# Patient Record
Sex: FEMALE | Race: WHITE | NOT HISPANIC OR LATINO | Employment: UNEMPLOYED | URBAN - METROPOLITAN AREA
[De-identification: names, ages, dates, MRNs, and addresses within clinical notes are randomized per-mention and may not be internally consistent; named-entity substitution may affect disease eponyms.]

---

## 2017-02-10 ENCOUNTER — TELEPHONE (OUTPATIENT)
Dept: UROGYNECOLOGY | Facility: CLINIC | Age: 37
End: 2017-02-10

## 2017-02-10 NOTE — TELEPHONE ENCOUNTER
Spoke to pt and scheduled her for Dr. Lyons's next available and informed her I'd send an appointment letter in the mail. Pt voiced understanding and call ended.

## 2017-02-10 NOTE — TELEPHONE ENCOUNTER
----- Message from Dmitri Barry sent at 2/10/2017  3:00 PM CST -----  Pt was refer by dr delgado. Pt needs appt with dr elkins. Pt can be reached at [phone].

## 2017-02-10 NOTE — TELEPHONE ENCOUNTER
----- Message from Victorina Wooten sent at 2/10/2017  3:24 PM CST -----  Contact: Patient  X _1st Request  _  2nd Request  _  3rd Request    Who:BRIAN RAYA [[id number]]    Why:Patient was returning a call back from the staff    What Number to Call Back:Patient can be reached at [phone]    When to Expect a call back: (Before the end of the day)   -- if call after 3:00 call back will be tomorrow.

## 2017-03-08 ENCOUNTER — INITIAL CONSULT (OUTPATIENT)
Dept: UROGYNECOLOGY | Facility: CLINIC | Age: 37
End: 2017-03-08
Payer: COMMERCIAL

## 2017-03-08 VITALS
SYSTOLIC BLOOD PRESSURE: 102 MMHG | DIASTOLIC BLOOD PRESSURE: 70 MMHG | HEIGHT: 67 IN | BODY MASS INDEX: 20.17 KG/M2 | WEIGHT: 128.5 LBS

## 2017-03-08 DIAGNOSIS — K58.9 IRRITABLE BOWEL SYNDROME, UNSPECIFIED TYPE: ICD-10-CM

## 2017-03-08 DIAGNOSIS — N30.10 INTERSTITIAL CYSTITIS: Primary | ICD-10-CM

## 2017-03-08 DIAGNOSIS — N80.9 ENDOMETRIOSIS: ICD-10-CM

## 2017-03-08 LAB
BILIRUB UR QL STRIP: NEGATIVE
CLARITY UR REFRACT.AUTO: CLEAR
COLOR UR AUTO: YELLOW
GLUCOSE UR QL STRIP: NEGATIVE
HGB UR QL STRIP: NEGATIVE
KETONES UR QL STRIP: NEGATIVE
LEUKOCYTE ESTERASE UR QL STRIP: NEGATIVE
NITRITE UR QL STRIP: NEGATIVE
PH UR STRIP: 7 [PH] (ref 5–8)
PROT UR QL STRIP: NEGATIVE
SP GR UR STRIP: 1 (ref 1–1.03)
URN SPEC COLLECT METH UR: NORMAL
UROBILINOGEN UR STRIP-ACNC: NEGATIVE EU/DL

## 2017-03-08 PROCEDURE — 81003 URINALYSIS AUTO W/O SCOPE: CPT

## 2017-03-08 PROCEDURE — 87086 URINE CULTURE/COLONY COUNT: CPT

## 2017-03-08 PROCEDURE — 99244 OFF/OP CNSLTJ NEW/EST MOD 40: CPT | Mod: S$GLB,,, | Performed by: OBSTETRICS & GYNECOLOGY

## 2017-03-08 PROCEDURE — 99999 PR PBB SHADOW E&M-EST. PATIENT-LVL III: CPT | Mod: PBBFAC,,, | Performed by: OBSTETRICS & GYNECOLOGY

## 2017-03-08 RX ORDER — NORETHINDRONE ACETATE AND ETHINYL ESTRADIOL, ETHINYL ESTRADIOL AND FERROUS FUMARATE 1MG-10(24)
KIT ORAL
COMMUNITY
Start: 2017-02-04 | End: 2021-03-11 | Stop reason: SDUPTHER

## 2017-03-08 RX ORDER — AMITRIPTYLINE HYDROCHLORIDE 10 MG/1
TABLET, FILM COATED ORAL
Qty: 100 TABLET | Refills: 1 | Status: SHIPPED | OUTPATIENT
Start: 2017-03-08 | End: 2017-04-28 | Stop reason: SDUPTHER

## 2017-03-08 RX ORDER — TOLTERODINE 4 MG/1
4 CAPSULE, EXTENDED RELEASE ORAL DAILY
Qty: 30 CAPSULE | Refills: 11 | Status: SHIPPED | OUTPATIENT
Start: 2017-03-08 | End: 2017-05-08 | Stop reason: SDUPTHER

## 2017-03-08 RX ORDER — URINARY ANTISEPTIC ANTISPASMODIC 81.6; 40.8; 10.8; .12 MG/1; MG/1; MG/1; MG/1
TABLET ORAL
Refills: 0 | COMMUNITY
Start: 2017-01-31 | End: 2017-03-08 | Stop reason: ALTCHOICE

## 2017-03-08 NOTE — LETTER
March 21, 2017      Dionte Gallegos MD  [address] [address]           [facility]  [address] [facility] [address]  Phone: [phone]          Patient: Ghada Manley   MR Number: [id number]   YOB: 1980   Date of Visit: 3/8/2017       Dear Dr. Dionte Gallegos:    Thank you for referring Ghada Manley to me for evaluation. Attached you will find relevant portions of my assessment and plan of care.    If you have questions, please do not hesitate to call me. I look forward to following Ghada Manley along with you.    Sincerely,    Hyun Lyons MD    Enclosure  CC:  No Recipients    If you would like to receive this communication electronically, please contact [email or web address] or [phone] to request more information on [facility] Link access.    For providers and/or their staff who would like to refer a patient to [facility], please contact us through our one-stop-shop provider referral line, [facility] , at [phone].    If you feel you have received this communication in error or would no longer like to receive these types of communications, please e-mail [email or web address]

## 2017-03-08 NOTE — PATIENT INSTRUCTIONS
1. IBS  --continue fiber  --continue avoiding dietary triggers  --Cognitive Behavioral Therapy on [address]     2. IC  --avoid dietary irritants  --manage stress/ anxiety  --detrol LA 4 mg-- stop vesicare.  Can take a few days to see an effect.  --start amitriptyline Take 10 mg nightly x 1 week.  Increase by 10 mg weekly to a max of 50 mg nightly.  --urinalysis and urine culture today  --consider cystohydrodistension vs. Bladder instillations in not improved  --consider prelief because cystex worked for her  --consider elmiron in the future if needed  --consider PT in the future    3. Endometriosis  --continue OCP's  --controlling will help bladder issues    4. RTC 6-8 weeks.

## 2017-03-08 NOTE — MR AVS SNAPSHOT
[facility]  [address] [facility] [address]  Phone: [phone]                  Ghada Manley   3/8/2017 10:30 AM   Initial consult    Description:  Female : 1980   Provider:  Hyun Lyons MD   Department:  [facility]           Reason for Visit     Bladder Pain     Urinary Incontinence     Urinary Frequency     Urinary Urgency           Diagnoses this Visit        Comments    Interstitial cystitis    -  Primary     Irritable bowel syndrome, unspecified type                To Do List           Future Appointments        Provider Department Dept Phone    2017 8:30 AM Hyun Lyons MD [facility] [phone]      Goals (5 Years of Data)     None       These Medications        Disp Refills Start End    tolterodine (DETROL LA) 4 MG 24 hr capsule 30 capsule 11 3/8/2017 3/8/2018    Take 1 capsule (4 mg total) by mouth once daily. - Oral    Pharmacy: [facility] - [address] Ph #: [phone]       amitriptyline (ELAVIL) 10 MG tablet 100 tablet 1 3/8/2017     Take 10 mg nightly x 1 week.  Increase by 10 mg weekly to a max of 50 mg nightly.    Pharmacy: [facility] - [address] Ph #: [phone]         [facility] On Call     [facility] Nurse Care Line -  Assistance  Registered nurses in the [facility] Center provide clinical advisement, health education, appointment booking, and other advisory services.  Call for this free service at [phone].             Medications           Message regarding Medications     Verify the changes and/or additions to your medication regime listed below are the same as discussed with your clinician today.  If any of these changes or additions are incorrect, please notify your healthcare provider.        START taking these NEW medications        Refills    tolterodine (DETROL LA) 4 MG 24 hr  "capsule 11    Sig: Take 1 capsule (4 mg total) by mouth once daily.    Class: Normal    Route: Oral    amitriptyline (ELAVIL) 10 MG tablet 1    Sig: Take 10 mg nightly x 1 week.  Increase by 10 mg weekly to a max of 50 mg nightly.    Class: Normal      STOP taking these medications     fluconazole (DIFLUCAN) 150 MG Tab Take one tablet today and repeat in three days if still symptomatic    methen-sod phos-meth blue-hyos 81.6-40.8-0.12 mg Tab     terconazole (TERAZOL 7) 0.4 % Crea Place 1 applicator vaginally once daily.           Verify that the below list of medications is an accurate representation of the medications you are currently taking.  If none reported, the list may be blank. If incorrect, please contact your healthcare provider. Carry this list with you in case of emergency.           Current Medications     LO LOESTRIN FE 1 mg-10 mcg (24)/10 mcg (2) Tab     metronidazole (METROGEL VAGINAL) 0.75 % vaginal gel Place 1 applicator vaginally once daily.    amitriptyline (ELAVIL) 10 MG tablet Take 10 mg nightly x 1 week.  Increase by 10 mg weekly to a max of 50 mg nightly.    nystatin-triamcinolone (MYCOLOG II) cream Apply to affected area 2 times daily    tolterodine (DETROL LA) 4 MG 24 hr capsule Take 1 capsule (4 mg total) by mouth once daily.           Clinical Reference Information           Your Vitals Were     BP Height Weight BMI       102/70 (BP Location: Right arm, Patient Position: Sitting) 5' 7" (1.702 m) 58.3 kg (128 lb 8.5 oz) 20.13 kg/m2       Blood Pressure          Most Recent Value    BP  102/70      Allergies as of 3/8/2017     Ciprofloxacin (Bulk)    Codeine      Immunizations Administered on Date of Encounter - 3/8/2017     None      Orders Placed During Today's Visit      Normal Orders This Visit    Urinalysis     Urine culture       Instructions    1. IBS  --continue fiber  --continue avoiding dietary triggers  --Cognitive Behavioral Therapy on [address]     2. IC  --avoid dietary " irritants  --manage stress/ anxiety  --detrol LA 4 mg-- stop vesicare.  Can take a few days to see an effect.  --start amitriptyline Take 10 mg nightly x 1 week.  Increase by 10 mg weekly to a max of 50 mg nightly.  --urinalysis and urine culture today  --consider cystohydrodistension vs. Bladder instillations in not improved  --consider prelief because cystex worked for her  --consider elmiron in the future if needed  --consider PT in the future    3. Endometriosis  --continue OCP's  --controlling will help bladder issues    4. RTC 6-8 weeks.         Language Assistance Services     ATTENTION: Language assistance services are available, free of charge. Please call [phone].      ATENCIÓN: Si jorge luisla en, tiene a christianson disposición servicios gratuitos de asistencia lingüística. Llame al [phone].     SUPRIYA Ý: N?u b?n nói Ti?ng Vi?t, có các d?ch v? h? tr? ngôn ng? mi?n phí dành cho b?n. G?i s? [phone].         [facility] complies with applicable Federal civil rights laws and does not discriminate on the basis of race, color, national origin, age, disability, or sex.

## 2017-03-08 NOTE — PROGRESS NOTES
[facility]  [address] [facility] [address]    Ghada Manley  [id number]  1980 March 21, 2017    Consulting Physician: Dionte Gallegos MD   GYN: MD [facility]  Primary M.D.: Dionte Gallegos MD    Chief Complaint   Patient presents with    Bladder Pain     pt states she feel a intense feeling when she has to go and burning sensation    Urinary Incontinence     pt states she leak urine when she laugh, cough, or sneeze    Urinary Frequency     pt states she always has to go to the bathroom    Urinary Urgency     pt states she always has the urge to go       HPI:   1)  BM: +IBS-D.  No constipation.  +food triggers (fructose intolerance).  Stress can flare.  Flares make bladder sx worse.  Pretty well controlled on meds. No hematochezia.  Takes fiber daily.  2)  IC:  --when/how Dx: 2007 Dx IBS.  Then had some irritable bladder with 1st child.  Had sx in 2012--saw [facility], started on ? Med, was given sample x 3 days--which helped.  Off market, but AZO can help x a few days by not as much.  2014:  Intermittent yeast infection/UTI.  Saw Rosy and was referred here.  Started probiotic on own, and this has stopped all issues.  Only has some clear, vaginal d/c, which is not bothersome.  2015 was seen by [facility] in [place], was Dx with IC--advised to avoid dietary irritants.  Was also told to control endometriosis--was placed on lo lo estrin to control heavy, painful menses (bladder issues worse with this).  Takes OCP continuously, which controls menstrual symptoms.  Had flare around Xmas 2015 (stress/anxiety)--took urogesic blue, but made her feel worse.  Then, stopped.  Saw Dr. Levy ([facility])--wanted to do cysto/hydro, instillations, CT due to micro hematuria.  Was given VESIcare (helped urgency, osiris if standing).  Stopped x 1 day, sx really worse, so restarted.  VESIcare makes chest itch?    --typical symptoms with flares: U/F, no dysuria/hesitancy.  Slight relief with emptying  but short lived.  Constant low grade vaginal/bladder pain.  Dull, constant.  Will have intermittent cramping.  Current frequency Qh.  Without flare: Q 2-3 hours.  Nocturia 0-1x/PM.  Some UUI and slight MIGNON with bending forward.  Does not typically have UI.    --flare freq: last flare Xmas .  Still having.  Usually Q6 months.    --current regimen: Taking 5 mg daily.  Is it working? Improved but not idea--mostly with U/F.    --past attempted meds/Tx: urogesic (caused pain).  AZO dulls mildly.  Cystex helps pain most.  Took amitriptyline for years for IBS--stopped b/c wanted to get off meds.    --anxiety/stress: +.  Bladder/bowel worse with this.  Did therapy in past (talk and visualization)--last was few years ago. Controlled: variable, mostly around holidays.  Good support system.   --seasonal allergies: none  --general pelvic pain/dyspareunia: occasional has cramping that feels like mild menstrual cramps, ?Ghost period symptoms.  Has been avoiding sex: act not painful but irritated afterwards.    --no s/sx POP    Past Medical History  Past Medical History:   Diagnosis Date    Menarche     Age of onset 11   IBS    Past Surgical History  Past Surgical History:   Procedure Laterality Date     SECTION      SALPINGECTOMY     Dx LSC: endometriosis, adhesiolysis, ?fulguration  c/s x 2  2009: Ectopic pregnancy + unilateral saplingectomy    Hysterectomy: No    Past Ob History     x 0.  C/s x 2 (1st breech).    Largest infant weight: 7#10oz.      Gynecologic History  LMP: No LMP recorded. Patient is not currently having periods (Reason: Birth Control).  Age of menarche: 11 y.o.  Age of menopause: NA  Menstrual history: h/o endo, dysmenorrhea, menorrhagia.  On continuous OCP.   Last pap: 2017, normal per report.  No h/o abnl paps/STDs.   Last mammogram: Baseline 34 y.o (+FH).  Repeat at 39 y.o.   Colonoscopy: , normal per report (hemorrhoids). For IBS.   DEXA: NA    Family History  Family History  "  Problem Relation Age of Onset    Diabetes Maternal Grandmother     Breast cancer Maternal Grandmother     Diabetes Maternal Grandfather     Hypertension Maternal Grandfather     Diabetes Father     Hypertension Father     Breast cancer Mother 61    Breast cancer Maternal Aunt 28    Colon cancer Neg Hx     Ovarian cancer Neg Hx     Vaginal cancer Neg Hx     Endometrial cancer Neg Hx     Cervical cancer Neg Hx       Colon CA: No  Breast CA: Yes - mother (60s), MA (25 y.o.), MGM (BRCA neg but other gene +).    GYN CA: No   CA: No    Social History  History   Smoking Status    Never Smoker   Smokeless Tobacco    Never Used     History   Alcohol Use No   .    History   Drug Use No   .  The patient is .  Resides in [place] [address].  Employment status: homemaker.     Allergies  Review of patient's allergies indicates:   Allergen Reactions    Ciprofloxacin (bulk) Anaphylaxis    Codeine Anaphylaxis       Medications  Current Outpatient Prescriptions on File Prior to Visit   Medication Sig Dispense Refill    metronidazole (METROGEL VAGINAL) 0.75 % vaginal gel Place 1 applicator vaginally once daily. 1 applicator 0    nystatin-triamcinolone (MYCOLOG II) cream Apply to affected area 2 times daily 30 g 1     No current facility-administered medications on file prior to visit.        Review of Systems A 14 point ROS was reviewed with pertinent positives as noted above in the history of present illness.      Constitutional: negative  Eyes: negative  Endocrine: negative  Gastrointestinal: negative  Cardiovascular: negative  Respiratory: negative  Allergic/Immunologic: negative  Integumentary: negative  Psychiatric: negative  Musculoskeletal: negative   Ear/Nose/Throat: negative  Neurologic: negative  Genitourinary: SEE HPI  Hematologic/Lymphatic: negative   Breast: negative    Urogynecologic Exam  /70 (BP Location: Right arm, Patient Position: Sitting)  Ht 5' 7" (1.702 m)  Wt 58.3 kg (128 " lb 8.5 oz)  BMI 20.13 kg/m2    GENERAL APPEARANCE:  The patient is a well-developed, well-nourished  female.  Neck:  Supple with no thyromegaly, no carotid bruits.  Heart:  Regular rate and rhythm, no murmurs, rubs or gallops.  Lungs:  Clear.  No CVA tenderness.  Abdomen:  Soft, nontender, nondistended, no hepatosplenomegaly.  Incisions:  Pfannenstiel, Well healed    PELVIC:    External genitalia:  Normal Bartholins, Skenes and labia bilaterally.  + suprapubic pressure with palpation  Urethra:  No caruncle, diverticulum or masses.  (--) hypermobility.    Vagina:  Atrophy (--) , no bladder masses or tender, no discharge.    Cervix:  normal appearance  Uterus: normal size, contour, position, consistency, mobility, non-tender  Adnexa: Not palpable.    POP-Q:  Deferred.  No obvious POP present with valsalva.     NEUROLOGIC:  Cranial nerves 2 through 12 intact.  Strength 5/5.  DTRs 2+ lower extremities.  S2 through 4 normal.  Sacral reflexes intact.    EXT: GUZMAN, 2+ pulses bilaterally, no C/C/E    COUGH STRESS TEST:  negative  KEGEL: 1 /5    RECTAL:    External:  Normal, (--) hemorrhoids, (--) dovetailing.   Internal:  Deferred    Impression    1. Interstitial cystitis    2. Irritable bowel syndrome, unspecified type    3. Endometriosis        Initial Plan  The patient was counseled regarding these issues. She was given a summary sheet containing each of these issues with possible options for evaluation and management. We also reviewed computer-generated diagrams specific to her pelvic organ prolapse quantification findings and she was given a copy of this for her records.  All her questions were addressed to her satisfaction.     1. IBS  --continue fiber  --continue avoiding dietary triggers  --Cognitive Behavioral Therapy on [address]     2. IC  --avoid dietary irritants  --manage stress/ anxiety  --detrol LA 4 mg-- stop vesicare.  Can take a few days to see an effect.  --start amitriptyline Take 10 mg nightly x 1  week.  Increase by 10 mg weekly to a max of 50 mg nightly.  --urinalysis and urine culture today  --consider cystohydrodistension vs. Bladder instillations in not improved  --consider prelief because cystex worked for her  --consider elmiron in the future if needed  --consider PT in the future    3. Endometriosis  --continue OCP's  --controlling will help bladder issues    4. RTC 6-8 weeks.    Approximately 45 min were spent in consult, 90 % in discussion.     Thank you for requesting consultation of your patient.  I look forward to participating in her care.    Hyun Lyons  Female Pelvic Medicine and Reconstructive Surgery  [facility] [place], [place]

## 2017-03-10 LAB — BACTERIA UR CULT: NO GROWTH

## 2017-03-21 PROBLEM — N30.10 INTERSTITIAL CYSTITIS: Status: ACTIVE | Noted: 2017-03-21

## 2017-03-21 PROBLEM — N80.9 ENDOMETRIOSIS: Status: ACTIVE | Noted: 2017-03-21

## 2017-03-21 PROBLEM — K58.9 IRRITABLE BOWEL SYNDROME: Status: ACTIVE | Noted: 2017-03-21

## 2017-04-24 ENCOUNTER — TELEPHONE (OUTPATIENT)
Dept: UROGYNECOLOGY | Facility: CLINIC | Age: 37
End: 2017-04-24

## 2017-04-28 ENCOUNTER — OFFICE VISIT (OUTPATIENT)
Dept: UROGYNECOLOGY | Facility: CLINIC | Age: 37
End: 2017-04-28
Payer: COMMERCIAL

## 2017-04-28 VITALS
DIASTOLIC BLOOD PRESSURE: 74 MMHG | SYSTOLIC BLOOD PRESSURE: 110 MMHG | HEIGHT: 67 IN | BODY MASS INDEX: 20.41 KG/M2 | WEIGHT: 130.06 LBS

## 2017-04-28 DIAGNOSIS — N30.10 INTERSTITIAL CYSTITIS: ICD-10-CM

## 2017-04-28 DIAGNOSIS — K58.9 IRRITABLE BOWEL SYNDROME, UNSPECIFIED TYPE: Primary | ICD-10-CM

## 2017-04-28 DIAGNOSIS — N80.9 ENDOMETRIOSIS: ICD-10-CM

## 2017-04-28 PROCEDURE — 99213 OFFICE O/P EST LOW 20 MIN: CPT | Mod: S$GLB,,, | Performed by: OBSTETRICS & GYNECOLOGY

## 2017-04-28 PROCEDURE — 1160F RVW MEDS BY RX/DR IN RCRD: CPT | Mod: S$GLB,,, | Performed by: OBSTETRICS & GYNECOLOGY

## 2017-04-28 PROCEDURE — 99999 PR PBB SHADOW E&M-EST. PATIENT-LVL III: CPT | Mod: PBBFAC,,, | Performed by: OBSTETRICS & GYNECOLOGY

## 2017-04-28 RX ORDER — AMITRIPTYLINE HYDROCHLORIDE 10 MG/1
TABLET, FILM COATED ORAL
Qty: 60 TABLET | Refills: 3 | Status: SHIPPED | OUTPATIENT
Start: 2017-04-28 | End: 2017-05-08 | Stop reason: SDUPTHER

## 2017-04-28 NOTE — MR AVS SNAPSHOT
[facility]  [address] [facility] [address]  Phone: [phone]                  Ghada Manley   2017 9:00 AM   Office Visit    Description:  Female : 1980   Provider:  Hyun Lyons MD   Department:  [facility]           Reason for Visit     IC     Irritable Bowel Syndrome           Diagnoses this Visit        Comments    Interstitial cystitis                To Do List           Goals (5 Years of Data)     None       These Medications        Disp Refills Start End    amitriptyline (ELAVIL) 10 MG tablet 60 tablet 3 2017     Take 10 mg nightly x 1 week.  Increase by 10 mg weekly to a max of 50 mg nightly.    Pharmacy: [address] #: [phone]         [facility] On Call     [facility] On [facility] Nurse Care Line -  Assistance  Unless otherwise directed by your provider, please contact [facility], our nurse care line that is available for  assistance.     Registered nurses in the [facility] provide: appointment scheduling, clinical advisement, health education, and other advisory services.  Call: [phone] (toll free)               Medications           Message regarding Medications     Verify the changes and/or additions to your medication regime listed below are the same as discussed with your clinician today.  If any of these changes or additions are incorrect, please notify your healthcare provider.        STOP taking these medications     metronidazole (METROGEL VAGINAL) 0.75 % vaginal gel Place 1 applicator vaginally once daily.           Verify that the below list of medications is an accurate representation of the medications you are currently taking.  If none reported, the list may be blank. If incorrect, please contact your healthcare provider. Carry this list with you in case of emergency.           Current Medications     amitriptyline (ELAVIL) 10  "MG tablet Take 10 mg nightly x 1 week.  Increase by 10 mg weekly to a max of 50 mg nightly.    LO LOESTRIN FE 1 mg-10 mcg (24)/10 mcg (2) Tab     tolterodine (DETROL LA) 4 MG 24 hr capsule Take 1 capsule (4 mg total) by mouth once daily.    nystatin-triamcinolone (MYCOLOG II) cream Apply to affected area 2 times daily           Clinical Reference Information           Your Vitals Were     BP Height Weight BMI       110/74 (BP Location: Left arm, Patient Position: Sitting) 5' 7" (1.702 m) 59 kg (130 lb 1.1 oz) 20.37 kg/m2       Blood Pressure          Most Recent Value    BP  110/74      Allergies as of 4/28/2017     Ciprofloxacin (Bulk)    Codeine      Immunizations Administered on Date of Encounter - 4/28/2017     None      Instructions    1. IBS  --continue fiber  --continue avoiding dietary triggers  --continue meditation/relaxation exercises    2. IC  --avoid dietary irritants  --manage stress/ anxiety  --continue detrol LA 4 mg  --continue amitriptyline 10-20 mg daily; may increase shortterm for flares; call us if would like to send to mail order  --consider cystohydrodistension vs. Bladder instillations in not improved  --consider prelief because cystex worked for her  --consider elmiron in the future if needed  --consider PT in the future    3. Endometriosis  --continue OCP's  --controlling will help bladder issues    4. RTC 6 months.          Language Assistance Services     ATTENTION: Language assistance services are available, free of charge. Please call [phone].      ATENCIÓN: Si guy en, tiene a christianson disposición servicios gratuitos de asistencia lingüística. Llame al [phone].     [facility] Ý: N?u b?n nói Ti?ng Vi?t, có các d?ch v? h? tr? ngôn ng? mi?n phí dành cho b?n. G?i s? [phone].         [facility] complies with applicable Federal civil rights laws and does not discriminate on the basis of race, color, national origin, age, disability, or sex.        "

## 2017-04-28 NOTE — PATIENT INSTRUCTIONS
1. IBS  --continue fiber  --continue avoiding dietary triggers  --continue meditation/relaxation exercises    2. IC  --avoid dietary irritants  --manage stress/ anxiety  --continue detrol LA 4 mg  --continue amitriptyline 10-20 mg daily; may increase shortterm for flares; call us if would like to send to mail order  --consider cystohydrodistension vs. Bladder instillations in not improved  --consider prelief because cystex worked for her  --consider elmiron in the future if needed  --consider PT in the future    3. Endometriosis  --continue OCP's  --controlling will help bladder issues    4. RTC 6 months.

## 2017-04-28 NOTE — PROGRESS NOTES
Urogyn follow up    [facility]  [address] [address]    Ghada Manley  [id number]  1980      Ghada Manley is a 36 y.o. here for a urogyn follow up.    1)  BM: +IBS-D.  No constipation.  +food triggers (fructose intolerance).  Stress can flare.  Flares make bladder sx worse.  Pretty well controlled on meds. No hematochezia.  Takes fiber daily.  2)  IC:  --when/how Dx: 2007 Dx IBS.  Then had some irritable bladder with 1st child.  Had sx in 2012--saw URO JOSE, started on ? Med, was given sample x 3 days--which helped.  Off market, but AZO can help x a few days by not as much.  2014:  Intermittent yeast infection/UTI.  Saw [facility] and was referred here.  Started probiotic on own, and this has stopped all issues.  Only has some clear, vaginal d/c, which is not bothersome.  2015 was seen by [facility] in [place], was Dx with IC--advised to avoid dietary irritants.  Was also told to control endometriosis--was placed on lo lo estrin to control heavy, painful menses (bladder issues worse with this).  Takes OCP continuously, which controls menstrual symptoms.  Had flare around Xmas 2015 (stress/anxiety)--took urogesic blue, but made her feel worse.  Then, stopped.  Saw Dr. Levy (URO)--wanted to do cysto/hydro, instillations, CT due to micro hematuria.  Was given VESIcare (helped urgency, osiris if standing).  Stopped x 1 day, sx really worse, so restarted.  VESIcare makes chest itch?    --typical symptoms with flares: U/F, no dysuria/hesitancy.  Slight relief with emptying but short lived.  Constant low grade vaginal/bladder pain.  Dull, constant.  Will have intermittent cramping.  Current frequency Qh.  Without flare: Q 2-3 hours.  Nocturia 0-1x/PM.  Some UUI and slight MIGNON with bending forward.  Does not typically have UI.    --flare freq: last flare Xmas 2016.  Still having.  Usually Q6 months.    --current regimen: Taking 5 mg daily.  Is it working? Improved but not idea--mostly  with U/F.    --past attempted meds/Tx: urogesic (caused pain).  AZO dulls mildly.  Cystex helps pain most.  Took amitriptyline for years for IBS--stopped b/c wanted to get off meds.    --anxiety/stress: +.  Bladder/bowel worse with this.  Did therapy in past (talk and visualization)--last was few years ago. Controlled: variable, mostly around holidays.  Good support system.   --seasonal allergies: none  --general pelvic pain/dyspareunia: occasional has cramping that feels like mild menstrual cramps, ?Ghost period symptoms.  Has been avoiding sex: act not painful but irritated afterwards.    --no s/sx POP    Initial exam:  PELVIC:    External genitalia:  Normal Bartholins, Skenes and labia bilaterally.  + suprapubic pressure with palpation  Urethra:  No caruncle, diverticulum or masses.  (--) hypermobility.    Vagina:  Atrophy (--) , no bladder masses or tender, no discharge.    Cervix:  normal appearance  Uterus: normal size, contour, position, consistency, mobility, non-tender  Adnexa: Not palpable.  POP-Q:  Deferred.  No obvious POP present with valsalva.     Past Medical History  Past Medical History:   Diagnosis Date    Menarche     Age of onset 11   IBS    Past Surgical History  Past Surgical History:   Procedure Laterality Date     SECTION      SALPINGECTOMY     Dx LSC: endometriosis, adhesiolysis, ?fulguration  c/s x 2  2009: Ectopic pregnancy + unilateral saplingectomy    Hysterectomy: No    Past Ob History     x 0.  C/s x 2 (1st breech).    Largest infant weight: 7#10oz.      Well-woman:  Last pap: 2017, normal per report.  No h/o abnl paps/STDs.   Last mammogram: Baseline 34 y.o (+FH).  Repeat at 39 y.o.   Colonoscopy: , normal per report (hemorrhoids). For IBS.   DEXA: NA    Issues include:  Patient Active Problem List   Diagnosis    Family history of breast cancer    Interstitial cystitis    Irritable bowel syndrome    Endometriosis     History since last visit:     1.  "IBS  --continue fiber  --continue avoiding dietary triggers  --didn't start CBT; has started guided meditation tapes     2. IC  --avoid dietary irritants  --after Alice had short flare; increased amitriptyline 30 mg x 3 days, and was ok  --Started Detrol LA daily (no issues).    --started amitriptyline: taking 20 mg nightly--helping but makes a little sleepy  --has been restarting exercise  --urine NEG   --consider cystohydrodistension vs. Bladder instillations in not improved  --consider prelief because cystex worked for her  --consider elmiron in the future if needed  --consider PT in the future    3. Endometriosis  --continue OCP's  --controlling will help bladder issues    Medications:    Current Outpatient Prescriptions:     amitriptyline (ELAVIL) 10 MG tablet, Take 10 mg nightly x 1 week.  Increase by 10 mg weekly to a max of 50 mg nightly., Disp: 60 tablet, Rfl: 3    LO LOESTRIN FE 1 mg-10 mcg (24)/10 mcg (2) Tab, , Disp: , Rfl:     tolterodine (DETROL LA) 4 MG 24 hr capsule, Take 1 capsule (4 mg total) by mouth once daily., Disp: 30 capsule, Rfl: 11    nystatin-triamcinolone (MYCOLOG II) cream, Apply to affected area 2 times daily, Disp: 30 g, Rfl: 1      ROS:  As per HPI.      Exam  /74 (BP Location: Left arm, Patient Position: Sitting)  Ht 5' 7" (1.702 m)  Wt 59 kg (130 lb 1.1 oz)  BMI 20.37 kg/m2  General: alert and oriented, no acute distress  Remainder of PE deferred.     Impression  1. Irritable bowel syndrome, unspecified type     2. Interstitial cystitis  amitriptyline (ELAVIL) 10 MG tablet   3. Endometriosis       We reviewed the above issues and discussed options for short-term versus long-term management of her problems.   Plan:   1. IBS  --continue fiber  --continue avoiding dietary triggers  --continue meditation/relaxation exercises    2. IC  --avoid dietary irritants  --manage stress/ anxiety  --continue detrol LA 4 mg  --continue amitriptyline 10-20 mg daily; may increase " shortterm for flares; call us if would like to send to mail order  --consider cystohydrodistension vs. Bladder instillations in not improved  --consider prelief because cystex worked for her  --consider elmiron in the future if needed  --consider PT in the future    3. Endometriosis  --continue OCP's  --controlling will help bladder issues    4. RTC 6 months.     30 minutes were spent in face to face time with this patient  100 % of this time was spent in counseling and/or coordination of care  ME@  [facility]  Division of Female Pelvic Medicine and Reconstructive Surgery  Department of Obstetrics & Gynecology

## 2017-05-08 DIAGNOSIS — N30.10 INTERSTITIAL CYSTITIS: ICD-10-CM

## 2017-05-08 RX ORDER — AMITRIPTYLINE HYDROCHLORIDE 10 MG/1
TABLET, FILM COATED ORAL
Qty: 60 TABLET | Refills: 3 | Status: SHIPPED | OUTPATIENT
Start: 2017-05-08 | End: 2017-09-18 | Stop reason: SDUPTHER

## 2017-05-08 RX ORDER — TOLTERODINE 4 MG/1
4 CAPSULE, EXTENDED RELEASE ORAL DAILY
Qty: 30 CAPSULE | Refills: 11 | Status: SHIPPED | OUTPATIENT
Start: 2017-05-08 | End: 2018-05-05 | Stop reason: SDUPTHER

## 2017-05-08 NOTE — TELEPHONE ENCOUNTER
----- Message from Surekha Limon sent at 5/8/2017  9:50 AM CDT -----  Contact: BRIAN RAYA [[id number]]  x_  1st Request  _  2nd Request  _  3rd Request        Who: BRIAN RAYA [[id number]]    Why: Patient states she would like a call back in regards to medications. She states [facility] has faxed over request for mail in requests on Rx DETROL LA and ELAVIL. She states if there is no request than the office can call [phone]. Thanks     What Number to Call Back: [phone] patient     When to Expect a call back: (Before the end of the day)   -- if call after 3:00 call back will be tomorrow.

## 2017-05-08 NOTE — TELEPHONE ENCOUNTER
Spoke with pt and advised her we did receive the request today for her RX's and It'll be sent in escribed to [facility]. She verbalized understanding call ended.    Pt Pharmacy sent a Fax requesting a refill of Detrol & Elavil . Pt last seen on 04/28/2017 .

## 2017-09-18 DIAGNOSIS — N30.10 INTERSTITIAL CYSTITIS: ICD-10-CM

## 2017-09-18 RX ORDER — AMITRIPTYLINE HYDROCHLORIDE 10 MG/1
TABLET, FILM COATED ORAL
Qty: 60 TABLET | Refills: 3 | Status: SHIPPED | OUTPATIENT
Start: 2017-09-18 | End: 2018-11-14

## 2017-09-18 RX ORDER — AMITRIPTYLINE HYDROCHLORIDE 10 MG/1
20 TABLET, FILM COATED ORAL NIGHTLY
Qty: 60 TABLET | Refills: 10 | Status: SHIPPED | OUTPATIENT
Start: 2017-09-18 | End: 2018-10-22 | Stop reason: SDUPTHER

## 2017-09-18 NOTE — TELEPHONE ENCOUNTER
----- Message from Dorota Cook sent at 9/18/2017 10:50 AM CDT -----  Contact: self  Patient is requesting a follow up appointment, patient can be reached at [phone]

## 2017-09-18 NOTE — TELEPHONE ENCOUNTER
Called pt to schedule her 6 month fup. Pt also request a refill on her amitriptyline thru her mail order [facility] being she will be out before her scheduled appointment.

## 2017-10-04 ENCOUNTER — OFFICE VISIT (OUTPATIENT)
Dept: UROGYNECOLOGY | Facility: CLINIC | Age: 37
End: 2017-10-04
Payer: COMMERCIAL

## 2017-10-04 VITALS
HEIGHT: 67 IN | SYSTOLIC BLOOD PRESSURE: 102 MMHG | DIASTOLIC BLOOD PRESSURE: 64 MMHG | WEIGHT: 132.5 LBS | BODY MASS INDEX: 20.8 KG/M2

## 2017-10-04 DIAGNOSIS — K58.9 IRRITABLE BOWEL SYNDROME, UNSPECIFIED TYPE: ICD-10-CM

## 2017-10-04 DIAGNOSIS — N30.10 INTERSTITIAL CYSTITIS: Primary | ICD-10-CM

## 2017-10-04 DIAGNOSIS — Z80.3 FAMILY HISTORY OF BREAST CANCER: ICD-10-CM

## 2017-10-04 DIAGNOSIS — N80.9 ENDOMETRIOSIS: ICD-10-CM

## 2017-10-04 PROCEDURE — 99213 OFFICE O/P EST LOW 20 MIN: CPT | Mod: S$GLB,,, | Performed by: OBSTETRICS & GYNECOLOGY

## 2017-10-04 PROCEDURE — 99999 PR PBB SHADOW E&M-EST. PATIENT-LVL III: CPT | Mod: PBBFAC,,, | Performed by: OBSTETRICS & GYNECOLOGY

## 2017-10-04 NOTE — PROGRESS NOTES
Urogyn follow up    [facility]  [address] [place] [address] [address]    Ghada Manley  [id number]  1980    GYN: [facility]    Ghada Manley is a 37 y.o. here for a urogyn follow up.    1)  BM: +IBS-D.  No constipation.  +food triggers (fructose intolerance).  Stress can flare.  Flares make bladder sx worse.  Pretty well controlled on meds. No hematochezia.  Takes fiber daily.  2)  IC:  --when/how Dx: 2007 Dx IBS.  Then had some irritable bladder with 1st child.  Had sx in 2012--saw [facility]RO EJ, started on ? Med, was given sample x 3 days--which helped.  Off market, but AZO can help x a few days by not as much.  2014:  Intermittent yeast infection/UTI.  Saw [facility] and was referred here.  Started probiotic on own, and this has stopped all issues.  Only has some clear, vaginal d/c, which is not bothersome.  2015 was seen by [facility] in [place], was Dx with IC--advised to avoid dietary irritants.  Was also told to control endometriosis--was placed on lo lo estrin to control heavy, painful menses (bladder issues worse with this).  Takes OCP continuously, which controls menstrual symptoms.  Had flare around Xmas 2015 (stress/anxiety)--took urogesic blue, but made her feel worse.  Then, stopped.  Saw Dr. Levy (URO)--wanted to do cysto/hydro, instillations, CT due to micro hematuria.  Was given VESIcare (helped urgency, osiris if standing).  Stopped x 1 day, sx really worse, so restarted.  VESIcare makes chest itch?    --typical symptoms with flares: U/F, no dysuria/hesitancy.  Slight relief with emptying but short lived.  Constant low grade vaginal/bladder pain.  Dull, constant.  Will have intermittent cramping.  Current frequency Qh.  Without flare: Q 2-3 hours.  Nocturia 0-1x/PM.  Some UUI and slight MIGNON with bending forward.  Does not typically have UI.    --flare freq: last flare Xmas 2016.  Still having.  Usually Q6 months.    --current regimen: Taking 5 mg daily.  Is it working? Improved but not  idea--mostly with U/F.    --past attempted meds/Tx: urogesic (caused pain).  AZO dulls mildly.  Cystex helps pain most.  Took amitriptyline for years for IBS--stopped b/c wanted to get off meds.    --anxiety/stress: +.  Bladder/bowel worse with this.  Did therapy in past (talk and visualization)--last was few years ago. Controlled: variable, mostly around holidays.  Good support system.   --seasonal allergies: none  --general pelvic pain/dyspareunia: occasional has cramping that feels like mild menstrual cramps, ?Ghost period symptoms.  Has been avoiding sex: act not painful but irritated afterwards.    --no s/sx POP    Initial exam:  PELVIC:    External genitalia:  Normal Bartholins, Skenes and labia bilaterally.  + suprapubic pressure with palpation  Urethra:  No caruncle, diverticulum or masses.  (--) hypermobility.    Vagina:  Atrophy (--) , no bladder masses or tender, no discharge.    Cervix:  normal appearance  Uterus: normal size, contour, position, consistency, mobility, non-tender  Adnexa: Not palpable.  POP-Q:  Deferred.  No obvious POP present with valsalva.     Past Medical History  Past Medical History:   Diagnosis Date    Menarche     Age of onset 11   IBS    Past Surgical History  Past Surgical History:   Procedure Laterality Date     SECTION      SALPINGECTOMY     Dx LSC: endometriosis, adhesiolysis, ?fulguration  c/s x 2  2009: Ectopic pregnancy + unilateral saplingectomy    Hysterectomy: No    Past Ob History     x 0.  C/s x 2 (1st breech).    Largest infant weight: 7#10oz.      Well-woman:  Last pap: 2017, normal per report.  No h/o abnl paps/STDs.   Last mammogram: Baseline 34 y.o (+FH).  Repeat at 39 y.o.   Colonoscopy: , normal per report (hemorrhoids). For IBS.   DEXA: NA    Issues include:  Patient Active Problem List   Diagnosis    Family history of breast cancer    Interstitial cystitis    Irritable bowel syndrome    Endometriosis     History since last visit:  "    1. IBS  --continue fiber  --continue avoiding dietary triggers  --didn't start CBT; has started guided meditation tapes     2. IC  --avoiding dietary irritants  --after Alice had short flare; increased amitriptyline 30 mg x 3 days, and was ok  --Started Detrol LA daily (no issues).    --started amitriptyline: taking 20 mg nightly--helping but makes a little sleepy; 10 not effective; goes up to 30 mg PRN for mild flares  --exercising  --urine NEG   --consider cystohydrodistension vs. Bladder instillations in not improved  --consider prelief because cystex worked for her  --consider elmiron in the future if needed  --consider PT in the future    3. Endometriosis  --continue OCP's  --controlling will help bladder issues    4.  MIGNON:  --still has small amounts; wears pantyliner daily--very slight    Medications:    Current Outpatient Prescriptions:     amitriptyline (ELAVIL) 10 MG tablet, Take 10 mg nightly x 1 week.  Increase by 10 mg weekly to a max of 50 mg nightly., Disp: 60 tablet, Rfl: 3    amitriptyline (ELAVIL) 10 MG tablet, Take 2 tablets (20 mg total) by mouth every evening., Disp: 60 tablet, Rfl: 10    LO LOESTRIN FE 1 mg-10 mcg (24)/10 mcg (2) Tab, , Disp: , Rfl:     tolterodine (DETROL LA) 4 MG 24 hr capsule, Take 1 capsule (4 mg total) by mouth once daily., Disp: 30 capsule, Rfl: 11      ROS:  As per HPI.      Exam  /64   Ht 5' 7" (1.702 m)   Wt 60.1 kg (132 lb 7.9 oz)   BMI 20.75 kg/m²   General: alert and oriented, no acute distress  Remainder of PE deferred.     Impression  1. Interstitial cystitis     2. Endometriosis     3. Irritable bowel syndrome, unspecified type     4. Family history of breast cancer       We reviewed the above issues and discussed options for short-term versus long-term management of her problems.   Plan:   1. IBS  --continue fiber  --continue avoiding dietary triggers  --continue meditation/relaxation exercises    2. IC  --avoid dietary irritants  --manage " stress/ anxiety  --continue detrol LA 4 mg  --continue amitriptyline 10-20 mg daily; may increase shortterm for flares; call us if would like to send to mail order  --consider cystohydrodistension vs. Bladder instillations in not improved  --consider prelief because cystex worked for her  --consider elmiron in the future if needed  --consider PT in the future    3. Endometriosis  --continue OCP's  --controlling will help bladder issues    4. Stress incontinence:  --Empty bladder every 3 hours.  Empty well: wait a minute, lean forward on toilet.    --KEGELS: do 10 in AM and 10 in PM, holding each x 10 seconds.  When you feel urge to go, STOP, KEGEL, and when urge has passed, then go to bathroom.  Consider PT in future.    --do not go overboard with these--watch making pelvic tension worse  --let us know if not helping--can consider PT    5.  RTC 1 year.      20 minutes were spent in face to face time with this patient  100 % of this time was spent in counseling and/or coordination of care  ME@  [facility]  Division of Female Pelvic Medicine and Reconstructive Surgery  Department of Obstetrics & Gynecology

## 2017-10-04 NOTE — PATIENT INSTRUCTIONS
1. IBS  --continue fiber  --continue avoiding dietary triggers  --continue meditation/relaxation exercises    2. IC  --avoid dietary irritants  --manage stress/ anxiety  --continue detrol LA 4 mg  --continue amitriptyline 10-20 mg daily; may increase shortterm for flares; call us if would like to send to mail order  --consider cystohydrodistension vs. Bladder instillations in not improved  --consider prelief because cystex worked for her  --consider elmiron in the future if needed  --consider PT in the future    3. Endometriosis  --continue OCP's  --controlling will help bladder issues    4. Stress incontinence:  --Empty bladder every 3 hours.  Empty well: wait a minute, lean forward on toilet.    --KEGELS: do 10 in AM and 10 in PM, holding each x 10 seconds.  When you feel urge to go, STOP, KEGEL, and when urge has passed, then go to bathroom.  Consider PT in future.    --do not go overboard with these--watch making pelvic tension worse  --let us know if not helping--can consider PT    5.  RTC 1 year.

## 2018-01-29 ENCOUNTER — OFFICE VISIT (OUTPATIENT)
Dept: URGENT CARE | Facility: CLINIC | Age: 38
End: 2018-01-29
Payer: COMMERCIAL

## 2018-01-29 VITALS
SYSTOLIC BLOOD PRESSURE: 121 MMHG | RESPIRATION RATE: 16 BRPM | OXYGEN SATURATION: 100 % | BODY MASS INDEX: 20.72 KG/M2 | HEIGHT: 67 IN | WEIGHT: 132 LBS | TEMPERATURE: 99 F | HEART RATE: 92 BPM | DIASTOLIC BLOOD PRESSURE: 73 MMHG

## 2018-01-29 DIAGNOSIS — Z20.828 EXPOSURE TO INFLUENZA: Primary | ICD-10-CM

## 2018-01-29 LAB
CTP QC/QA: YES
FLUAV AG NPH QL: NEGATIVE
FLUBV AG NPH QL: NEGATIVE

## 2018-01-29 PROCEDURE — 87804 INFLUENZA ASSAY W/OPTIC: CPT | Mod: 59,QW,S$GLB, | Performed by: NURSE PRACTITIONER

## 2018-01-29 PROCEDURE — 99214 OFFICE O/P EST MOD 30 MIN: CPT | Mod: S$GLB,,, | Performed by: NURSE PRACTITIONER

## 2018-01-29 RX ORDER — OSELTAMIVIR PHOSPHATE 75 MG/1
75 CAPSULE ORAL 2 TIMES DAILY
Qty: 10 CAPSULE | Refills: 0 | Status: SHIPPED | OUTPATIENT
Start: 2018-01-29 | End: 2018-02-03

## 2018-01-29 NOTE — PATIENT INSTRUCTIONS
Influenza (Adult)    Influenza is also called the flu. It is a viral illness that affects the air passages of your lungs. It is different from the common cold. The flu can easily be passed from one to person to another. It may be spread through the air by coughing and sneezing. Or it can be spread by touching the sick person and then touching your own eyes, nose, or mouth.  The flu starts 1 to 3 days after you are exposed to the flu virus. It may last for 1 to 2 weeks but many people feel tired or fatigued for many weeks afterward. You usually dont need to take antibiotics unless you have a complication. This might be an ear or sinus infection or pneumonia.  Symptoms of the flu may be mild or severe. They can include extreme tiredness (wanting to stay in bed all day), chills, fevers, muscle aches, soreness with eye movement, headache, and a dry, hacking cough.  Home care  Follow these guidelines when caring for yourself at home:  · Avoid being around cigarette smoke, whether yours or other peoples.  · Acetaminophen or ibuprofen will help ease your fever, muscle aches, and headache. Dont give aspirin to anyone younger than 18 who has the flu. Aspirin can harm the liver.  · Nausea and loss of appetite are common with the flu. Eat light meals. Drink 6 to 8 glasses of liquids every day. Good choices are water, sport drinks, soft drinks without caffeine, juices, tea, and soup. Extra fluids will also help loosen secretions in your nose and lungs.  · Over-the-counter cold medicines will not make the flu go away faster. But the medicines may help with coughing, sore throat, and congestion in your nose and sinuses. Dont use a decongestant if you have high blood pressure.  · Stay home until your fever has been gone for at least 24 hours without using medicine to reduce fever.  Follow-up care  Follow up with your healthcare provider, or as advised, if you are not getting better over the next week.  If you are age 65 or  older, talk with your provider about getting a pneumococcal vaccine every 5 years. You should also get this vaccine if you have chronic asthma or COPD. All adults should get a flu vaccine every fall. Ask your provider about this.  When to seek medical advice  Call your healthcare provider right away if any of these occur:  · Cough with lots of colored mucus (sputum) or blood in your mucus  · Chest pain, shortness of breath, wheezing, or trouble breathing  · Severe headache, or face, neck, or ear pain  · New rash with fever  · Fever of 100.4°F (38°C) or higher, or as directed by your healthcare provider  · Confusion, behavior change, or seizure  · Severe weakness or dizziness  · You get a new fever or cough after getting better for a few days  Date Last Reviewed: 1/1/2017 © 2000-2017 [employer]. [address] [place] [address]. All rights reserved. This information is not intended as a substitute for professional medical care. Always follow your healthcare professional's instructions.    Symptomatic treatment:    Tylenol every 4 hours  Ibuprofen ever 6 hours  salt water gargles  Cold-eeze helps to reduce the duration of sore throat symptoms  Cepachol helps to numb the discomfort  Chloroseptic spray  Nasal saline spray reduces inflammation and dryness  Warm face compresses as often as you can  Vicks vapor rub at night  Flonase OTC or Nasacort OTC  Simple foods like chicken noodle soup help  Delsym helps with coughing at night  Zyrtec/Claritin during the day and Benadryl at night may help if allergy component   Rest as much as you can                                                          If we discussed that I think your illness is viral it will not respond to antibiotics and it will last 10-14 days.  Flonase (fluticasone) is a nasal spray which is available over the counter and may help with your symptoms   Zyrtec D, Claritin D or allegra D can also help with symptoms of congestion and  "drainage.   If you have hypertension avoid using the "D" which is the decongestant   If you just have drainage you can take plain zyrtec, claritin or allegra   If you just have a congested feeling you can take pseudoephedrine (unless you have high blood pressure) which you have to sign for behind the counter. Do not buy the phenylephrine which is on the shelf as it is not effective   Tylenol or ibuprofen can also be used as directed for pain unless you have an allergy to them or medical condition such as stomach ulcers, kidney or liver disease or blood thinners etc for which you should not be taking these type of medications.   If you are flying in the next few days Afrin nose drops for the airplane flight upon take off and landing may help. Other than at those times refrain from using afrin.       Please arrange follow up with your primary medical clinic as soon as possible. You must understand that you've received an Urgent Care treatment only and that you may be released before all of your medical problems are known or treated. You, the patient, will arrange for follow up as instructed. If your symptoms worsen or fail to improve you should go to the Emergency Room.      "

## 2018-01-29 NOTE — PROGRESS NOTES
Subjective:       Patient ID: Ghada Manley is a 37 y.o. female.    Vitals:    01/29/18 1426   BP: 121/73   Pulse: 92   Resp: 16   Temp: 99.1 °F (37.3 °C)       Chief Complaint: Cough    Patient started yesterday with dry cough,fever and chills. + exposure to influenza at home, pt requesting to be tested for flu and be given Tamiflu regardless of results of test. Pt tearful upon exam.       Cough   Associated symptoms include chills, a fever (at home yesterday 99.6), headaches and a sore throat. Pertinent negatives include no chest pain, ear pain, eye redness, myalgias, shortness of breath or wheezing.     Review of Systems   Constitution: Positive for chills, fever (at home yesterday 99.6) and malaise/fatigue.        Body ache   HENT: Positive for sore throat. Negative for congestion, ear pain and hoarse voice.    Eyes: Negative for discharge and redness.   Cardiovascular: Negative for chest pain, dyspnea on exertion and leg swelling.   Respiratory: Positive for cough (dry). Negative for shortness of breath, sputum production and wheezing.    Musculoskeletal: Negative for myalgias.   Gastrointestinal: Negative for abdominal pain and nausea.   Neurological: Positive for headaches.   All other systems reviewed and are negative.      Objective:      Physical Exam   Constitutional: She is oriented to person, place, and time. She appears well-developed and well-nourished. She is cooperative.  Non-toxic appearance. She does not appear ill. No distress.   HENT:   Head: Normocephalic and atraumatic.   Right Ear: Hearing, tympanic membrane, external ear and ear canal normal.   Left Ear: Hearing, tympanic membrane, external ear and ear canal normal.   Nose: Rhinorrhea present. No mucosal edema or nasal deformity. No epistaxis. Right sinus exhibits no maxillary sinus tenderness and no frontal sinus tenderness. Left sinus exhibits no maxillary sinus tenderness and no frontal sinus tenderness.   Mouth/Throat: Uvula is  midline and mucous membranes are normal. No trismus in the jaw. Normal dentition. No uvula swelling. Posterior oropharyngeal erythema present.   Eyes: Conjunctivae and lids are normal. No scleral icterus.   Sclera clear bilat   Neck: Trachea normal, full passive range of motion without pain and phonation normal. Neck supple.   Cardiovascular: Normal rate, regular rhythm, normal heart sounds, intact distal pulses and normal pulses.    Pulmonary/Chest: Effort normal and breath sounds normal. No respiratory distress.   Abdominal: Soft. Normal appearance and bowel sounds are normal. She exhibits no distension. There is no tenderness.   Musculoskeletal: Normal range of motion. She exhibits no edema or deformity.   Lymphadenopathy:        Head (right side): Submandibular adenopathy present. No submental, no tonsillar, no preauricular and no posterior auricular adenopathy present.        Head (left side): Submandibular adenopathy present. No submental, no tonsillar, no preauricular and no posterior auricular adenopathy present.   Neurological: She is alert and oriented to person, place, and time. She exhibits normal muscle tone. Coordination normal.   Skin: Skin is warm, dry and intact. She is not diaphoretic. No pallor.   Psychiatric: She has a normal mood and affect. Her speech is normal and behavior is normal. Judgment and thought content normal. Cognition and memory are normal.   Nursing note and vitals reviewed.      Results for orders placed or performed in visit on 01/29/18   POCT Influenza A/B   Result Value Ref Range    Rapid Influenza A Ag Negative Negative    Rapid Influenza B Ag Negative Negative     Acceptable Yes        Assessment:       1. Exposure to influenza        Plan:     Tamiflu give to pt per requests and + exposure to influenza in the home.   Ghada was seen today for cough.    Diagnoses and all orders for this visit:    Exposure to influenza  -     POCT Influenza A/B    Other  orders  -     oseltamivir (TAMIFLU) 75 MG capsule; Take 1 capsule (75 mg total) by mouth 2 (two) times daily.

## 2018-05-05 DIAGNOSIS — N30.10 INTERSTITIAL CYSTITIS: ICD-10-CM

## 2018-05-06 RX ORDER — TOLTERODINE 4 MG/1
CAPSULE, EXTENDED RELEASE ORAL
Qty: 30 CAPSULE | Refills: 6 | Status: SHIPPED | OUTPATIENT
Start: 2018-05-06 | End: 2018-11-14 | Stop reason: SDUPTHER

## 2018-10-22 DIAGNOSIS — N30.10 INTERSTITIAL CYSTITIS: ICD-10-CM

## 2018-10-23 RX ORDER — AMITRIPTYLINE HYDROCHLORIDE 10 MG/1
TABLET, FILM COATED ORAL
Qty: 60 TABLET | Refills: 0 | Status: SHIPPED | OUTPATIENT
Start: 2018-10-23 | End: 2021-03-11

## 2018-11-07 ENCOUNTER — TELEPHONE (OUTPATIENT)
Dept: UROGYNECOLOGY | Facility: CLINIC | Age: 38
End: 2018-11-07

## 2018-11-07 NOTE — TELEPHONE ENCOUNTER
----- Message from Ghada Dewey sent at 11/7/2018  9:33 AM CST -----  Contact: Patient   Patient called to schedule an appointment. The patient can be reached at [phone].

## 2018-11-14 ENCOUNTER — OFFICE VISIT (OUTPATIENT)
Dept: UROGYNECOLOGY | Facility: CLINIC | Age: 38
End: 2018-11-14
Payer: COMMERCIAL

## 2018-11-14 VITALS — WEIGHT: 133.81 LBS | HEIGHT: 67 IN | BODY MASS INDEX: 21 KG/M2

## 2018-11-14 DIAGNOSIS — K58.9 IRRITABLE BOWEL SYNDROME, UNSPECIFIED TYPE: Primary | ICD-10-CM

## 2018-11-14 DIAGNOSIS — N30.10 INTERSTITIAL CYSTITIS: ICD-10-CM

## 2018-11-14 DIAGNOSIS — N80.9 ENDOMETRIOSIS: ICD-10-CM

## 2018-11-14 DIAGNOSIS — Z80.3 FAMILY HISTORY OF BREAST CANCER: ICD-10-CM

## 2018-11-14 PROCEDURE — 3008F BODY MASS INDEX DOCD: CPT | Mod: CPTII,S$GLB,, | Performed by: OBSTETRICS & GYNECOLOGY

## 2018-11-14 PROCEDURE — 99999 PR PBB SHADOW E&M-EST. PATIENT-LVL III: CPT | Mod: PBBFAC,,, | Performed by: OBSTETRICS & GYNECOLOGY

## 2018-11-14 PROCEDURE — 99213 OFFICE O/P EST LOW 20 MIN: CPT | Mod: S$GLB,,, | Performed by: OBSTETRICS & GYNECOLOGY

## 2018-11-14 RX ORDER — AMITRIPTYLINE HYDROCHLORIDE 10 MG/1
10 TABLET, FILM COATED ORAL NIGHTLY
Qty: 90 TABLET | Refills: 6 | Status: SHIPPED | OUTPATIENT
Start: 2018-11-14 | End: 2019-11-20 | Stop reason: SDUPTHER

## 2018-11-14 RX ORDER — AMOXICILLIN AND CLAVULANATE POTASSIUM 875; 125 MG/1; MG/1
TABLET, FILM COATED ORAL
Refills: 0 | COMMUNITY
Start: 2018-11-02 | End: 2018-11-14

## 2018-11-14 RX ORDER — TOLTERODINE 4 MG/1
4 CAPSULE, EXTENDED RELEASE ORAL DAILY
Qty: 90 CAPSULE | Refills: 6 | Status: SHIPPED | OUTPATIENT
Start: 2018-11-14 | End: 2019-11-20 | Stop reason: SDUPTHER

## 2018-11-14 NOTE — PROGRESS NOTES
Urogyn follow up    [facility] [address] [place] [address]    Ghada Manley  [id number]  1980    GYN: Jessica    Ghada Manley is a 38 y.o. here for a urogyn follow up.    1)  BM: +IBS-D.  No constipation.  +food triggers (fructose intolerance).  Stress can flare.  Flares make bladder sx worse.  Pretty well controlled on meds. No hematochezia.  Takes fiber daily.  2)  IC:  --when/how Dx: 2007 Dx IBS.  Then had some irritable bladder with 1st child.  Had sx in 2012--saw [facility] EJ, started on ? Med, was given sample x 3 days--which helped.  Off market, but AZO can help x a few days by not as much.  2014:  Intermittent yeast infection/UTI.  Saw Rosy and was referred here.  Started probiotic on own, and this has stopped all issues.  Only has some clear, vaginal d/c, which is not bothersome.  2015 was seen by [facility] in [place], was Dx with IC--advised to avoid dietary irritants.  Was also told to control endometriosis--was placed on lo lo estrin to control heavy, painful menses (bladder issues worse with this).  Takes OCP continuously, which controls menstrual symptoms.  Had flare around Xmas 2015 (stress/anxiety)--took urogesic blue, but made her feel worse.  Then, stopped.  Saw Dr. Levy (URO)--wanted to do cysto/hydro, instillations, CT due to micro hematuria.  Was given VESIcare (helped urgency, osiris if standing).  Stopped x 1 day, sx really worse, so restarted.  VESIcare makes chest itch?    --typical symptoms with flares: U/F, no dysuria/hesitancy.  Slight relief with emptying but short lived.  Constant low grade vaginal/bladder pain.  Dull, constant.  Will have intermittent cramping.  Current frequency Qh.  Without flare: Q 2-3 hours.  Nocturia 0-1x/PM.  Some UUI and slight MIGNON with bending forward.  Does not typically have UI.    --flare freq: last flare Xmas 2016.  Still having.  Usually Q6 months.    --current regimen: Taking 5 mg daily.  Is  it working? Improved but not idea--mostly with U/F.    --past attempted meds/Tx: urogesic (caused pain).  AZO dulls mildly.  Cystex helps pain most.  Took amitriptyline for years for IBS--stopped b/c wanted to get off meds.    --anxiety/stress: +.  Bladder/bowel worse with this.  Did therapy in past (talk and visualization)--last was few years ago. Controlled: variable, mostly around holidays.  Good support system.   --seasonal allergies: none  --general pelvic pain/dyspareunia: occasional has cramping that feels like mild menstrual cramps, ?Ghost period symptoms.  Has been avoiding sex: act not painful but irritated afterwards.    --no s/sx POP    Initial exam:  PELVIC:    External genitalia:  Normal Bartholins, Skenes and labia bilaterally.  + suprapubic pressure with palpation  Urethra:  No caruncle, diverticulum or masses.  (--) hypermobility.    Vagina:  Atrophy (--) , no bladder masses or tender, no discharge.    Cervix:  normal appearance  Uterus: normal size, contour, position, consistency, mobility, non-tender  Adnexa: Not palpable.  POP-Q:  Deferred.  No obvious POP present with valsalva.     Past Medical History  Past Medical History:   Diagnosis Date    Menarche     Age of onset 11   IBS    Past Surgical History  Past Surgical History:   Procedure Laterality Date     SECTION      SALPINGECTOMY     Dx LSC: endometriosis, adhesiolysis, ?fulguration  c/s x 2  2009: Ectopic pregnancy + unilateral saplingectomy    Hysterectomy: No    Past Ob History     x 0.  C/s x 2 (1st breech).    Largest infant weight: 7#10oz.      Well-woman:  Last pap: , normal per report.  No h/o abnl paps/STDs.   Last mammogram: Baseline 34 y.o (+FH).  Repeat at 39 y.o.   Colonoscopy: , normal per report (hemorrhoids). For IBS.   DEXA: NA    Issues include:  Patient Active Problem List   Diagnosis    Family history of breast cancer    Interstitial cystitis    Irritable bowel syndrome    Endometriosis  "    History since last visit:     1. IBS  --continue fiber  --continue avoiding dietary triggers  --didn't start CBT; has started guided meditation tapes     2. IC  --avoiding dietary irritants  --had flare a few weeks ago  --continues Detrol LA 4 mg daily (no issues).    --started amitriptyline: taking 10 mg nightly--helping but makes a little sleepy; 10 not effective; goes up to 30 mg PRN for mild flares  --exercising  --urine NEG   --consider cystohydrodistension vs. Bladder instillations in not improved  --consider prelief because cystex worked for her  --consider elmiron in the future if needed  --consider PT in the future    3. Endometriosis  --continue OCP's  --controlling will help bladder issues    4.  MIGNON:  --improved with core strengthening  --still has small amounts; wears pantyliner daily--very slight    Medications:    Current Outpatient Medications:     amitriptyline (ELAVIL) 10 MG tablet, TAKE 2 TABLETS EVERY EVENING, Disp: 60 tablet, Rfl: 0    LO LOESTRIN FE 1 mg-10 mcg (24)/10 mcg (2) Tab, , Disp: , Rfl:     tolterodine (DETROL LA) 4 MG 24 hr capsule, Take 1 capsule (4 mg total) by mouth once daily., Disp: 90 capsule, Rfl: 6    amitriptyline (ELAVIL) 10 MG tablet, Take 1 tablet (10 mg total) by mouth every evening., Disp: 90 tablet, Rfl: 6      ROS:  As per HPI.      Exam  Ht 5' 7" (1.702 m)   Wt 60.7 kg (133 lb 13.1 oz)   LMP  (LMP Unknown)   BMI 20.96 kg/m²   General: alert and oriented, no acute distress  Remainder of PE deferred.     Impression  1. Irritable bowel syndrome, unspecified type     2. Interstitial cystitis  amitriptyline (ELAVIL) 10 MG tablet    tolterodine (DETROL LA) 4 MG 24 hr capsule   3. Endometriosis     4. Family history of breast cancer       We reviewed the above issues and discussed options for short-term versus long-term management of her problems.   Plan:   1. IBS  --continue fiber  --continue avoiding dietary triggers  --continue meditation/relaxation " exercises    2. IC  --avoid dietary irritants  --manage stress/ anxiety  --continue detrol LA 4 mg  --continue amitriptyline 10-20 mg daily; may increase shortterm for flares; call us if would like to send to mail order  --consider cystohydrodistension vs. Bladder instillations in not improved  --consider prelief because cystex worked for her  --consider elmiron in the future if needed  --consider PT in the future    3. Endometriosis  --continue OCP's  --controlling will help bladder issues    4. Stress incontinence:  --Empty bladder every 3 hours.  Empty well: wait a minute, lean forward on toilet.    --KEGELS: do 10 in AM and 10 in PM, holding each x 10 seconds.  When you feel urge to go, STOP, KEGEL, and when urge has passed, then go to bathroom.  Consider PT in future.    --do not go overboard with these--watch making pelvic tension worse  --let us know if not helping--can consider PT    5.  RTC 1 year.      20 minutes were spent in face to face time with this patient  100 % of this time was spent in counseling and/or coordination of care  ME@  [facility]  Division of Female Pelvic Medicine and Reconstructive Surgery  Department of Obstetrics & Gynecology

## 2019-11-17 NOTE — PROGRESS NOTES
Urogyn follow up    [facility] [place] [address]   [address] [address]    Ghada Manley  [id number]  1980    GYN: [facility]    Ghada Manley is a 39 y.o. here for a urogyn follow up.    1)  BM: +IBS-D.  No constipation.  +food triggers (fructose intolerance).  Stress can flare.  Flares make bladder sx worse.  Pretty well controlled on meds. No hematochezia.  Takes fiber daily.  2)  IC:  --when/how Dx: 2007 Dx IBS.  Then had some irritable bladder with 1st child.  Had sx in 2012--saw [facility]SE, started on ? Med, was given sample x 3 days--which helped.  Off market, but AZO can help x a few days by not as much.  2014:  Intermittent yeast infection/UTI.  Saw Rosy and was referred here.  Started probiotic on own, and this has stopped all issues.  Only has some clear, vaginal d/c, which is not bothersome.  2015 was seen by [facility] in [place], was Dx with IC--advised to avoid dietary irritants.  Was also told to control endometriosis--was placed on lo lo estrin to control heavy, painful menses (bladder issues worse with this).  Takes OCP continuously, which controls menstrual symptoms.  Had flare around Xmas 2015 (stress/anxiety)--took urogesic blue, but made her feel worse.  Then, stopped.  Saw Dr. Levy (URO)--wanted to do cysto/hydro, instillations, CT due to micro hematuria.  Was given VESIcare (helped urgency, osiris if standing).  Stopped x 1 day, sx really worse, so restarted.  VESIcare makes chest itch?    --typical symptoms with flares: U/F, no dysuria/hesitancy.  Slight relief with emptying but short lived.  Constant low grade vaginal/bladder pain.  Dull, constant.  Will have intermittent cramping.  Current frequency Qh.  Without flare: Q 2-3 hours.  Nocturia 0-1x/PM.  Some UUI and slight MIGNON with bending forward.  Does not typically have UI.    --flare freq: last flare Xmas 2016.  Still having.  Usually Q6 months.    --current regimen: Taking 5 mg daily.  Is  it working? Improved but not idea--mostly with U/F.    --past attempted meds/Tx: urogesic (caused pain).  AZO dulls mildly.  Cystex helps pain most.  Took amitriptyline for years for IBS--stopped b/c wanted to get off meds.    --anxiety/stress: +.  Bladder/bowel worse with this.  Did therapy in past (talk and visualization)--last was few years ago. Controlled: variable, mostly around holidays.  Good support system.   --seasonal allergies: none  --general pelvic pain/dyspareunia: occasional has cramping that feels like mild menstrual cramps, ?Ghost period symptoms.  Has been avoiding sex: act not painful but irritated afterwards.    --no s/sx POP    Initial exam:  PELVIC:    External genitalia:  Normal Bartholins, Skenes and labia bilaterally.  + suprapubic pressure with palpation  Urethra:  No caruncle, diverticulum or masses.  (--) hypermobility.    Vagina:  Atrophy (--) , no bladder masses or tender, no discharge.    Cervix:  normal appearance  Uterus: normal size, contour, position, consistency, mobility, non-tender  Adnexa: Not palpable.  POP-Q:  Deferred.  No obvious POP present with valsalva.     Past Medical History  Past Medical History:   Diagnosis Date    Menarche     Age of onset 11   IBS    Past Surgical History  Past Surgical History:   Procedure Laterality Date     SECTION      SALPINGECTOMY     Dx LSC: endometriosis, adhesiolysis, ?fulguration  c/s x 2  2009: Ectopic pregnancy + unilateral saplingectomy    Hysterectomy: No    Past Ob History     x 0.  C/s x 2 (1st breech).    Largest infant weight: 7#10oz.      Well-woman:  Last pap: , normal per report.  No h/o abnl paps/STDs.   Last mammogram: Baseline 34 y.o (+FH).  Repeat at 39 y.o.   Colonoscopy: , normal per report (hemorrhoids). For IBS.   DEXA: NA    Issues include:  Patient Active Problem List   Diagnosis    Family history of breast cancer    Interstitial cystitis    Irritable bowel syndrome    Endometriosis  "    History since last visit:     1. IBS  --has had some intermittent cramping from SP to back area--resolves quickly  --continues fiber + probiotic  --continue avoiding dietary triggers  --continues guided meditation when things flare  --exercise helps    2. IC  --avoiding dietary irritants  --had flare a few weeks ago  --continues Detrol LA 4 mg daily (no issues).    --started amitriptyline: taking 10 mg nightly--helping but makes a little sleepy; 10 not effective; goes up to 30 mg PRN for mild flares  --exercising  --urine NEG   --consider cystohydrodistension vs. Bladder instillations in not improved  --consider prelief because cystex worked for her  --consider elmiron in the future if needed  --consider PT in the future    3. Endometriosis  --continue OCP's  --controlling will help bladder issues    4.  MIGNON:  --not really bothersome  --improved with core strengthening  --last visit: still has small amounts; wears pantyliner daily--very slight  --today: no more liners with core strengthening    Medications:    Current Outpatient Medications:     amitriptyline (ELAVIL) 10 MG tablet, Take 1 tablet (10 mg total) by mouth every evening., Disp: 90 tablet, Rfl: 6    LO LOESTRIN FE 1 mg-10 mcg (24)/10 mcg (2) Tab, , Disp: , Rfl:     tolterodine (DETROL LA) 4 MG 24 hr capsule, Take 1 capsule (4 mg total) by mouth once daily., Disp: 90 capsule, Rfl: 6    amitriptyline (ELAVIL) 10 MG tablet, TAKE 2 TABLETS EVERY EVENING (Patient not taking: Reported on 11/20/2019), Disp: 60 tablet, Rfl: 0      ROS:  As per HPI.      Exam  /74 (BP Location: Right arm, Patient Position: Sitting, BP Method: Medium (Manual))   Ht 5' 1" (1.549 m)   Wt 61 kg (134 lb 7.7 oz)   BMI 25.41 kg/m²   General: alert and oriented, no acute distress  Remainder of PE deferred.     Impression  1. Irritable bowel syndrome with both constipation and diarrhea     2. Interstitial cystitis  tolterodine (DETROL LA) 4 MG 24 hr capsule    " amitriptyline (ELAVIL) 10 MG tablet   3. Family history of breast cancer     4. Endometriosis       We reviewed the above issues and discussed options for short-term versus long-term management of her problems.   Plan:   1. IBS  --continue fiber + probiotic  --continue avoiding dietary triggers  --continue meditation/relaxation exercises    2. IC  --avoid dietary irritants  --manage stress/ anxiety  --continue detrol LA 4 mg  --continue amitriptyline 10-20 mg daily; may increase shortterm for flares; call us if would like to send to mail order  --consider cystohydrodistension vs. Bladder instillations in not improved  --consider prelief because cystex worked for her  --consider elmiron in the future if needed  --consider PT in the future    3. Endometriosis  --continue OCP's  --controlling will help bladder issues    4. Stress incontinence:  --Empty bladder every 3 hours.  Empty well: wait a minute, lean forward on toilet.    --KEGELS: do 10 in AM and 10 in PM, holding each x 10 seconds.  When you feel urge to go, STOP, KEGEL, and when urge has passed, then go to bathroom.  Consider PT in future.    --do not go overboard with these--watch making pelvic tension worse  --let us know if not helping--can consider PT    5.  Med refills provided.  See GYN yearly for well-woman.  RTC 1 year.      20 minutes were spent in face to face time with this patient  100 % of this time was spent in counseling and/or coordination of care  ME@  [facility]  Division of Female Pelvic Medicine and Reconstructive Surgery  Department of Obstetrics & Gynecology

## 2019-11-20 ENCOUNTER — OFFICE VISIT (OUTPATIENT)
Dept: UROGYNECOLOGY | Facility: CLINIC | Age: 39
End: 2019-11-20
Payer: COMMERCIAL

## 2019-11-20 VITALS
DIASTOLIC BLOOD PRESSURE: 74 MMHG | SYSTOLIC BLOOD PRESSURE: 110 MMHG | HEIGHT: 61 IN | BODY MASS INDEX: 25.39 KG/M2 | WEIGHT: 134.5 LBS

## 2019-11-20 DIAGNOSIS — N80.9 ENDOMETRIOSIS: ICD-10-CM

## 2019-11-20 DIAGNOSIS — K58.2 IRRITABLE BOWEL SYNDROME WITH BOTH CONSTIPATION AND DIARRHEA: Primary | ICD-10-CM

## 2019-11-20 DIAGNOSIS — Z80.3 FAMILY HISTORY OF BREAST CANCER: ICD-10-CM

## 2019-11-20 DIAGNOSIS — N30.10 INTERSTITIAL CYSTITIS: ICD-10-CM

## 2019-11-20 PROCEDURE — 99213 OFFICE O/P EST LOW 20 MIN: CPT | Mod: S$GLB,,, | Performed by: OBSTETRICS & GYNECOLOGY

## 2019-11-20 PROCEDURE — 99999 PR PBB SHADOW E&M-EST. PATIENT-LVL III: CPT | Mod: PBBFAC,,, | Performed by: OBSTETRICS & GYNECOLOGY

## 2019-11-20 PROCEDURE — 99999 PR PBB SHADOW E&M-EST. PATIENT-LVL III: ICD-10-PCS | Mod: PBBFAC,,, | Performed by: OBSTETRICS & GYNECOLOGY

## 2019-11-20 PROCEDURE — 99213 PR OFFICE/OUTPT VISIT, EST, LEVL III, 20-29 MIN: ICD-10-PCS | Mod: S$GLB,,, | Performed by: OBSTETRICS & GYNECOLOGY

## 2019-11-20 PROCEDURE — 3008F PR BODY MASS INDEX (BMI) DOCUMENTED: ICD-10-PCS | Mod: CPTII,S$GLB,, | Performed by: OBSTETRICS & GYNECOLOGY

## 2019-11-20 PROCEDURE — 3008F BODY MASS INDEX DOCD: CPT | Mod: CPTII,S$GLB,, | Performed by: OBSTETRICS & GYNECOLOGY

## 2019-11-20 RX ORDER — AMITRIPTYLINE HYDROCHLORIDE 10 MG/1
10 TABLET, FILM COATED ORAL NIGHTLY
Qty: 90 TABLET | Refills: 6 | Status: SHIPPED | OUTPATIENT
Start: 2019-11-20 | End: 2020-11-18 | Stop reason: SDUPTHER

## 2019-11-20 RX ORDER — TOLTERODINE 4 MG/1
4 CAPSULE, EXTENDED RELEASE ORAL DAILY
Qty: 90 CAPSULE | Refills: 6 | Status: SHIPPED | OUTPATIENT
Start: 2019-11-20 | End: 2020-11-18 | Stop reason: SDUPTHER

## 2019-11-20 NOTE — PATIENT INSTRUCTIONS
1. IBS  --continue fiber + probiotic  --continue avoiding dietary triggers  --continue meditation/relaxation exercises    2. IC  --avoid dietary irritants  --manage stress/ anxiety  --continue detrol LA 4 mg  --continue amitriptyline 10-20 mg daily; may increase shortterm for flares; call us if would like to send to mail order  --consider cystohydrodistension vs. Bladder instillations in not improved  --consider prelief because cystex worked for her  --consider elmiron in the future if needed  --consider PT in the future    3. Endometriosis  --continue OCP's  --controlling will help bladder issues    4. Stress incontinence:  --Empty bladder every 3 hours.  Empty well: wait a minute, lean forward on toilet.    --KEGELS: do 10 in AM and 10 in PM, holding each x 10 seconds.  When you feel urge to go, STOP, KEGEL, and when urge has passed, then go to bathroom.  Consider PT in future.    --do not go overboard with these--watch making pelvic tension worse  --let us know if not helping--can consider PT    5.  RTC 1 year.

## 2019-12-20 ENCOUNTER — PATIENT OUTREACH (OUTPATIENT)
Dept: ADMINISTRATIVE | Facility: HOSPITAL | Age: 39
End: 2019-12-20

## 2019-12-20 NOTE — PROGRESS NOTES
Pre-visit chart review completed.  Immunizations reviewed and updated.    Patient due for the following    Lipid Panel     Pap Smear with HPV Cotest      Patient new to provider.

## 2020-01-06 NOTE — PROGRESS NOTES
[facility] Note    Chief Complaint      Chief Complaint   Patient presents with    [facility]    Annual Exam       History of Present Illness      Ghada Manley is a 39 y.o. WF with IBS, IC, Endometriosis, Stress Urinary Incontinence, Fam h/o Breast CA presents to est PCP and for well visit.     IBS - She avoids Fructose due to Fructose intolerance.  She manages this with diet and stress control.     IC - Fu by Dr. Spencer. Pt on Amitriptyline.  Fu by Dr. Spencer.    Stress Incontinence - Pt on Detrol LA.  Fu by Dr. Spencer.     Family h/o Breast CA - Mom, Mat GM, and Mat Aunt - they were BRCA neg.  Pt lifetime risk 34 % per recent MGM. Refer to Breast Surgeon for further discussion about inc testing including poss MRI breast and genetic testing. Note - TP on Lo-Loestrin OCP for Dysmenorrhea.     HCM - Flu - 10/22/19; Tdap - 17; MGM - 3/22/19 At [facility];  PAP - 19 - neg per pt;  Uro/GYN - Dr. Spencer; Ob/GYN - Dr. Lopez; Ophtho - Dr. Morgan; Derm - Dr. Cervantes    Past Medical History:  Past Medical History:   Diagnosis Date    Dysmenorrhea     Ectopic pregnancy     Endometriosis     IBS (irritable bowel syndrome)     Interstitial cystitis     Menarche     Age of onset 11    Ocular migraine     Urine, incontinence, stress female        Past Surgical History:   has a past surgical history that includes Salpingectomy;  section; and Laparoscopy ().    Family History:  family history includes Breast cancer (age of onset: 28) in her maternal aunt; Breast cancer (age of onset: 61) in her mother; Breast cancer (age of onset: 70) in her maternal grandmother; Diabetes in her father, maternal grandfather, and maternal grandmother; Hypertension in her father and maternal grandfather.     Social History:  Social History     Tobacco Use    Smoking status: Never Smoker    Smokeless tobacco: Never Used   Substance Use Topics    Alcohol use: Yes     Frequency: 4 or  more times a week     Drinks per session: 1 or 2    Drug use: Never       Review of Systems   Constitutional: Negative for chills, diaphoresis and fever.   HENT: Negative for congestion and hearing loss.    Eyes: Negative for blurred vision, double vision and discharge.        Wears glasses   Respiratory: Negative for cough and wheezing.    Cardiovascular: Positive for palpitations. Negative for chest pain.        1 episode after drinking 1.5 glasses wine - resolved after < 5 minutes.  .  No assoc SOB, or dizziness. Felt a little tight   Gastrointestinal: Positive for heartburn. Negative for abdominal pain, blood in stool, constipation, diarrhea, melena and vomiting.   Genitourinary: Negative for dysuria and hematuria.        Rare leakage - does well on meds   Musculoskeletal: Negative for joint pain, myalgias and neck pain.   Skin: Negative for itching and rash.   Neurological: Negative for weakness and headaches.   Endo/Heme/Allergies: Negative for polydipsia.   Psychiatric/Behavioral: Negative for depression. The patient is nervous/anxious.         Stable.         Medications:  Outpatient Encounter Medications as of 1/7/2020   Medication Sig Note Dispense Refill    amitriptyline (ELAVIL) 10 MG tablet Take 1 tablet (10 mg total) by mouth every evening.  90 tablet 6    LO LOESTRIN FE 1 mg-10 mcg (24)/10 mcg (2) Tab  3/8/2017: Received from: External Pharmacy      tolterodine (DETROL LA) 4 MG 24 hr capsule Take 1 capsule (4 mg total) by mouth once daily.  90 capsule 6    amitriptyline (ELAVIL) 10 MG tablet TAKE 2 TABLETS EVERY EVENING (Patient not taking: Reported on 11/20/2019)  60 tablet 0     No facility-administered encounter medications on file as of 1/7/2020.        Current Outpatient Medications:     amitriptyline (ELAVIL) 10 MG tablet, Take 1 tablet (10 mg total) by mouth every evening., Disp: 90 tablet, Rfl: 6    LO LOESTRIN FE 1 mg-10 mcg (24)/10 mcg (2) Tab, , Disp: , Rfl:     tolterodine  "(DETROL LA) 4 MG 24 hr capsule, Take 1 capsule (4 mg total) by mouth once daily., Disp: 90 capsule, Rfl: 6    amitriptyline (ELAVIL) 10 MG tablet, TAKE 2 TABLETS EVERY EVENING (Patient not taking: Reported on 11/20/2019), Disp: 60 tablet, Rfl: 0    Allergies:  Review of patient's allergies indicates:   Allergen Reactions    Ciprofloxacin (bulk) Anaphylaxis    Codeine Nausea Only       Health Maintenance:  Immunization History   Administered Date(s) Administered    Influenza 11/11/2016    Influenza - Quadrivalent - PF (6 months and older) 09/20/2017, 10/15/2018, 10/22/2019    Influenza - Trivalent (ADULT) 11/12/2013    Tdap 09/20/2017      Health Maintenance   Topic Date Due    Lipid Panel  1980    Mammogram  03/22/2020    Pap Smear with HPV Cotest  02/26/2022    TETANUS VACCINE  09/20/2027      Objective:      Vital Signs  Temp: 98.3 °F (36.8 °C)  Temp src: Oral  Pulse: 100  Resp: 18  SpO2: 99 %  BP: 115/72  BP Location: Left arm  Patient Position: Sitting  Pain Score: 0-No pain  Height and Weight  Height: 5' 7" (170.2 cm)  Weight: 60.5 kg (133 lb 6.1 oz)  BSA (Calculated - sq m): 1.69 sq meters  BMI (Calculated): 20.9  Weight in (lb) to have BMI = 25: 159.3]    Laboratory:    URINALYSIS:  Recent Labs   Lab 03/08/17  1207   Color, UA Yellow   Specific Gravity, UA 1.005   pH, UA 7.0   Protein, UA Negative   Bilirubin (UA) Negative      Recent Labs   Lab 03/08/17  1207   Nitrite, UA Negative   Leukocytes, UA Negative   Urobilinogen, UA Negative      Physical Exam   Constitutional: She is oriented to person, place, and time. She appears well-developed and well-nourished. No distress.   HENT:   Head: Normocephalic and atraumatic.   Right Ear: External ear normal.   Left Ear: External ear normal.   Mouth/Throat: Oropharynx is clear and moist.   Eyes: Pupils are equal, round, and reactive to light. Conjunctivae and EOM are normal.   Neck: Normal range of motion. Neck supple. No thyromegaly present. "   Cardiovascular: Normal rate, regular rhythm, normal heart sounds and intact distal pulses.   No murmur heard.  Pulmonary/Chest: Effort normal and breath sounds normal. No respiratory distress.   Abdominal: Soft. Bowel sounds are normal. She exhibits no distension.   Musculoskeletal: Normal range of motion.   Neurological: She is alert and oriented to person, place, and time.   Skin: Skin is warm and dry. She is not diaphoretic.   Psychiatric: She has a normal mood and affect.   Nursing note and vitals reviewed.          Assessment:       1. Normal physical exam, routine    2. Family history of breast cancer    3. Screening for lipoid disorders        Ghada Manley is a 39 y.o.female with:    1. Normal physical exam, routine  - CBC auto differential; Future  - Comprehensive metabolic panel; Future  - T4, free; Future  - TSH; Future  -Performed today.  Will check Basic labs.  RTC in 1 yr for fu or sooner if needed. EKG performed today- Biatrial enlargement.    2. Family history of breast cancer  - Ambulatory Referral to Breast Surgery    3. Screening for lipoid disorders  - Lipid panel; Future       Chronic conditions status updated as per HPI.  Other than changes above, cont current medications and maintain follow up with specialists.  Return to clinic in 12 months for well visit or sooner if needed.    Tatyana Faustin MD  [facility]                Answers for HPI/ROS submitted by the patient on 1/6/2020   activity change: No  unexpected weight change: No  rhinorrhea: No  trouble swallowing: No  visual disturbance: No  chest tightness: Yes  polyuria: No  difficulty urinating: No  menstrual problem: No  joint swelling: No  arthralgias: No  confusion: No  dysphoric mood: No

## 2020-01-07 ENCOUNTER — LAB VISIT (OUTPATIENT)
Dept: LAB | Facility: HOSPITAL | Age: 40
End: 2020-01-07
Attending: INTERNAL MEDICINE
Payer: COMMERCIAL

## 2020-01-07 ENCOUNTER — OFFICE VISIT (OUTPATIENT)
Dept: PRIMARY CARE CLINIC | Facility: CLINIC | Age: 40
End: 2020-01-07
Payer: COMMERCIAL

## 2020-01-07 VITALS
RESPIRATION RATE: 18 BRPM | HEIGHT: 67 IN | WEIGHT: 133.38 LBS | TEMPERATURE: 98 F | HEART RATE: 100 BPM | SYSTOLIC BLOOD PRESSURE: 115 MMHG | DIASTOLIC BLOOD PRESSURE: 72 MMHG | BODY MASS INDEX: 20.93 KG/M2 | OXYGEN SATURATION: 99 %

## 2020-01-07 DIAGNOSIS — Z00.00 NORMAL PHYSICAL EXAM, ROUTINE: ICD-10-CM

## 2020-01-07 DIAGNOSIS — Z80.3 FAMILY HISTORY OF BREAST CANCER: ICD-10-CM

## 2020-01-07 DIAGNOSIS — Z13.220 SCREENING FOR LIPOID DISORDERS: ICD-10-CM

## 2020-01-07 DIAGNOSIS — Z00.00 NORMAL PHYSICAL EXAM, ROUTINE: Primary | ICD-10-CM

## 2020-01-07 LAB
ALBUMIN SERPL BCP-MCNC: 4.1 G/DL (ref 3.5–5.2)
ALP SERPL-CCNC: 43 U/L (ref 55–135)
ALT SERPL W/O P-5'-P-CCNC: 16 U/L (ref 10–44)
ANION GAP SERPL CALC-SCNC: 9 MMOL/L (ref 8–16)
AST SERPL-CCNC: 20 U/L (ref 10–40)
BASOPHILS # BLD AUTO: 0.05 K/UL (ref 0–0.2)
BASOPHILS NFR BLD: 1 % (ref 0–1.9)
BILIRUB SERPL-MCNC: 0.5 MG/DL (ref 0.1–1)
BUN SERPL-MCNC: 8 MG/DL (ref 6–20)
CALCIUM SERPL-MCNC: 9.4 MG/DL (ref 8.7–10.5)
CHLORIDE SERPL-SCNC: 106 MMOL/L (ref 95–110)
CHOLEST SERPL-MCNC: 177 MG/DL (ref 120–199)
CHOLEST/HDLC SERPL: 2.3 {RATIO} (ref 2–5)
CO2 SERPL-SCNC: 23 MMOL/L (ref 23–29)
CREAT SERPL-MCNC: 1 MG/DL (ref 0.5–1.4)
DIFFERENTIAL METHOD: ABNORMAL
EOSINOPHIL # BLD AUTO: 0.1 K/UL (ref 0–0.5)
EOSINOPHIL NFR BLD: 1 % (ref 0–8)
ERYTHROCYTE [DISTWIDTH] IN BLOOD BY AUTOMATED COUNT: 11.7 % (ref 11.5–14.5)
EST. GFR  (AFRICAN AMERICAN): >60 ML/MIN/1.73 M^2
EST. GFR  (NON AFRICAN AMERICAN): >60 ML/MIN/1.73 M^2
GLUCOSE SERPL-MCNC: 74 MG/DL (ref 70–110)
HCT VFR BLD AUTO: 42.6 % (ref 37–48.5)
HDLC SERPL-MCNC: 78 MG/DL (ref 40–75)
HDLC SERPL: 44.1 % (ref 20–50)
HGB BLD-MCNC: 13.9 G/DL (ref 12–16)
IMM GRANULOCYTES # BLD AUTO: 0 K/UL (ref 0–0.04)
IMM GRANULOCYTES NFR BLD AUTO: 0 % (ref 0–0.5)
LDLC SERPL CALC-MCNC: 85.8 MG/DL (ref 63–159)
LYMPHOCYTES # BLD AUTO: 1 K/UL (ref 1–4.8)
LYMPHOCYTES NFR BLD: 20.1 % (ref 18–48)
MCH RBC QN AUTO: 33.3 PG (ref 27–31)
MCHC RBC AUTO-ENTMCNC: 32.6 G/DL (ref 32–36)
MCV RBC AUTO: 102 FL (ref 82–98)
MONOCYTES # BLD AUTO: 0.4 K/UL (ref 0.3–1)
MONOCYTES NFR BLD: 7.5 % (ref 4–15)
NEUTROPHILS # BLD AUTO: 3.4 K/UL (ref 1.8–7.7)
NEUTROPHILS NFR BLD: 70.4 % (ref 38–73)
NONHDLC SERPL-MCNC: 99 MG/DL
NRBC BLD-RTO: 0 /100 WBC
PLATELET # BLD AUTO: 230 K/UL (ref 150–350)
PMV BLD AUTO: 12.6 FL (ref 9.2–12.9)
POTASSIUM SERPL-SCNC: 4.2 MMOL/L (ref 3.5–5.1)
PROT SERPL-MCNC: 7.3 G/DL (ref 6–8.4)
RBC # BLD AUTO: 4.18 M/UL (ref 4–5.4)
SODIUM SERPL-SCNC: 138 MMOL/L (ref 136–145)
T4 FREE SERPL-MCNC: 0.97 NG/DL (ref 0.71–1.51)
TRIGL SERPL-MCNC: 66 MG/DL (ref 30–150)
TSH SERPL DL<=0.005 MIU/L-ACNC: 2.04 UIU/ML (ref 0.4–4)
WBC # BLD AUTO: 4.77 K/UL (ref 3.9–12.7)

## 2020-01-07 PROCEDURE — 93005 ELECTROCARDIOGRAM TRACING: CPT | Mod: S$GLB,,, | Performed by: INTERNAL MEDICINE

## 2020-01-07 PROCEDURE — 99386 PREV VISIT NEW AGE 40-64: CPT | Mod: S$GLB,,, | Performed by: INTERNAL MEDICINE

## 2020-01-07 PROCEDURE — 36415 COLL VENOUS BLD VENIPUNCTURE: CPT | Mod: PN

## 2020-01-07 PROCEDURE — 99999 PR PBB SHADOW E&M-EST. PATIENT-LVL V: CPT | Mod: PBBFAC,,, | Performed by: INTERNAL MEDICINE

## 2020-01-07 PROCEDURE — 99999 PR PBB SHADOW E&M-EST. PATIENT-LVL V: ICD-10-PCS | Mod: PBBFAC,,, | Performed by: INTERNAL MEDICINE

## 2020-01-07 PROCEDURE — 84443 ASSAY THYROID STIM HORMONE: CPT

## 2020-01-07 PROCEDURE — 99386 PR PREVENTIVE VISIT,NEW,40-64: ICD-10-PCS | Mod: S$GLB,,, | Performed by: INTERNAL MEDICINE

## 2020-01-07 PROCEDURE — 84439 ASSAY OF FREE THYROXINE: CPT

## 2020-01-07 PROCEDURE — 80053 COMPREHEN METABOLIC PANEL: CPT

## 2020-01-07 PROCEDURE — 93005 EKG 12-LEAD: ICD-10-PCS | Mod: S$GLB,,, | Performed by: INTERNAL MEDICINE

## 2020-01-07 PROCEDURE — 80061 LIPID PANEL: CPT

## 2020-01-07 PROCEDURE — 85025 COMPLETE CBC W/AUTO DIFF WBC: CPT

## 2020-01-07 PROCEDURE — 93010 EKG 12-LEAD: ICD-10-PCS | Mod: S$GLB,,, | Performed by: INTERNAL MEDICINE

## 2020-01-07 PROCEDURE — 93010 ELECTROCARDIOGRAM REPORT: CPT | Mod: S$GLB,,, | Performed by: INTERNAL MEDICINE

## 2020-01-11 ENCOUNTER — PATIENT MESSAGE (OUTPATIENT)
Dept: PRIMARY CARE CLINIC | Facility: CLINIC | Age: 40
End: 2020-01-11

## 2020-01-13 ENCOUNTER — TELEPHONE (OUTPATIENT)
Dept: PRIMARY CARE CLINIC | Facility: CLINIC | Age: 40
End: 2020-01-13

## 2020-01-13 DIAGNOSIS — I51.7 BIATRIAL ENLARGEMENT: Primary | ICD-10-CM

## 2020-01-13 NOTE — TELEPHONE ENCOUNTER
----- Message from Anam Patiño sent at 1/13/2020  8:30 AM CST -----  Contact: self   Patient would like to get test results  Name of test (lab, mammo, etc.):   EKG  Date of test:  1/17  Ordering provider: Ramin  Where was the test performed:    Would the patient rather a call back or a response via [facility]?:  Call back  Comments:

## 2020-01-13 NOTE — PROGRESS NOTES
I reviewed labs with pt.  TFT's -wnl.  FLp - controlled. Liver and kidney functions are wnl. Pt is not anemic.  MCV - 102 - consider checking Vit B12 and folate in future.     Dr. TORIBIO

## 2020-01-14 ENCOUNTER — TELEPHONE (OUTPATIENT)
Dept: PRIMARY CARE CLINIC | Facility: CLINIC | Age: 40
End: 2020-01-14

## 2020-01-14 NOTE — TELEPHONE ENCOUNTER
----- Message from Tatyana Faustin MD sent at 1/13/2020  5:04 PM CST -----  I d/w pt.  + Biatrial enlargement.  Will order Echo for reasurrance.  Pt not symptomatic.

## 2020-01-23 ENCOUNTER — CLINICAL SUPPORT (OUTPATIENT)
Dept: CARDIOLOGY | Facility: CLINIC | Age: 40
End: 2020-01-23
Attending: INTERNAL MEDICINE
Payer: COMMERCIAL

## 2020-01-23 VITALS
HEART RATE: 87 BPM | WEIGHT: 133 LBS | SYSTOLIC BLOOD PRESSURE: 118 MMHG | BODY MASS INDEX: 20.88 KG/M2 | HEIGHT: 67 IN | DIASTOLIC BLOOD PRESSURE: 60 MMHG

## 2020-01-23 DIAGNOSIS — I51.7 BIATRIAL ENLARGEMENT: ICD-10-CM

## 2020-01-23 LAB
ASCENDING AORTA: 2.93 CM
AV INDEX (PROSTH): 0.88
AV MEAN GRADIENT: 4 MMHG
AV PEAK GRADIENT: 6 MMHG
AV VALVE AREA: 2.23 CM2
AV VELOCITY RATIO: 0.89
BSA FOR ECHO PROCEDURE: 1.69 M2
CV ECHO LV RWT: 0.29 CM
DOP CALC AO PEAK VEL: 1.26 M/S
DOP CALC AO VTI: 27.61 CM
DOP CALC LVOT AREA: 2.5 CM2
DOP CALC LVOT DIAMETER: 1.8 CM
DOP CALC LVOT PEAK VEL: 1.12 M/S
DOP CALC LVOT STROKE VOLUME: 61.68 CM3
DOP CALCLVOT PEAK VEL VTI: 24.25 CM
E WAVE DECELERATION TIME: 147.54 MSEC
E/A RATIO: 1.09
E/E' RATIO: 5.06 M/S
ECHO LV POSTERIOR WALL: 0.66 CM (ref 0.6–1.1)
FRACTIONAL SHORTENING: 37 % (ref 28–44)
INTERVENTRICULAR SEPTUM: 0.67 CM (ref 0.6–1.1)
LA MAJOR: 4 CM
LA MINOR: 4.09 CM
LA WIDTH: 3 CM
LEFT ATRIUM SIZE: 3 CM
LEFT ATRIUM VOLUME INDEX: 18.2 ML/M2
LEFT ATRIUM VOLUME: 30.94 CM3
LEFT INTERNAL DIMENSION IN SYSTOLE: 2.83 CM (ref 2.1–4)
LEFT VENTRICLE DIASTOLIC VOLUME INDEX: 54.75 ML/M2
LEFT VENTRICLE DIASTOLIC VOLUME: 93.09 ML
LEFT VENTRICLE MASS INDEX: 53 G/M2
LEFT VENTRICLE SYSTOLIC VOLUME INDEX: 17.8 ML/M2
LEFT VENTRICLE SYSTOLIC VOLUME: 30.22 ML
LEFT VENTRICULAR INTERNAL DIMENSION IN DIASTOLE: 4.51 CM (ref 3.5–6)
LEFT VENTRICULAR MASS: 89.99 G
LV LATERAL E/E' RATIO: 4.5 M/S
LV SEPTAL E/E' RATIO: 5.79 M/S
MV PEAK A VEL: 0.74 M/S
MV PEAK E VEL: 0.81 M/S
PISA TR MAX VEL: 2.45 M/S
PULM VEIN S/D RATIO: 1.62
PV PEAK D VEL: 0.39 M/S
PV PEAK S VEL: 0.63 M/S
RA MAJOR: 3.36 CM
RA PRESSURE: 3 MMHG
RA WIDTH: 2.66 CM
RIGHT VENTRICULAR END-DIASTOLIC DIMENSION: 2.66 CM
SINUS: 2.55 CM
STJ: 2.74 CM
TDI LATERAL: 0.18 M/S
TDI SEPTAL: 0.14 M/S
TDI: 0.16 M/S
TR MAX PG: 24 MMHG
TRICUSPID ANNULAR PLANE SYSTOLIC EXCURSION: 2.31 CM
TV REST PULMONARY ARTERY PRESSURE: 27 MMHG

## 2020-01-23 PROCEDURE — 99999 PR PBB SHADOW E&M-EST. PATIENT-LVL II: ICD-10-PCS | Mod: PBBFAC,,,

## 2020-01-23 PROCEDURE — 93306 TTE W/DOPPLER COMPLETE: CPT | Mod: S$GLB,,, | Performed by: INTERNAL MEDICINE

## 2020-01-23 PROCEDURE — 93306 ECHO (CUPID ONLY): ICD-10-PCS | Mod: S$GLB,,, | Performed by: INTERNAL MEDICINE

## 2020-01-23 PROCEDURE — 99999 PR PBB SHADOW E&M-EST. PATIENT-LVL II: CPT | Mod: PBBFAC,,,

## 2020-01-24 ENCOUNTER — PATIENT OUTREACH (OUTPATIENT)
Dept: ADMINISTRATIVE | Facility: HOSPITAL | Age: 40
End: 2020-01-24

## 2020-01-24 NOTE — LETTER
AUTHORIZATION FOR RELEASE OF   CONFIDENTIAL INFORMATION    TO: [facility]    We are seeing Ghada Manley, date of birth 1980, in the clinic at [facility]. Tatyana Faustin MD is the patient's PCP. Ghada Manley has an outstanding lab/procedure at the time we reviewed her chart. In order to help keep her health information updated, she has authorized us to request the following medical record(s):        ( X )  MAMMOGRAM                                      (  )  COLONOSCOPY      (  )  PAP SMEAR                                          (  )  OUTSIDE LAB RESULTS     (  )  DEXA SCAN                                          (  )  EYE EXAM            (  )  FOOT EXAM                                          (  )  ENTIRE RECORD     (  )  OUTSIDE IMMUNIZATIONS                 (  )  _______________         Please fax records to [facility], Nicole Giambrone, MD, [phone]     If you have any questions, please contact [place] at [phone].           Patient Name: Ghada Manley  : 1980  Patient Phone #: [phone]

## 2020-02-10 ENCOUNTER — OFFICE VISIT (OUTPATIENT)
Dept: SURGERY | Facility: CLINIC | Age: 40
End: 2020-02-10
Payer: COMMERCIAL

## 2020-02-10 ENCOUNTER — TELEPHONE (OUTPATIENT)
Dept: SURGERY | Facility: CLINIC | Age: 40
End: 2020-02-10

## 2020-02-10 VITALS
WEIGHT: 136.88 LBS | HEIGHT: 67 IN | HEART RATE: 80 BPM | BODY MASS INDEX: 21.48 KG/M2 | SYSTOLIC BLOOD PRESSURE: 127 MMHG | DIASTOLIC BLOOD PRESSURE: 80 MMHG

## 2020-02-10 DIAGNOSIS — Z12.31 BREAST CANCER SCREENING BY MAMMOGRAM: Primary | ICD-10-CM

## 2020-02-10 DIAGNOSIS — Z91.89 AT HIGH RISK FOR BREAST CANCER: ICD-10-CM

## 2020-02-10 PROCEDURE — 99999 PR PBB SHADOW E&M-EST. PATIENT-LVL III: CPT | Mod: PBBFAC,,, | Performed by: PHYSICIAN ASSISTANT

## 2020-02-10 PROCEDURE — 3008F PR BODY MASS INDEX (BMI) DOCUMENTED: ICD-10-PCS | Mod: CPTII,S$GLB,, | Performed by: PHYSICIAN ASSISTANT

## 2020-02-10 PROCEDURE — 99203 OFFICE O/P NEW LOW 30 MIN: CPT | Mod: S$GLB,,, | Performed by: PHYSICIAN ASSISTANT

## 2020-02-10 PROCEDURE — 99999 PR PBB SHADOW E&M-EST. PATIENT-LVL III: ICD-10-PCS | Mod: PBBFAC,,, | Performed by: PHYSICIAN ASSISTANT

## 2020-02-10 PROCEDURE — 3008F BODY MASS INDEX DOCD: CPT | Mod: CPTII,S$GLB,, | Performed by: PHYSICIAN ASSISTANT

## 2020-02-10 PROCEDURE — 99203 PR OFFICE/OUTPT VISIT, NEW, LEVL III, 30-44 MIN: ICD-10-PCS | Mod: S$GLB,,, | Performed by: PHYSICIAN ASSISTANT

## 2020-02-10 NOTE — TELEPHONE ENCOUNTER
Spoke with pt to let her know we needed her images on a disc from [facility] ----- Message from CRIS Balderas sent at 2/10/2020 11:38 AM CST -----  Hey,  I ordered a mammogram for this patient for March, but her last imaging was at [facility].  I forgot to ask you but we need to request a copy of her disc for comparison.  The imaging report is uploaded in media but it looks like the images were never sent to us.    ~Alison

## 2020-02-10 NOTE — PROGRESS NOTES
BREAST HIGH RISK CLINIC  [facility]  Breast Surgery      Date of Visit:  2/10/2020    Referring provider:  Tatyana Faustin MD  [address] [place] [address]    PCP:  Tatyana Faustin MD    HIGH RISK    Ghada Manley is a 39 y.o. premenopausal female referred for evaluation of an increased risk of breast cancer based on her Tyrer-Cuzick score.  She is here today to discuss options of high risk screening and risk reduction.    Other breast cancer risk factors include family hx on mother's side, mom with breast CA and dense breast tissue.     She denies any history of breast biopsies.  She denies any nipple discharge or palpating any breast masses bilaterally.      Genetic testing: none; mother tested negative.  Ashkenazi inheritance: no  OB/Gyn history:    Number of pregnancies & age at first gestation:  (28)   Age of menarche & menopause:11; N/A   Last menstrual period: 3 years ago (denies having cycle since starting oral birth control for endometriosis).     Body mass index is 21.44 kg/m².   Contraceptive Use/ HRT: OCP currently x 3 years; progesterone only x 1 month about 3 years ago.     Breastfeeding: >6 months   Number of previous biopsies:0    Tyrer-Cuzick Score: 32.4% (re-calculated in clinic today).      Family History: Mother was diagnosed with breast cancer at age 61 and is now 69; negative genetic testing.  Maternal grandmother was diagnosed with breast cancer at age 70 and  at 79.  Maternal aunt was diagnosed with breast cancer at age 28 and is now 63.      Past Medical History:   Diagnosis Date    Dysmenorrhea     Ectopic pregnancy     Endometriosis     IBS (irritable bowel syndrome)     Interstitial cystitis     Menarche     Age of onset 11    Ocular migraine     Urine, incontinence, stress female      Social History     Socioeconomic History    Marital status:      Spouse name: Not on file    Number of children: 2    Years of education:  Not on file    Highest education level: Not on file   Occupational History    Not on file   Social Needs    Financial resource strain: Not on file    Food insecurity:     Worry: Not on file     Inability: Not on file    Transportation needs:     Medical: Not on file     Non-medical: Not on file   Tobacco Use    Smoking status: Never Smoker    Smokeless tobacco: Never Used   Substance and Sexual Activity    Alcohol use: Yes     Frequency: 4 or more times a week     Drinks per session: 1 or 2    Drug use: Never    Sexual activity: Yes     Birth control/protection: OCP, Partner-Vasectomy   Lifestyle    Physical activity:     Days per week: Not on file     Minutes per session: Not on file    Stress: Not on file   Relationships    Social connections:     Talks on phone: Not on file     Gets together: Not on file     Attends Hoahaoism service: Not on file     Active member of club or organization: Not on file     Attends meetings of clubs or organizations: Not on file     Relationship status: Not on file   Other Topics Concern    Not on file   Social History Narrative    Not on file     Review of Systems: Denies any fever or chills.  Denies any chest pain or shortness of breath.       Objective:     Vitals:    02/10/20 0925   BP: 127/80   Pulse: 80      Physical Exam:  HEENT: Normocephalic, atraumatic.    General: alert and oriented; no acute distress.  Breast: No masses or tenderness bilaterally.  No nipple discharge, nipple inversion, or skin changes bilaterally.  Lymph: No adjacent axillary lymphadenopathy bilaterally. No cervical or supraclavicular lymphadenopathy.      Imaging, 3/22/19 Bilateral Screening Mammogram (at [facility]; see media results): BIRADS 2, benign.    Assessment:     This is a 39 y.o. female with an increased risk of breast cancer based on Tyrer Cuzick score of 32.4%.      Plan:   The patient's estimated lifetime risk of Breast Cancer (to age 85) based on the Tyrer-Cuzick 7 risk  assessment model is 32.4 %.  According to the American Cancer Society, patients with a lifetime breast cancer risk of 20% or higher might benefit from supplemental screening tests.    We discussed that she would benefit from annual MRI's in addition to yearly mammograms, alternating imaging every 6 months until age 74.  The pros and cons of MRI screening were presented.  I also recommended two physical exams per year, one can be with her OB/GYN.  Risk reduction options with chemoprevention such as Tamoxifen or Raloxifene were discussed.  These have been shown to lower the risk of breast cancer incidence, however there is no survival benefit in patients who don't have breast cancer.  I explained the most common side effects and risks including hot flashes, thromboembolism, stroke, endometrial cancer, weight changes, and pain.   We also discussed modifiable measures that affect breast cancer risk including diet, exercise, avoiding obesity, and limiting alcohol intake. I recommended at least 30 minutes of cardiovascular exercise 4-5 times per week.  Exercise can lower the relative risk of breast cancer by ~18-20%.  We also discussed that obesity is linked to a higher risk of breast cancer; therefore, exercise is very important.  I recommended proper nutrition with fresh fruits and vegetables and lean meats and avoidance of processed foods.  Non-modifiable risk factors were also discussed such as age at menarche, age at menopause, family history, and age at first pregnancy.  We did discuss hormone replacement therapy as well.  In patients at increased risk, I usually do not recommend HRT for long periods of time.      She will need a mammogram in March, and an MRI of the breast in September.  I will see her back in clinic around that time.    There was nothing concerning on clinical breast exam today.    Alison Stallings PA-C

## 2020-02-10 NOTE — LETTER
February 10, 2020      Tatyana Faustin MD  [address]  [facility] [address]           [facility]  [address]  [facility] [address]  Phone: [phone]  Fax: [phone]          Patient: Ghada Manley   MR Number: [id number]   YOB: 1980   Date of Visit: 2/10/2020       Dear Dr. Tatyana Faustin:    Thank you for referring Ghada Manley to me for evaluation. Attached you will find relevant portions of my assessment and plan of care.    If you have questions, please do not hesitate to call me. I look forward to following Ghada Manley along with you.    Sincerely,    CRIS Balderas    Enclosure  CC:  No Recipients    If you would like to receive this communication electronically, please contact [email or web address] or [phone] to request more information on [employer] Link access.    For providers and/or their staff who would like to refer a patient to [facility], please contact us through our one-stop-shop provider referral line, [facility], at [phone].    If you feel you have received this communication in error or would no longer like to receive these types of communications, please e-mail [email or web address]

## 2020-03-31 ENCOUNTER — PATIENT MESSAGE (OUTPATIENT)
Dept: PRIMARY CARE CLINIC | Facility: CLINIC | Age: 40
End: 2020-03-31

## 2020-03-31 ENCOUNTER — OFFICE VISIT (OUTPATIENT)
Dept: PRIMARY CARE CLINIC | Facility: CLINIC | Age: 40
End: 2020-03-31
Payer: COMMERCIAL

## 2020-03-31 DIAGNOSIS — H93.12 TINNITUS OF LEFT EAR: Primary | ICD-10-CM

## 2020-03-31 PROCEDURE — 99213 PR OFFICE/OUTPT VISIT, EST, LEVL III, 20-29 MIN: ICD-10-PCS | Mod: 95,,, | Performed by: INTERNAL MEDICINE

## 2020-03-31 PROCEDURE — 99213 OFFICE O/P EST LOW 20 MIN: CPT | Mod: 95,,, | Performed by: INTERNAL MEDICINE

## 2020-03-31 NOTE — PROGRESS NOTES
"[facility] Note    Chief Complaint    No chief complaint on file.      History of Present Illness      Ghada Manley is a 39 y.o. WF with IBS, IC, Endometriosis, Stress Urinary Incontinence, Fam h/o Breast CA presents to fu.  Last visit - 1/7/20.    The patient location is: home  The chief complaint leading to consultation is: "thumping in Left er x 10 days"  Visit type: Virtual visit with synchronous audio and video  Total time spent with patient: 10 minutes  Each patient to whom he or she provides medical services by telemedicine is:  (1) informed of the relationship between the physician and patient and the respective role of any other health care provider with respect to management of the patient; and (2) notified that he or she may decline to receive medical services by telemedicine and may withdraw from such care at any time.    Notes:     Ear thumping - Started 10 days ago. Pt has h/o TMJ and reports it has been flaring.  She c/o a spasm in her ear drum.  It is not painful. "I just hear it inside my ear".  It comes and goes and is now more frequent.  Yawning or burping causes it to recur.  No Change in hearing.  Suspect Tinnitus. No vertigo. Pt plans to try to inc Amitriptyline to 20 mg QHS.  She has not been taking any NSAIDS. Rec Tylenol prn pain. Could consider low dose BZD temporarily  if needed. Pt reports inc stress. It "may be a little better when she lies down".       IBS - She avoids Fructose due to Fructose intolerance.  She manages this with diet and stress control.      IC - Fu by Dr. Spencer. Pt on Amitriptyline 10 mg QHS.  Fu by Dr. Spencer.     Stress Incontinence - Pt on Detrol LA.  Fu by Dr. Spencer.      Family h/o Breast CA - Mom, Mat GM, and Mat Aunt - they were BRCA neg.  Pt lifetime risk 34 % per recent MGM. Refer to Breast Surgeon for further discussion about inc testing including poss MRI breast and genetic testing. Note - TP on Lo-Loestrin OCP for Dysmenorrhea. "      HCM - Flu - 10/22/19; Tdap - 17; MGM - 3/22/19 At [facility];  PAP - 18 - neg per pt; Uro/GYN - Dr. Spencer; Ob/GYN - Dr. Lopez; Ophtho - Dr. Morgan; Derm - Dr. Cervantes    Past Medical History:  Past Medical History:   Diagnosis Date    Dysmenorrhea     Ectopic pregnancy     Endometriosis     IBS (irritable bowel syndrome)     Interstitial cystitis     Menarche     Age of onset 11    Ocular migraine     Urine, incontinence, stress female        Past Surgical History:   has a past surgical history that includes Salpingectomy;  section; and Laparoscopy ().    Family History:  family history includes Breast cancer (age of onset: 28) in her maternal aunt; Breast cancer (age of onset: 61) in her mother; Breast cancer (age of onset: 70) in her maternal grandmother; Diabetes in her father, maternal grandfather, and maternal grandmother; Hypertension in her father and maternal grandfather.     Social History:  Social History     Tobacco Use    Smoking status: Never Smoker    Smokeless tobacco: Never Used   Substance Use Topics    Alcohol use: Yes     Frequency: 4 or more times a week     Drinks per session: 1 or 2    Drug use: Never       Review of Systems   Constitutional: Negative for chills and fever.   HENT: Positive for tinnitus. Negative for congestion, ear pain, hearing loss and sore throat.         Left facial pain near jawline and preauricular region.    Respiratory: Negative for cough and shortness of breath.    Gastrointestinal: Negative for nausea and vomiting.   Neurological: Negative for dizziness and headaches.        Medications:  Outpatient Encounter Medications as of 3/31/2020   Medication Sig Note Dispense Refill    amitriptyline (ELAVIL) 10 MG tablet TAKE 2 TABLETS EVERY EVENING  60 tablet 0    amitriptyline (ELAVIL) 10 MG tablet Take 1 tablet (10 mg total) by mouth every evening.  90 tablet 6    LO LOESTRIN FE 1 mg-10 mcg (24)/10 mcg (2) Tab  3/8/2017: Received  from: External Pharmacy      tolterodine (DETROL LA) 4 MG 24 hr capsule Take 1 capsule (4 mg total) by mouth once daily.  90 capsule 6     No facility-administered encounter medications on file as of 3/31/2020.        Current Outpatient Medications:     amitriptyline (ELAVIL) 10 MG tablet, TAKE 2 TABLETS EVERY EVENING, Disp: 60 tablet, Rfl: 0    amitriptyline (ELAVIL) 10 MG tablet, Take 1 tablet (10 mg total) by mouth every evening., Disp: 90 tablet, Rfl: 6    LO LOESTRIN FE 1 mg-10 mcg (24)/10 mcg (2) Tab, , Disp: , Rfl:     tolterodine (DETROL LA) 4 MG 24 hr capsule, Take 1 capsule (4 mg total) by mouth once daily., Disp: 90 capsule, Rfl: 6    Allergies:  Review of patient's allergies indicates:   Allergen Reactions    Ciprofloxacin (bulk) Anaphylaxis    Codeine Nausea Only       Health Maintenance:  Immunization History   Administered Date(s) Administered    Influenza 11/11/2016    Influenza - Quadrivalent - PF (6 months and older) 09/20/2017, 10/15/2018, 10/22/2019    Influenza - Trivalent (ADULT) 11/12/2013    Tdap 09/20/2017      Health Maintenance   Topic Date Due    Mammogram  03/22/2020    Pap Smear with HPV Cotest  02/26/2022    TETANUS VACCINE  09/20/2027    Lipid Panel  Completed      Objective:       ]    Laboratory:  CBC:  Recent Labs   Lab 01/07/20  0859   WBC 4.77   RBC 4.18   Hemoglobin 13.9   Hematocrit 42.6   Platelets 230   Mean Corpuscular Volume 102 H   Mean Corpuscular Hemoglobin 33.3 H   Mean Corpuscular Hemoglobin Conc 32.6       CMP:  Recent Labs   Lab 01/07/20  0859   Glucose 74   Calcium 9.4   Albumin 4.1   Total Protein 7.3   Sodium 138   Potassium 4.2   CO2 23   Chloride 106   BUN, Bld 8   Creatinine 1.0   eGFR if African American >60.0   eGFR if non African American >60.0   Alkaline Phosphatase 43 L   ALT 16   AST 20   Total Bilirubin 0.5       URINALYSIS:              LIPIDS:  Recent Labs   Lab 01/07/20  0859   TSH 2.035   HDL 78 H   Cholesterol 177   Triglycerides 66    LDL Cholesterol 85.8   Hdl/Cholesterol Ratio 44.1   Non-HDL Cholesterol 99   Total Cholesterol/HDL Ratio 2.3       TSH:  Recent Labs   Lab 01/07/20  0859   TSH 2.035     Physical Exam   Constitutional: She is oriented to person, place, and time. She appears well-developed. No distress.   Neurological: She is alert and oriented to person, place, and time.   Skin: She is not diaphoretic.   Psychiatric: She has a normal mood and affect.           Assessment:       1. Tinnitus of left ear        Ghada Manley is a 39 y.o.female with:    1. Tinnitus of left ear  - Ambulatory referral/consult to ENT; Future  Refer to ENT.  Pt plans to try to inc her Amitriptyline to 20 mg QHS to see if this helps.  Pt has been anxious.  She does have TMJ and wears her mouth guard.  Could Consider Low Dose BZD temporarily if needed.      Chronic conditions status updated as per HPI.  Other than changes above, cont current medications and maintain follow up with specialists.  Return to clinic as prev sched or sooner if needed    Tatyana Faustin MD  [facility]

## 2020-04-01 ENCOUNTER — TELEPHONE (OUTPATIENT)
Dept: PRIMARY CARE CLINIC | Facility: CLINIC | Age: 40
End: 2020-04-01

## 2020-04-01 ENCOUNTER — PATIENT OUTREACH (OUTPATIENT)
Dept: ADMINISTRATIVE | Facility: OTHER | Age: 40
End: 2020-04-01

## 2020-04-01 NOTE — TELEPHONE ENCOUNTER
I attempted to contact pt. Her mailbox was full, therefore I was unable to leave a message. I d/w our referral coordinator. Pt will need to call ENT dept directly. We are getting an error when attempting to schedule b/c its saying pt needs an audiogram prior to setting up a visit.

## 2020-04-01 NOTE — TELEPHONE ENCOUNTER
----- Message from Antony Stallings sent at 4/1/2020 10:47 AM CDT -----  Contact: pt  Pt called to have a virtual visit set up from the ENT orders she was given. I tried to set up appt for her but was having trouble getting that done ky her.     Pt would like to speak to someone from your office     Pt can be reached at [phone]

## 2020-04-02 ENCOUNTER — OFFICE VISIT (OUTPATIENT)
Dept: OTOLARYNGOLOGY | Facility: CLINIC | Age: 40
End: 2020-04-02
Payer: COMMERCIAL

## 2020-04-02 DIAGNOSIS — H92.02 EAR DISCOMFORT, LEFT: ICD-10-CM

## 2020-04-02 DIAGNOSIS — G43.109 OCULAR MIGRAINE: Primary | ICD-10-CM

## 2020-04-02 DIAGNOSIS — Z88.9 HISTORY OF SEASONAL ALLERGIES: ICD-10-CM

## 2020-04-02 DIAGNOSIS — F41.9 ANXIETY: ICD-10-CM

## 2020-04-02 DIAGNOSIS — Z87.39 HISTORY OF TMJ DISORDER: ICD-10-CM

## 2020-04-02 PROCEDURE — 99202 PR OFFICE/OUTPT VISIT, NEW, LEVL II, 15-29 MIN: ICD-10-PCS | Mod: 95,,, | Performed by: OTOLARYNGOLOGY

## 2020-04-02 PROCEDURE — 99202 OFFICE O/P NEW SF 15 MIN: CPT | Mod: 95,,, | Performed by: OTOLARYNGOLOGY

## 2020-04-02 NOTE — PROGRESS NOTES
Subjective:       Patient ID: Ghada Manley is a 39 y.o. female.    Chief Complaint: No chief complaint on file.  The patient location is: home  The chief complaint leading to consultation is:left ear vibration  Visit type: Virtual visit with synchronous audio and video  Total time spent with patient: 20 minutes  Each patient to whom he or she provides medical services by telemedicine is:  (1) informed of the relationship between the physician and patient and the respective role of any other health care provider with respect to management of the patient; and (2) notified that he or she may decline to receive medical services by telemedicine and may withdraw from such care at any time.  The city is in the midst of the Coronavirus pandemic presently. Stay at home orders are in effect.    Answers for HPI/ROS submitted by the patient on 4/2/2020   rhinorrhea: Yes  nosebleeds: Yes  postnasal drip: Yes  cough: Yes  nervous/ anxious: Yes    Notes:    HPI : Ms. Manley is a bespectacled 39 year old CF with a hx of IBS, endometriosis and a family hx of breast CA who complains of a thumping or vibration or fluttering sound in her left ear which began spontaneously 2+ weeks ago.  The sensation bothers her off and on. She denies ear pain or hearing loss. She can now alleviate it by placing a foam insert into her left ear canal.  She denies any hx of ear trauma, ear surgery or significant ear infections. She denies noise trauma.  She admits to a hx of ocular migraine as well as left sided TMJ dysfunction.  She wears an oral splint at night according to Dr. ZE Prescott'e note of 3/31/20 during a video visit.   She was thinking about increasing her Elavil dosage. She was also considering adding a low dose benzodiazepine( BZD) . The  visit notes indicate left facial pain near the jawline and in the pre-auricular region.   She was diagnosed with tinnitus of the left ear. She was advised to complete an ambulatory consultation  with the ENT service.  Ms. Manley denies any hx of Bell's pasly.  She indicates mild seasonal allergies ( pine allergens in [place], more here) . She is originally from [place].      Past Medical History:   Diagnosis Date    Dysmenorrhea     Ectopic pregnancy     Endometriosis     IBS (irritable bowel syndrome)     Interstitial cystitis     Menarche     Age of onset 11    Ocular migraine     Urine, incontinence, stress female    Allergies: Ciprofolxacin, codeine  Past Surgical History:   Procedure Laterality Date     SECTION      x 2    LAPAROSCOPY  2001    SALPINGECTOMY      ECTOPIC PREGNANCY     Current Outpatient Medications on File Prior to Visit   Medication Sig Dispense Refill    amitriptyline (ELAVIL) 10 MG tablet TAKE 2 TABLETS EVERY EVENING 60 tablet 0    amitriptyline (ELAVIL) 10 MG tablet Take 1 tablet (10 mg total) by mouth every evening. 90 tablet 6    LO LOESTRIN FE 1 mg-10 mcg (24)/10 mcg (2) Tab       tolterodine (DETROL LA) 4 MG 24 hr capsule Take 1 capsule (4 mg total) by mouth once daily. 90 capsule 6     No current facility-administered medications on file prior to visit.        Review of Systems        Objective:     Vital signs were not recorded  Alert and oriented CF in no acute distress; she is articulate and she does not appear ill  Physical exam is limited to facial appearnce per smartphone ( video telemedicine visit) .  There is no apparent swelling of left face, TMJ, nor left pinna.  Physical Exam    Assessment:       1. Ocular migraine    2. Ear discomfort, left    3. History of TMJ disorder    4. History of seasonal allergies    5. Anxiety      6.     Hx seasonal allergies ( pine )  Plan:       Avoid gum chewing; TMJ sx mitigation encouraged  Pt, may continue use of foam insert into left ear canal prn  Consider non-sedating antihistamine +/- NSS for seasonal allergies prn  Consider ENT consultation in office with audiometry ( to include acoustic reflex testing)  pending course

## 2020-04-02 NOTE — PATIENT INSTRUCTIONS
Avoid gum chewing; TMJ sx mitigation encouraged  May use (foam) insert into AS canal prn; masking techniques may help prn  Consider non-sedating antihistamine +/- NSS for seasonal allergies prn  Call for any significant change in otologic status  Consider ENT consultation in office with audiometry to include acoustic reflex testing prn

## 2020-06-08 ENCOUNTER — OFFICE VISIT (OUTPATIENT)
Dept: PRIMARY CARE CLINIC | Facility: CLINIC | Age: 40
End: 2020-06-08
Payer: COMMERCIAL

## 2020-06-08 ENCOUNTER — LAB VISIT (OUTPATIENT)
Dept: URGENT CARE | Facility: CLINIC | Age: 40
End: 2020-06-08
Payer: COMMERCIAL

## 2020-06-08 ENCOUNTER — PATIENT MESSAGE (OUTPATIENT)
Dept: PRIMARY CARE CLINIC | Facility: CLINIC | Age: 40
End: 2020-06-08

## 2020-06-08 ENCOUNTER — TELEPHONE (OUTPATIENT)
Dept: PRIMARY CARE CLINIC | Facility: CLINIC | Age: 40
End: 2020-06-08

## 2020-06-08 VITALS
TEMPERATURE: 98 F | HEIGHT: 67 IN | HEART RATE: 90 BPM | SYSTOLIC BLOOD PRESSURE: 130 MMHG | WEIGHT: 135 LBS | BODY MASS INDEX: 21.19 KG/M2 | RESPIRATION RATE: 18 BRPM | DIASTOLIC BLOOD PRESSURE: 74 MMHG | OXYGEN SATURATION: 99 %

## 2020-06-08 DIAGNOSIS — R06.2 WHEEZING: ICD-10-CM

## 2020-06-08 DIAGNOSIS — Z20.822 SUSPECTED COVID-19 VIRUS INFECTION: Primary | ICD-10-CM

## 2020-06-08 DIAGNOSIS — Z20.822 SUSPECTED COVID-19 VIRUS INFECTION: ICD-10-CM

## 2020-06-08 LAB — SARS-COV-2 RNA RESP QL NAA+PROBE: NOT DETECTED

## 2020-06-08 PROCEDURE — U0003 INFECTIOUS AGENT DETECTION BY NUCLEIC ACID (DNA OR RNA); SEVERE ACUTE RESPIRATORY SYNDROME CORONAVIRUS 2 (SARS-COV-2) (CORONAVIRUS DISEASE [COVID-19]), AMPLIFIED PROBE TECHNIQUE, MAKING USE OF HIGH THROUGHPUT TECHNOLOGIES AS DESCRIBED BY CMS-2020-01-R: HCPCS

## 2020-06-08 PROCEDURE — 99213 OFFICE O/P EST LOW 20 MIN: CPT | Mod: 95,,, | Performed by: INTERNAL MEDICINE

## 2020-06-08 PROCEDURE — 99213 PR OFFICE/OUTPT VISIT, EST, LEVL III, 20-29 MIN: ICD-10-PCS | Mod: 95,,, | Performed by: INTERNAL MEDICINE

## 2020-06-08 RX ORDER — ALBUTEROL SULFATE 90 UG/1
2 AEROSOL, METERED RESPIRATORY (INHALATION) EVERY 6 HOURS PRN
Qty: 18 G | Refills: 0 | Status: SHIPPED | OUTPATIENT
Start: 2020-06-08 | End: 2021-03-16

## 2020-06-08 NOTE — PROGRESS NOTES
"Subjective:       Patient ID: Ghada Manley is a 40 y.o. female.    Vitals:  height is 5' 7" (1.702 m) and weight is 61.2 kg (135 lb). Her temperature is 97.8 °F (36.6 °C). Her blood pressure is 130/74 and her pulse is 90. Her respiration is 18 and oxygen saturation is 99%.     Chief Complaint: COVID-19 Concerns    Cough   This is a new problem. The current episode started in the past 7 days (2 days). The problem has been gradually worsening. The problem occurs constantly. The cough is productive of sputum. Associated symptoms include myalgias and a sore throat. Pertinent negatives include no chills, ear pain, eye redness, fever, hemoptysis, rash, shortness of breath or wheezing. Nothing aggravates the symptoms. She has tried nothing for the symptoms.       Constitution: Negative for chills, sweating, fatigue and fever.   HENT: Positive for congestion and sore throat. Negative for ear pain, sinus pain, sinus pressure and voice change.    Neck: Negative for painful lymph nodes.   Eyes: Negative for eye redness.   Respiratory: Positive for chest tightness and cough. Negative for sputum production, bloody sputum, COPD, shortness of breath, stridor, wheezing and asthma.    Gastrointestinal: Negative for nausea and vomiting.   Musculoskeletal: Positive for muscle ache.   Skin: Negative for rash.   Allergic/Immunologic: Negative for seasonal allergies and asthma.   Hematologic/Lymphatic: Negative for swollen lymph nodes.       Objective:      Physical Exam      Assessment:       1. Suspected Covid-19 Virus Infection        Plan:         Suspected Covid-19 Virus Infection  -     COVID-19 Routine Screening           "

## 2020-06-08 NOTE — PROGRESS NOTES
"[facility] Note    Chief Complaint    No chief complaint on file.      History of Present Illness      Ghada Manley is a 40 y.o. WF with IBS, IC, Endometriosis, Stress Urinary Incontinence, Fam h/o Breast CA presents to fu c/o Sore throat, fatigue, Bodyaches x 2 days.  Last visit -3/31/20.    The patient location is: "Home"  The chief complaint leading to consultation is:  "Sore throat, fatigue, Bodyaches x 2 days.  "    Visit type: audiovisual    Face to Face time with patient: 15 minutes  16 minutes of total time spent on the encounter, which includes face to face time and non-face to face time preparing to see the patient (eg, review of tests), Obtaining and/or reviewing separately obtained history, Documenting clinical information in the electronic or other health record, Independently interpreting results (not separately reported) and communicating results to the patient/family/caregiver, or Care coordination (not separately reported).         Each patient to whom he or she provides medical services by telemedicine is:  (1) informed of the relationship between the physician and patient and the respective role of any other health care provider with respect to management of the patient; and (2) notified that he or she may decline to receive medical services by telemedicine and may withdraw from such care at any time.    Notes:     Covid 19 Sx's - Sore throat x 2 days.  +Achey and fatigue. Yesterday she woke up with worse Myalgias. No fever.  Sore throat was better last night.  + Tightness in her chest.  No SOB. + dry cough. +wheezing on deep inspiration. No h/o Asthma. Her son has Asthma.     IBS - She avoids Fructose due to Fructose intolerance.  She manages this with diet and stress control.      IC - Fu by Dr. Spencer. Pt on Amitriptyline 10 mg QHS.  Fu by Dr. Spencer.     Stress Incontinence - Pt on Detrol LA.  Fu by Dr. Spencer.      Family h/o Breast CA - Mom, Mat GM, and Mat Aunt - " they were BRCA neg.  Pt lifetime risk 34 % per recent MGM. Refer to Breast Surgeon for further discussion about inc testing including poss MRI breast and genetic testing. Note - TP on Lo-Loestrin OCP for Dysmenorrhea.      HCM - Flu - 10/22/19; Tdap - 17; MGM - 3/22/19 At [facility];  PAP - 19 - neg per pt; Uro/GYN - Dr. Spencer; Ob/GYN - Dr. Lopez; Ophtho - Dr. Morgan; Derm - Dr. Cervantes    Past Medical History:  Past Medical History:   Diagnosis Date    Dysmenorrhea     Ectopic pregnancy     Endometriosis     IBS (irritable bowel syndrome)     Interstitial cystitis     Menarche     Age of onset 11    Ocular migraine     Urine, incontinence, stress female        Past Surgical History:   has a past surgical history that includes Salpingectomy;  section; and Laparoscopy ().    Family History:  family history includes Breast cancer (age of onset: 28) in her maternal aunt; Breast cancer (age of onset: 61) in her mother; Breast cancer (age of onset: 70) in her maternal grandmother; Diabetes in her father, maternal grandfather, and maternal grandmother; Hypertension in her father and maternal grandfather.     Social History:  Social History     Tobacco Use    Smoking status: Never Smoker    Smokeless tobacco: Never Used   Substance Use Topics    Alcohol use: Yes     Frequency: 4 or more times a week     Drinks per session: 1 or 2    Drug use: Never       Review of Systems   Constitutional: Negative for chills, diaphoresis and fever.   HENT: Negative for congestion, ear discharge and ear pain.    Eyes: Negative for discharge and redness.   Respiratory: Positive for cough and wheezing. Negative for shortness of breath and stridor.    Cardiovascular: Positive for chest pain. Negative for palpitations.   Gastrointestinal: Positive for diarrhea. Negative for abdominal pain, constipation, nausea and vomiting.        Yesterday - soft stools -4-5 times yesterday.   Genitourinary: Negative for  dysuria and frequency.   Musculoskeletal: Positive for myalgias. Negative for neck pain.   Neurological: Negative for dizziness and headaches.        Medications:  Outpatient Encounter Medications as of 6/8/2020   Medication Sig Note Dispense Refill    albuterol (PROAIR HFA) 90 mcg/actuation inhaler Inhale 2 puffs into the lungs every 6 (six) hours as needed for Wheezing. Rescue  18 g 0    amitriptyline (ELAVIL) 10 MG tablet TAKE 2 TABLETS EVERY EVENING  60 tablet 0    amitriptyline (ELAVIL) 10 MG tablet Take 1 tablet (10 mg total) by mouth every evening.  90 tablet 6    LO LOESTRIN FE 1 mg-10 mcg (24)/10 mcg (2) Tab  3/8/2017: Received from: External Pharmacy      tolterodine (DETROL LA) 4 MG 24 hr capsule Take 1 capsule (4 mg total) by mouth once daily.  90 capsule 6     No facility-administered encounter medications on file as of 6/8/2020.        Current Outpatient Medications:     albuterol (PROAIR HFA) 90 mcg/actuation inhaler, Inhale 2 puffs into the lungs every 6 (six) hours as needed for Wheezing. Rescue, Disp: 18 g, Rfl: 0    amitriptyline (ELAVIL) 10 MG tablet, TAKE 2 TABLETS EVERY EVENING, Disp: 60 tablet, Rfl: 0    amitriptyline (ELAVIL) 10 MG tablet, Take 1 tablet (10 mg total) by mouth every evening., Disp: 90 tablet, Rfl: 6    LO LOESTRIN FE 1 mg-10 mcg (24)/10 mcg (2) Tab, , Disp: , Rfl:     tolterodine (DETROL LA) 4 MG 24 hr capsule, Take 1 capsule (4 mg total) by mouth once daily., Disp: 90 capsule, Rfl: 6    Allergies:  Review of patient's allergies indicates:   Allergen Reactions    Ciprofloxacin (bulk) Anaphylaxis    Codeine Nausea Only       Health Maintenance:  Immunization History   Administered Date(s) Administered    Influenza 11/11/2016    Influenza - Quadrivalent - PF (6 months and older) 09/20/2017, 10/15/2018, 10/22/2019    Influenza - Trivalent (ADULT) 11/12/2013    Tdap 09/20/2017      Health Maintenance   Topic Date Due    Mammogram  03/22/2020    Pap Smear with HPV  Cotest  02/26/2022    TETANUS VACCINE  09/20/2027    Lipid Panel  Completed      Objective:       ]    Laboratory:  CBC:  Recent Labs   Lab 01/07/20  0859   WBC 4.77   RBC 4.18   Hemoglobin 13.9   Hematocrit 42.6   Platelets 230   Mean Corpuscular Volume 102 H   Mean Corpuscular Hemoglobin 33.3 H   Mean Corpuscular Hemoglobin Conc 32.6       CMP:  Recent Labs   Lab 01/07/20  0859   Glucose 74   Calcium 9.4   Albumin 4.1   Total Protein 7.3   Sodium 138   Potassium 4.2   CO2 23   Chloride 106   BUN, Bld 8   Creatinine 1.0   eGFR if African American >60.0   eGFR if non African American >60.0   Alkaline Phosphatase 43 L   ALT 16   AST 20   Total Bilirubin 0.5       LIPIDS:  Recent Labs   Lab 01/07/20  0859   TSH 2.035   HDL 78 H   Cholesterol 177   Triglycerides 66   LDL Cholesterol 85.8   Hdl/Cholesterol Ratio 44.1   Non-HDL Cholesterol 99   Total Cholesterol/HDL Ratio 2.3       TSH:  Recent Labs   Lab 01/07/20  0859   TSH 2.035   Physical Exam   Constitutional: She is oriented to person, place, and time. She appears well-developed and well-nourished. No distress.   Pulmonary/Chest: No respiratory distress.   Able to speak in full sentences without difficulty.   Neurological: She is alert and oriented to person, place, and time.   Skin: She is not diaphoretic.   Psychiatric:   Tearful at times.           Assessment:       1. Suspected Covid-19 Virus Infection    2. Wheezing        Ghada Manley is a 40 y.o.female with:    1. Suspected Covid-19 Virus Infection  - COVID-19 Routine Screening; Future  - COVID-19 Home Symptom Monitoring  - Duration (days): 14  Rec supportive care.  Rec strict hand hygiene.  Rec APAP/Ibuprofen prn T > 100.3/pain. RTC or alert MD if Symptoms persist/worsen. Pt will monitor for any SOB and alert MD immediately if this occurs.  Will get pt a home Pulse oximeter.  Will Rx Proair prn. Continue to self isolate.  Will check COVID Swab.Pt will got to [facility] for swab.  I have put in  order.  I will check on her daily.     2. Wheezing  - albuterol (PROAIR HFA) 90 mcg/actuation inhaler; Inhale 2 puffs into the lungs every 6 (six) hours as needed for Wheezing. Rescue  Dispense: 18 g; Refill: 0   -Will Rx Proair prn. Pt will go to [facility] for swab.     Chronic conditions status updated as per HPI.  Other than changes above, cont current medications and maintain follow up with specialists.  Fu virtually in 2 wks.      Tatyana Faustin MD  [facility]                  Answers for HPI/ROS submitted by the patient on 6/8/2020   Sore throat  Chronicity: new  Onset: in the past 7 days  Progression since onset: gradually improving  Pain worse on: neither  Fever: no fever  Pain - numeric: 4/10  drooling: No  hoarse voice: Yes  plugged ear sensation: No  swollen glands: No  trouble swallowing: No  strep: No  mono: No  Treatments tried: cool liquids  Improvement on treatment: moderate  Pain severity: mild

## 2020-06-09 NOTE — PROGRESS NOTES
I called and informed pt her Covid swab was negative. She actually feels better today. She denies any SOB.     Dr. TORIBIO

## 2020-10-06 ENCOUNTER — PATIENT MESSAGE (OUTPATIENT)
Dept: SURGERY | Facility: CLINIC | Age: 40
End: 2020-10-06

## 2020-10-09 ENCOUNTER — HOSPITAL ENCOUNTER (OUTPATIENT)
Dept: RADIOLOGY | Facility: OTHER | Age: 40
Discharge: HOME | End: 2020-10-09
Attending: PHYSICIAN ASSISTANT
Payer: COMMERCIAL

## 2020-10-09 DIAGNOSIS — Z12.31 BREAST CANCER SCREENING BY MAMMOGRAM: ICD-10-CM

## 2020-10-09 PROCEDURE — 77067 SCR MAMMO BI INCL CAD: CPT | Mod: 26,,, | Performed by: RADIOLOGY

## 2020-10-09 PROCEDURE — 77063 BREAST TOMOSYNTHESIS BI: CPT | Mod: 26,,, | Performed by: RADIOLOGY

## 2020-10-09 PROCEDURE — 77067 SCR MAMMO BI INCL CAD: CPT | Mod: TC

## 2020-10-09 PROCEDURE — 77063 MAMMO DIGITAL SCREENING BILAT WITH TOMOSYNTHESIS_CAD: ICD-10-PCS | Mod: 26,,, | Performed by: RADIOLOGY

## 2020-10-09 PROCEDURE — 77067 MAMMO DIGITAL SCREENING BILAT WITH TOMOSYNTHESIS_CAD: ICD-10-PCS | Mod: 26,,, | Performed by: RADIOLOGY

## 2020-11-17 ENCOUNTER — PATIENT OUTREACH (OUTPATIENT)
Dept: ADMINISTRATIVE | Facility: OTHER | Age: 40
End: 2020-11-17

## 2020-11-18 ENCOUNTER — OFFICE VISIT (OUTPATIENT)
Dept: UROGYNECOLOGY | Facility: CLINIC | Age: 40
End: 2020-11-18
Payer: COMMERCIAL

## 2020-11-18 ENCOUNTER — TELEPHONE (OUTPATIENT)
Dept: UROGYNECOLOGY | Facility: CLINIC | Age: 40
End: 2020-11-18

## 2020-11-18 ENCOUNTER — PATIENT MESSAGE (OUTPATIENT)
Dept: UROGYNECOLOGY | Facility: CLINIC | Age: 40
End: 2020-11-18

## 2020-11-18 DIAGNOSIS — N30.10 INTERSTITIAL CYSTITIS: ICD-10-CM

## 2020-11-18 DIAGNOSIS — Z80.3 FAMILY HISTORY OF BREAST CANCER: ICD-10-CM

## 2020-11-18 DIAGNOSIS — N80.9 ENDOMETRIOSIS: ICD-10-CM

## 2020-11-18 DIAGNOSIS — K58.2 IRRITABLE BOWEL SYNDROME WITH BOTH CONSTIPATION AND DIARRHEA: Primary | ICD-10-CM

## 2020-11-18 PROCEDURE — 99212 PR OFFICE/OUTPT VISIT, EST, LEVL II, 10-19 MIN: ICD-10-PCS | Mod: 95,,, | Performed by: OBSTETRICS & GYNECOLOGY

## 2020-11-18 PROCEDURE — 99212 OFFICE O/P EST SF 10 MIN: CPT | Mod: 95,,, | Performed by: OBSTETRICS & GYNECOLOGY

## 2020-11-18 RX ORDER — TOLTERODINE 4 MG/1
4 CAPSULE, EXTENDED RELEASE ORAL DAILY
Qty: 90 CAPSULE | Refills: 6 | Status: SHIPPED | OUTPATIENT
Start: 2020-11-18 | End: 2020-12-04

## 2020-11-18 RX ORDER — AMITRIPTYLINE HYDROCHLORIDE 10 MG/1
10 TABLET, FILM COATED ORAL NIGHTLY
Qty: 90 TABLET | Refills: 6 | Status: SHIPPED | OUTPATIENT
Start: 2020-11-18 | End: 2020-11-18 | Stop reason: SDUPTHER

## 2020-11-18 RX ORDER — AMITRIPTYLINE HYDROCHLORIDE 10 MG/1
10 TABLET, FILM COATED ORAL NIGHTLY
Qty: 120 TABLET | Refills: 4 | Status: SHIPPED | OUTPATIENT
Start: 2020-11-18 | End: 2021-03-11

## 2020-11-18 NOTE — TELEPHONE ENCOUNTER
----- Message from Patrice Zamora sent at 11/18/2020 10:29 AM CST -----  Pt has a appt today @ 3:30 she will like to do a VIRTUAL APPT  [phone]

## 2020-11-18 NOTE — PROGRESS NOTES
Urogyn follow up    [facility] UROGYNECOLOGY-[id number]   [address] [address]    Ghada Manley  [id number]  1980    GYN: Jessica    The patient location is: home  The chief complaint leading to consultation is: follow up.     Visit type: audio only    Face to Face time with patient: 15  15 minutes of total time spent on the encounter, which includes face to face time and non-face to face time preparing to see the patient (eg, review of tests), Obtaining and/or reviewing separately obtained history, Documenting clinical information in the electronic or other health record, Independently interpreting results (not separately reported) and communicating results to the patient/family/caregiver, or Care coordination (not separately reported).     Each patient to whom he or she provides medical services by telemedicine is:  (1) informed of the relationship between the physician and patient and the respective role of any other health care provider with respect to management of the patient; and (2) notified that he or she may decline to receive medical services by telemedicine and may withdraw from such care at any time.    Notes:       Ghada Manley is a 40 y.o. here for a urogyn follow up.    1)  BM: +IBS-D.  No constipation.  +food triggers (fructose intolerance).  Stress can flare.  Flares make bladder sx worse.  Pretty well controlled on meds. No hematochezia.  Takes fiber daily.  2)  IC:  --when/how Dx: 2007 Dx IBS.  Then had some irritable bladder with 1st child.  Had sx in 2012--saw [facility], started on ? Med, was given sample x 3 days--which helped.  Off market, but AZO can help x a few days by not as much.  2014:  Intermittent yeast infection/UTI.  Saw Rosy and was referred here.  Started probiotic on own, and this has stopped all issues.  Only has some clear, vaginal d/c, which is not bothersome.  2015 was seen by [facility] in [place], was Dx with IC--advised to  avoid dietary irritants.  Was also told to control endometriosis--was placed on lo lo estrin to control heavy, painful menses (bladder issues worse with this).  Takes OCP continuously, which controls menstrual symptoms.  Had flare around Xmas 2015 (stress/anxiety)--took urogesic blue, but made her feel worse.  Then, stopped.  Saw Dr. Levy (URO)--wanted to do cysto/hydro, instillations, CT due to micro hematuria.  Was given VESIcare (helped urgency, osiris if standing).  Stopped x 1 day, sx really worse, so restarted.  VESIcare makes chest itch?    --typical symptoms with flares: U/F, no dysuria/hesitancy.  Slight relief with emptying but short lived.  Constant low grade vaginal/bladder pain.  Dull, constant.  Will have intermittent cramping.  Current frequency Qh.  Without flare: Q 2-3 hours.  Nocturia 0-1x/PM.  Some UUI and slight MIGNON with bending forward.  Does not typically have UI.    --flare freq: last flare Xmas 2016.  Still having.  Usually Q6 months.    --current regimen: Taking 5 mg daily.  Is it working? Improved but not idea--mostly with U/F.    --past attempted meds/Tx: urogesic (caused pain).  AZO dulls mildly.  Cystex helps pain most.  Took amitriptyline for years for IBS--stopped b/c wanted to get off meds.    --anxiety/stress: +.  Bladder/bowel worse with this.  Did therapy in past (talk and visualization)--last was few years ago. Controlled: variable, mostly around holidays.  Good support system.   --seasonal allergies: none  --general pelvic pain/dyspareunia: occasional has cramping that feels like mild menstrual cramps, ?Ghost period symptoms.  Has been avoiding sex: act not painful but irritated afterwards.    --no s/sx POP    Initial exam:  PELVIC:    External genitalia:  Normal Bartholins, Skenes and labia bilaterally.  + suprapubic pressure with palpation  Urethra:  No caruncle, diverticulum or masses.  (--) hypermobility.    Vagina:  Atrophy (--) , no bladder masses or tender, no discharge.     Cervix:  normal appearance  Uterus: normal size, contour, position, consistency, mobility, non-tender  Adnexa: Not palpable.  POP-Q:  Deferred.  No obvious POP present with valsalva.     Past Medical History  Past Medical History:   Diagnosis Date    Menarche     Age of onset 11   IBS    Past Surgical History  Past Surgical History:   Procedure Laterality Date     SECTION      SALPINGECTOMY     Dx LSC: endometriosis, adhesiolysis, ?fulguration  c/s x 2  2009: Ectopic pregnancy + unilateral saplingectomy    Hysterectomy: No    Past Ob History     x 0.  C/s x 2 (1st breech).    Largest infant weight: 7#10oz.      Well-woman:  Last pap: , normal per report ([facility]).  No h/o abnl paps/STDs.   Last mammogram:  normal per report ([facility]).   Colonoscopy: , normal per report (hemorrhoids). For IBS.   DEXA: NA    Issues include:  Patient Active Problem List   Diagnosis    Family history of breast cancer    Interstitial cystitis    Irritable bowel syndrome    Endometriosis    Tinnitus of left ear    Suspected COVID-19 virus infection    Wheezing     History since last visit:     1. IBS  --stable; no major issues    2. IC  --stable; no major issues  --continues amitriptyline 10-20 mg nightly  --continues detrol LA 4 mg    3. Endometriosis  --continue OCP's  --controlling will help bladder issues    4.  MIGNON:  --not really bothersome  --improved with core strengthening    Medications:    Current Outpatient Medications:     albuterol (PROAIR HFA) 90 mcg/actuation inhaler, Inhale 2 puffs into the lungs every 6 (six) hours as needed for Wheezing. Rescue, Disp: 18 g, Rfl: 0    amitriptyline (ELAVIL) 10 MG tablet, TAKE 2 TABLETS EVERY EVENING, Disp: 60 tablet, Rfl: 0    amitriptyline (ELAVIL) 10 MG tablet, Take 1 tablet (10 mg total) by mouth every evening., Disp: 120 tablet, Rfl: 4    LO LOESTRIN FE 1 mg-10 mcg (24)/10 mcg (2) Tab, , Disp: , Rfl:     tolterodine (DETROL LA) 4 MG 24  hr capsule, Take 1 capsule (4 mg total) by mouth once daily., Disp: 90 capsule, Rfl: 6      ROS:  As per HPI.      Exam  There were no vitals taken for this visit.  General: alert and oriented, no acute distress  Remainder of PE deferred due to telemed visit.     Impression  1. Irritable bowel syndrome with both constipation and diarrhea     2. Interstitial cystitis  amitriptyline (ELAVIL) 10 MG tablet    tolterodine (DETROL LA) 4 MG 24 hr capsule    DISCONTINUED: amitriptyline (ELAVIL) 10 MG tablet   3. Family history of breast cancer     4. Endometriosis       We reviewed the above issues and discussed options for short-term versus long-term management of her problems.   Plan:   1. IBS  --continue fiber + probiotic  --continue avoiding dietary triggers  --continue meditation/relaxation exercises    2. IC  --avoid dietary irritants   --could try prelief before any irritants to help reduce effect  --manage stress/ anxiety  --continue detrol LA 4 mg  --continue amitriptyline 10-20 mg daily; may increase shortterm for flares; call us if would like to send to mail order  --consider cystohydrodistension vs. Bladder instillations in not improved  --consider prelief because cystex worked for her  --consider elmiron in the future if needed  --consider PT in the future    3. Endometriosis  --continue OCP's  --controlling will help bladder issues    4. Stress incontinence:  --Empty bladder every 3 hours.  Empty well: wait a minute, lean forward on toilet.    --KEGELS: do 10 in AM and 10 in PM, holding each x 10 seconds.  When you feel urge to go, STOP, KEGEL, and when urge has passed, then go to bathroom.  Consider PT in future.    --do not go overboard with these--watch making pelvic tension worse  --let us know if not helping--can consider PT    5.  Med refills provided.  See GYN yearly for well-woman.  RTC 1 year.      15 minutes were spent in face to face time with this patient  100 % of this time was spent in counseling  and/or coordination of care  ME@  [facility]  Division of Female Pelvic Medicine and Reconstructive Surgery  Department of Obstetrics & Gynecology

## 2020-11-19 ENCOUNTER — PATIENT MESSAGE (OUTPATIENT)
Dept: PRIMARY CARE CLINIC | Facility: CLINIC | Age: 40
End: 2020-11-19

## 2020-11-20 ENCOUNTER — PATIENT MESSAGE (OUTPATIENT)
Dept: PRIMARY CARE CLINIC | Facility: CLINIC | Age: 40
End: 2020-11-20

## 2020-11-20 ENCOUNTER — TELEPHONE (OUTPATIENT)
Dept: PRIMARY CARE CLINIC | Facility: CLINIC | Age: 40
End: 2020-11-20

## 2020-11-20 DIAGNOSIS — Z20.822 SUSPECTED COVID-19 VIRUS INFECTION: Primary | ICD-10-CM

## 2020-11-20 DIAGNOSIS — Z03.818 ENCOUNTER FOR OBSERVATION FOR SUSPECTED EXPOSURE TO OTHER BIOLOGICAL AGENTS RULED OUT: ICD-10-CM

## 2020-11-20 NOTE — TELEPHONE ENCOUNTER
This is a duplicate message. Pt also sent a mychart msg to  and that msg was forwarded to  for review.

## 2020-11-20 NOTE — TELEPHONE ENCOUNTER
----- Message from Kavya Johnson sent at 11/20/2020  1:42 PM CST -----  Contact: [phone]  Patient called, stated that she was told by pcp to contact office to scheduled her covid-19 test.    unable to scheduled test because orders are under the finalized requested. Please call patient and advise. Thank you

## 2020-11-21 ENCOUNTER — PATIENT MESSAGE (OUTPATIENT)
Dept: UROGYNECOLOGY | Facility: CLINIC | Age: 40
End: 2020-11-21

## 2020-11-23 ENCOUNTER — LAB VISIT (OUTPATIENT)
Dept: INTERNAL MEDICINE | Facility: CLINIC | Age: 40
End: 2020-11-23
Payer: COMMERCIAL

## 2020-11-23 DIAGNOSIS — Z20.822 SUSPECTED COVID-19 VIRUS INFECTION: ICD-10-CM

## 2020-11-23 PROCEDURE — U0003 INFECTIOUS AGENT DETECTION BY NUCLEIC ACID (DNA OR RNA); SEVERE ACUTE RESPIRATORY SYNDROME CORONAVIRUS 2 (SARS-COV-2) (CORONAVIRUS DISEASE [COVID-19]), AMPLIFIED PROBE TECHNIQUE, MAKING USE OF HIGH THROUGHPUT TECHNOLOGIES AS DESCRIBED BY CMS-2020-01-R: HCPCS

## 2020-11-24 LAB — SARS-COV-2 RNA RESP QL NAA+PROBE: NOT DETECTED

## 2020-11-24 NOTE — PROGRESS NOTES
I sent pt a my chart message -  I reviewed your COVID -19 test and it was negative  Please note there is a 30% false negative rate and I would still recommend you self isolate as per the CDC GL's for at least 10 days since symptom onset and until fever free without the use of anti-fever medication and symptoms have improved for 24 hrs.     Dr. TORIBIO

## 2021-02-19 ENCOUNTER — PATIENT MESSAGE (OUTPATIENT)
Dept: UROGYNECOLOGY | Facility: CLINIC | Age: 41
End: 2021-02-19

## 2021-03-10 ENCOUNTER — PATIENT OUTREACH (OUTPATIENT)
Dept: ADMINISTRATIVE | Facility: OTHER | Age: 41
End: 2021-03-10

## 2021-03-11 ENCOUNTER — OFFICE VISIT (OUTPATIENT)
Dept: UROGYNECOLOGY | Facility: CLINIC | Age: 41
End: 2021-03-11
Payer: COMMERCIAL

## 2021-03-11 VITALS
HEIGHT: 67 IN | WEIGHT: 135.81 LBS | SYSTOLIC BLOOD PRESSURE: 120 MMHG | DIASTOLIC BLOOD PRESSURE: 70 MMHG | BODY MASS INDEX: 21.31 KG/M2

## 2021-03-11 DIAGNOSIS — Z91.89 AT HIGH RISK FOR BREAST CANCER: ICD-10-CM

## 2021-03-11 DIAGNOSIS — Z12.4 ENCOUNTER FOR SCREENING FOR CERVICAL CANCER: ICD-10-CM

## 2021-03-11 DIAGNOSIS — N30.10 INTERSTITIAL CYSTITIS: ICD-10-CM

## 2021-03-11 DIAGNOSIS — Z01.419 WELL WOMAN EXAM: Primary | ICD-10-CM

## 2021-03-11 DIAGNOSIS — Z80.3 FAMILY HISTORY OF BREAST CANCER: ICD-10-CM

## 2021-03-11 PROCEDURE — 1126F PR PAIN SEVERITY QUANTIFIED, NO PAIN PRESENT: ICD-10-PCS | Mod: S$GLB,,, | Performed by: NURSE PRACTITIONER

## 2021-03-11 PROCEDURE — 88175 CYTOPATH C/V AUTO FLUID REDO: CPT | Performed by: NURSE PRACTITIONER

## 2021-03-11 PROCEDURE — 99999 PR PBB SHADOW E&M-EST. PATIENT-LVL III: CPT | Mod: PBBFAC,,, | Performed by: NURSE PRACTITIONER

## 2021-03-11 PROCEDURE — 87624 HPV HI-RISK TYP POOLED RSLT: CPT | Performed by: NURSE PRACTITIONER

## 2021-03-11 PROCEDURE — 99999 PR PBB SHADOW E&M-EST. PATIENT-LVL III: ICD-10-PCS | Mod: PBBFAC,,, | Performed by: NURSE PRACTITIONER

## 2021-03-11 PROCEDURE — 3008F PR BODY MASS INDEX (BMI) DOCUMENTED: ICD-10-PCS | Mod: CPTII,S$GLB,, | Performed by: NURSE PRACTITIONER

## 2021-03-11 PROCEDURE — 99396 PR PREVENTIVE VISIT,EST,40-64: ICD-10-PCS | Mod: S$GLB,,, | Performed by: NURSE PRACTITIONER

## 2021-03-11 PROCEDURE — 1126F AMNT PAIN NOTED NONE PRSNT: CPT | Mod: S$GLB,,, | Performed by: NURSE PRACTITIONER

## 2021-03-11 PROCEDURE — 3008F BODY MASS INDEX DOCD: CPT | Mod: CPTII,S$GLB,, | Performed by: NURSE PRACTITIONER

## 2021-03-11 PROCEDURE — 99396 PREV VISIT EST AGE 40-64: CPT | Mod: S$GLB,,, | Performed by: NURSE PRACTITIONER

## 2021-03-11 RX ORDER — NORETHINDRONE ACETATE AND ETHINYL ESTRADIOL, ETHINYL ESTRADIOL AND FERROUS FUMARATE 1MG-10(24)
1 KIT ORAL DAILY
Qty: 84 TABLET | Refills: 4 | Status: SHIPPED | OUTPATIENT
Start: 2021-03-11 | End: 2021-07-09

## 2021-03-15 ENCOUNTER — IMMUNIZATION (OUTPATIENT)
Dept: FAMILY MEDICINE | Facility: CLINIC | Age: 41
End: 2021-03-15
Payer: COMMERCIAL

## 2021-03-15 DIAGNOSIS — Z23 NEED FOR VACCINATION: Primary | ICD-10-CM

## 2021-03-15 PROCEDURE — 91300 COVID-19, MRNA, LNP-S, PF, 30 MCG/0.3 ML DOSE VACCINE: CPT | Mod: PBBFAC | Performed by: FAMILY MEDICINE

## 2021-03-19 ENCOUNTER — PATIENT MESSAGE (OUTPATIENT)
Dept: UROGYNECOLOGY | Facility: CLINIC | Age: 41
End: 2021-03-19

## 2021-03-19 LAB
CLINICAL INFO: NORMAL
CYTO CVX: NORMAL
CYTOLOGIST CVX/VAG CYTO: NORMAL
CYTOLOGY CMNT CVX/VAG CYTO-IMP: NORMAL
CYTOLOGY PAP THIN PREP EXPLANATION: NORMAL
DATE OF PREVIOUS PAP: NORMAL
DATE PREVIOUS BX: NO
HPV I/H RISK 4 DNA CVX QL NAA+PROBE: NOT DETECTED
LMP START DATE: NORMAL
SPECIMEN SOURCE CVX/VAG CYTO: NORMAL
STAT OF ADQ CVX/VAG CYTO-IMP: NORMAL

## 2021-04-06 ENCOUNTER — IMMUNIZATION (OUTPATIENT)
Dept: FAMILY MEDICINE | Facility: CLINIC | Age: 41
End: 2021-04-06
Payer: COMMERCIAL

## 2021-04-06 DIAGNOSIS — Z23 NEED FOR VACCINATION: Primary | ICD-10-CM

## 2021-04-06 PROCEDURE — 91300 COVID-19, MRNA, LNP-S, PF, 30 MCG/0.3 ML DOSE VACCINE: CPT | Mod: S$GLB,,, | Performed by: FAMILY MEDICINE

## 2021-04-06 PROCEDURE — 0002A COVID-19, MRNA, LNP-S, PF, 30 MCG/0.3 ML DOSE VACCINE: ICD-10-PCS | Mod: CV19,S$GLB,, | Performed by: FAMILY MEDICINE

## 2021-04-06 PROCEDURE — 91300 COVID-19, MRNA, LNP-S, PF, 30 MCG/0.3 ML DOSE VACCINE: ICD-10-PCS | Mod: S$GLB,,, | Performed by: FAMILY MEDICINE

## 2021-04-06 PROCEDURE — 0002A COVID-19, MRNA, LNP-S, PF, 30 MCG/0.3 ML DOSE VACCINE: CPT | Mod: CV19,S$GLB,, | Performed by: FAMILY MEDICINE

## 2021-05-07 ENCOUNTER — PATIENT MESSAGE (OUTPATIENT)
Dept: UROGYNECOLOGY | Facility: CLINIC | Age: 41
End: 2021-05-07

## 2021-05-07 RX ORDER — NORETHINDRONE ACETATE AND ETHINYL ESTRADIOL, ETHINYL ESTRADIOL AND FERROUS FUMARATE 1MG-10(24)
1 KIT ORAL DAILY
Qty: 84 TABLET | Refills: 3 | Status: SHIPPED | OUTPATIENT
Start: 2021-05-07 | End: 2022-05-12 | Stop reason: SDUPTHER

## 2021-05-10 ENCOUNTER — TELEPHONE (OUTPATIENT)
Dept: UROGYNECOLOGY | Facility: CLINIC | Age: 41
End: 2021-05-10

## 2021-05-11 ENCOUNTER — TELEPHONE (OUTPATIENT)
Dept: UROGYNECOLOGY | Facility: CLINIC | Age: 41
End: 2021-05-11

## 2021-06-01 ENCOUNTER — PATIENT MESSAGE (OUTPATIENT)
Dept: UROGYNECOLOGY | Facility: CLINIC | Age: 41
End: 2021-06-01

## 2021-06-01 ENCOUNTER — TELEPHONE (OUTPATIENT)
Dept: UROGYNECOLOGY | Facility: CLINIC | Age: 41
End: 2021-06-01

## 2021-06-11 ENCOUNTER — PATIENT MESSAGE (OUTPATIENT)
Dept: UROGYNECOLOGY | Facility: CLINIC | Age: 41
End: 2021-06-11

## 2021-06-11 ENCOUNTER — TELEPHONE (OUTPATIENT)
Dept: UROGYNECOLOGY | Facility: CLINIC | Age: 41
End: 2021-06-11

## 2021-07-06 ENCOUNTER — PATIENT MESSAGE (OUTPATIENT)
Dept: ADMINISTRATIVE | Facility: HOSPITAL | Age: 41
End: 2021-07-06

## 2021-07-06 ENCOUNTER — PATIENT MESSAGE (OUTPATIENT)
Dept: UROGYNECOLOGY | Facility: CLINIC | Age: 41
End: 2021-07-06

## 2021-07-06 ENCOUNTER — LAB VISIT (OUTPATIENT)
Dept: LAB | Facility: HOSPITAL | Age: 41
End: 2021-07-06
Attending: NURSE PRACTITIONER
Payer: COMMERCIAL

## 2021-07-06 DIAGNOSIS — R30.0 DYSURIA: Primary | ICD-10-CM

## 2021-07-06 DIAGNOSIS — R30.0 DYSURIA: ICD-10-CM

## 2021-07-06 LAB
BILIRUB UR QL STRIP: NEGATIVE
CLARITY UR REFRACT.AUTO: CLEAR
COLOR UR AUTO: NORMAL
GLUCOSE UR QL STRIP: NEGATIVE
HGB UR QL STRIP: NEGATIVE
KETONES UR QL STRIP: NEGATIVE
LEUKOCYTE ESTERASE UR QL STRIP: NEGATIVE
NITRITE UR QL STRIP: NEGATIVE
PH UR STRIP: 6 [PH] (ref 5–8)
PROT UR QL STRIP: NEGATIVE
SP GR UR STRIP: 1 (ref 1–1.03)
URN SPEC COLLECT METH UR: NORMAL

## 2021-07-06 PROCEDURE — 87086 URINE CULTURE/COLONY COUNT: CPT | Performed by: NURSE PRACTITIONER

## 2021-07-06 PROCEDURE — 81003 URINALYSIS AUTO W/O SCOPE: CPT | Performed by: NURSE PRACTITIONER

## 2021-07-07 LAB — BACTERIA UR CULT: NORMAL

## 2021-07-08 ENCOUNTER — PATIENT MESSAGE (OUTPATIENT)
Dept: PRIMARY CARE CLINIC | Facility: CLINIC | Age: 41
End: 2021-07-08

## 2021-07-09 ENCOUNTER — OFFICE VISIT (OUTPATIENT)
Dept: PRIMARY CARE CLINIC | Facility: CLINIC | Age: 41
End: 2021-07-09
Payer: COMMERCIAL

## 2021-07-09 DIAGNOSIS — Z00.00 NORMAL PHYSICAL EXAM, ROUTINE: ICD-10-CM

## 2021-07-09 DIAGNOSIS — M54.41 ACUTE RIGHT-SIDED LOW BACK PAIN WITH RIGHT-SIDED SCIATICA: Primary | ICD-10-CM

## 2021-07-09 DIAGNOSIS — Z13.220 SCREENING FOR LIPOID DISORDERS: ICD-10-CM

## 2021-07-09 PROCEDURE — 99213 PR OFFICE/OUTPT VISIT, EST, LEVL III, 20-29 MIN: ICD-10-PCS | Mod: 95,,, | Performed by: INTERNAL MEDICINE

## 2021-07-09 PROCEDURE — 99213 OFFICE O/P EST LOW 20 MIN: CPT | Mod: 95,,, | Performed by: INTERNAL MEDICINE

## 2021-07-09 RX ORDER — CYCLOBENZAPRINE HCL 5 MG
5 TABLET ORAL 3 TIMES DAILY PRN
Qty: 30 TABLET | Refills: 0 | Status: SHIPPED | OUTPATIENT
Start: 2021-07-09 | End: 2021-07-19

## 2021-08-25 ENCOUNTER — LAB VISIT (OUTPATIENT)
Dept: LAB | Facility: HOSPITAL | Age: 41
End: 2021-08-25
Attending: INTERNAL MEDICINE
Payer: COMMERCIAL

## 2021-08-25 DIAGNOSIS — Z13.220 SCREENING FOR LIPOID DISORDERS: ICD-10-CM

## 2021-08-25 DIAGNOSIS — Z00.00 NORMAL PHYSICAL EXAM, ROUTINE: ICD-10-CM

## 2021-08-25 LAB
ALBUMIN SERPL BCP-MCNC: 3.7 G/DL (ref 3.5–5.2)
ALP SERPL-CCNC: 39 U/L (ref 55–135)
ALT SERPL W/O P-5'-P-CCNC: 20 U/L (ref 10–44)
ANION GAP SERPL CALC-SCNC: 10 MMOL/L (ref 8–16)
AST SERPL-CCNC: 25 U/L (ref 10–40)
BASOPHILS # BLD AUTO: 0.05 K/UL (ref 0–0.2)
BASOPHILS NFR BLD: 1 % (ref 0–1.9)
BILIRUB SERPL-MCNC: 0.7 MG/DL (ref 0.1–1)
BUN SERPL-MCNC: 7 MG/DL (ref 6–20)
CALCIUM SERPL-MCNC: 9 MG/DL (ref 8.7–10.5)
CHLORIDE SERPL-SCNC: 106 MMOL/L (ref 95–110)
CHOLEST SERPL-MCNC: 148 MG/DL (ref 120–199)
CHOLEST/HDLC SERPL: 2.1 {RATIO} (ref 2–5)
CO2 SERPL-SCNC: 20 MMOL/L (ref 23–29)
CREAT SERPL-MCNC: 1 MG/DL (ref 0.5–1.4)
DIFFERENTIAL METHOD: ABNORMAL
EOSINOPHIL # BLD AUTO: 0.1 K/UL (ref 0–0.5)
EOSINOPHIL NFR BLD: 1.3 % (ref 0–8)
ERYTHROCYTE [DISTWIDTH] IN BLOOD BY AUTOMATED COUNT: 11.9 % (ref 11.5–14.5)
EST. GFR  (AFRICAN AMERICAN): >60 ML/MIN/1.73 M^2
EST. GFR  (NON AFRICAN AMERICAN): >60 ML/MIN/1.73 M^2
GLUCOSE SERPL-MCNC: 78 MG/DL (ref 70–110)
HCT VFR BLD AUTO: 38.5 % (ref 37–48.5)
HDLC SERPL-MCNC: 70 MG/DL (ref 40–75)
HDLC SERPL: 47.3 % (ref 20–50)
HGB BLD-MCNC: 13.1 G/DL (ref 12–16)
IMM GRANULOCYTES # BLD AUTO: 0.01 K/UL (ref 0–0.04)
IMM GRANULOCYTES NFR BLD AUTO: 0.2 % (ref 0–0.5)
LDLC SERPL CALC-MCNC: 63.4 MG/DL (ref 63–159)
LYMPHOCYTES # BLD AUTO: 1.2 K/UL (ref 1–4.8)
LYMPHOCYTES NFR BLD: 22.1 % (ref 18–48)
MCH RBC QN AUTO: 33.5 PG (ref 27–31)
MCHC RBC AUTO-ENTMCNC: 34 G/DL (ref 32–36)
MCV RBC AUTO: 99 FL (ref 82–98)
MONOCYTES # BLD AUTO: 0.4 K/UL (ref 0.3–1)
MONOCYTES NFR BLD: 6.9 % (ref 4–15)
NEUTROPHILS # BLD AUTO: 3.6 K/UL (ref 1.8–7.7)
NEUTROPHILS NFR BLD: 68.5 % (ref 38–73)
NONHDLC SERPL-MCNC: 78 MG/DL
NRBC BLD-RTO: 0 /100 WBC
PLATELET # BLD AUTO: 226 K/UL (ref 150–450)
PMV BLD AUTO: 12.2 FL (ref 9.2–12.9)
POTASSIUM SERPL-SCNC: 4.2 MMOL/L (ref 3.5–5.1)
PROT SERPL-MCNC: 6.6 G/DL (ref 6–8.4)
RBC # BLD AUTO: 3.91 M/UL (ref 4–5.4)
SODIUM SERPL-SCNC: 136 MMOL/L (ref 136–145)
T4 FREE SERPL-MCNC: 0.89 NG/DL (ref 0.71–1.51)
TRIGL SERPL-MCNC: 73 MG/DL (ref 30–150)
TSH SERPL DL<=0.005 MIU/L-ACNC: 2.05 UIU/ML (ref 0.4–4)
WBC # BLD AUTO: 5.24 K/UL (ref 3.9–12.7)

## 2021-08-25 PROCEDURE — 85025 COMPLETE CBC W/AUTO DIFF WBC: CPT | Performed by: INTERNAL MEDICINE

## 2021-08-25 PROCEDURE — 84443 ASSAY THYROID STIM HORMONE: CPT | Performed by: INTERNAL MEDICINE

## 2021-08-25 PROCEDURE — 80061 LIPID PANEL: CPT | Performed by: INTERNAL MEDICINE

## 2021-08-25 PROCEDURE — 84439 ASSAY OF FREE THYROXINE: CPT | Performed by: INTERNAL MEDICINE

## 2021-08-25 PROCEDURE — 80053 COMPREHEN METABOLIC PANEL: CPT | Performed by: INTERNAL MEDICINE

## 2021-08-25 PROCEDURE — 36415 COLL VENOUS BLD VENIPUNCTURE: CPT | Mod: PN | Performed by: INTERNAL MEDICINE

## 2021-08-26 ENCOUNTER — PATIENT MESSAGE (OUTPATIENT)
Dept: PRIMARY CARE CLINIC | Facility: CLINIC | Age: 41
End: 2021-08-26

## 2021-08-26 ENCOUNTER — TELEPHONE (OUTPATIENT)
Dept: PRIMARY CARE CLINIC | Facility: CLINIC | Age: 41
End: 2021-08-26

## 2021-08-26 DIAGNOSIS — E87.8 LOW BICARBONATE: Primary | ICD-10-CM

## 2021-09-20 ENCOUNTER — OFFICE VISIT (OUTPATIENT)
Dept: PRIMARY CARE CLINIC | Facility: CLINIC | Age: 41
End: 2021-09-20
Payer: COMMERCIAL

## 2021-09-20 VITALS
TEMPERATURE: 98 F | RESPIRATION RATE: 12 BRPM | OXYGEN SATURATION: 99 % | HEIGHT: 67 IN | DIASTOLIC BLOOD PRESSURE: 76 MMHG | HEART RATE: 81 BPM | WEIGHT: 134.5 LBS | BODY MASS INDEX: 21.11 KG/M2 | SYSTOLIC BLOOD PRESSURE: 112 MMHG

## 2021-09-20 DIAGNOSIS — Z12.31 SCREENING MAMMOGRAM, ENCOUNTER FOR: ICD-10-CM

## 2021-09-20 DIAGNOSIS — K58.2 IRRITABLE BOWEL SYNDROME WITH BOTH CONSTIPATION AND DIARRHEA: ICD-10-CM

## 2021-09-20 DIAGNOSIS — Z00.00 NORMAL PHYSICAL EXAM, ROUTINE: Primary | ICD-10-CM

## 2021-09-20 PROBLEM — G43.109 OCULAR MIGRAINE: Status: ACTIVE | Noted: 2021-09-20

## 2021-09-20 PROCEDURE — 3008F PR BODY MASS INDEX (BMI) DOCUMENTED: ICD-10-PCS | Mod: CPTII,S$GLB,, | Performed by: INTERNAL MEDICINE

## 2021-09-20 PROCEDURE — 3078F DIAST BP <80 MM HG: CPT | Mod: CPTII,S$GLB,, | Performed by: INTERNAL MEDICINE

## 2021-09-20 PROCEDURE — 3074F PR MOST RECENT SYSTOLIC BLOOD PRESSURE < 130 MM HG: ICD-10-PCS | Mod: CPTII,S$GLB,, | Performed by: INTERNAL MEDICINE

## 2021-09-20 PROCEDURE — 99396 PREV VISIT EST AGE 40-64: CPT | Mod: S$GLB,,, | Performed by: INTERNAL MEDICINE

## 2021-09-20 PROCEDURE — 99999 PR PBB SHADOW E&M-EST. PATIENT-LVL IV: CPT | Mod: PBBFAC,,, | Performed by: INTERNAL MEDICINE

## 2021-09-20 PROCEDURE — 1159F MED LIST DOCD IN RCRD: CPT | Mod: CPTII,S$GLB,, | Performed by: INTERNAL MEDICINE

## 2021-09-20 PROCEDURE — 1160F RVW MEDS BY RX/DR IN RCRD: CPT | Mod: CPTII,S$GLB,, | Performed by: INTERNAL MEDICINE

## 2021-09-20 PROCEDURE — 99396 PR PREVENTIVE VISIT,EST,40-64: ICD-10-PCS | Mod: S$GLB,,, | Performed by: INTERNAL MEDICINE

## 2021-09-20 PROCEDURE — 3074F SYST BP LT 130 MM HG: CPT | Mod: CPTII,S$GLB,, | Performed by: INTERNAL MEDICINE

## 2021-09-20 PROCEDURE — 3008F BODY MASS INDEX DOCD: CPT | Mod: CPTII,S$GLB,, | Performed by: INTERNAL MEDICINE

## 2021-09-20 PROCEDURE — 99999 PR PBB SHADOW E&M-EST. PATIENT-LVL IV: ICD-10-PCS | Mod: PBBFAC,,, | Performed by: INTERNAL MEDICINE

## 2021-09-20 PROCEDURE — 3078F PR MOST RECENT DIASTOLIC BLOOD PRESSURE < 80 MM HG: ICD-10-PCS | Mod: CPTII,S$GLB,, | Performed by: INTERNAL MEDICINE

## 2021-09-20 PROCEDURE — 1159F PR MEDICATION LIST DOCUMENTED IN MEDICAL RECORD: ICD-10-PCS | Mod: CPTII,S$GLB,, | Performed by: INTERNAL MEDICINE

## 2021-09-20 PROCEDURE — 1160F PR REVIEW ALL MEDS BY PRESCRIBER/CLIN PHARMACIST DOCUMENTED: ICD-10-PCS | Mod: CPTII,S$GLB,, | Performed by: INTERNAL MEDICINE

## 2021-09-28 ENCOUNTER — LAB VISIT (OUTPATIENT)
Dept: LAB | Facility: HOSPITAL | Age: 41
End: 2021-09-28
Attending: INTERNAL MEDICINE
Payer: COMMERCIAL

## 2021-09-28 DIAGNOSIS — E87.8 LOW BICARBONATE: ICD-10-CM

## 2021-09-28 LAB
ANION GAP SERPL CALC-SCNC: 10 MMOL/L (ref 8–16)
BUN SERPL-MCNC: 8 MG/DL (ref 6–20)
CALCIUM SERPL-MCNC: 9.3 MG/DL (ref 8.7–10.5)
CHLORIDE SERPL-SCNC: 106 MMOL/L (ref 95–110)
CO2 SERPL-SCNC: 23 MMOL/L (ref 23–29)
CREAT SERPL-MCNC: 1 MG/DL (ref 0.5–1.4)
EST. GFR  (AFRICAN AMERICAN): >60 ML/MIN/1.73 M^2
EST. GFR  (NON AFRICAN AMERICAN): >60 ML/MIN/1.73 M^2
GLUCOSE SERPL-MCNC: 77 MG/DL (ref 70–110)
POTASSIUM SERPL-SCNC: 3.9 MMOL/L (ref 3.5–5.1)
SODIUM SERPL-SCNC: 139 MMOL/L (ref 136–145)

## 2021-09-28 PROCEDURE — 80048 BASIC METABOLIC PNL TOTAL CA: CPT | Performed by: INTERNAL MEDICINE

## 2021-09-28 PROCEDURE — 36415 COLL VENOUS BLD VENIPUNCTURE: CPT | Mod: PN | Performed by: INTERNAL MEDICINE

## 2021-10-11 PROBLEM — Z00.00 NORMAL PHYSICAL EXAM, ROUTINE: Status: RESOLVED | Noted: 2021-07-09 | Resolved: 2021-10-11

## 2021-10-13 ENCOUNTER — HOSPITAL ENCOUNTER (OUTPATIENT)
Dept: RADIOLOGY | Facility: HOSPITAL | Age: 41
Discharge: HOME | End: 2021-10-13
Attending: INTERNAL MEDICINE
Payer: COMMERCIAL

## 2021-10-13 ENCOUNTER — OFFICE VISIT (OUTPATIENT)
Dept: HEMATOLOGY/ONCOLOGY | Facility: CLINIC | Age: 41
End: 2021-10-13
Payer: COMMERCIAL

## 2021-10-13 VITALS
TEMPERATURE: 98 F | HEIGHT: 67 IN | SYSTOLIC BLOOD PRESSURE: 123 MMHG | OXYGEN SATURATION: 99 % | HEART RATE: 92 BPM | BODY MASS INDEX: 21.03 KG/M2 | HEIGHT: 67 IN | WEIGHT: 130.81 LBS | BODY MASS INDEX: 20.53 KG/M2 | WEIGHT: 134 LBS | DIASTOLIC BLOOD PRESSURE: 71 MMHG | RESPIRATION RATE: 16 BRPM

## 2021-10-13 DIAGNOSIS — Z91.89 AT HIGH RISK FOR BREAST CANCER: ICD-10-CM

## 2021-10-13 DIAGNOSIS — Z12.31 SCREENING MAMMOGRAM, ENCOUNTER FOR: ICD-10-CM

## 2021-10-13 DIAGNOSIS — Z80.3 FAMILY HISTORY OF BREAST CANCER: Primary | ICD-10-CM

## 2021-10-13 PROCEDURE — 77063 MAMMO DIGITAL SCREENING BILAT WITH TOMO: ICD-10-PCS | Mod: 26,,, | Performed by: RADIOLOGY

## 2021-10-13 PROCEDURE — 3074F SYST BP LT 130 MM HG: CPT | Mod: CPTII,S$GLB,, | Performed by: NURSE PRACTITIONER

## 2021-10-13 PROCEDURE — 3008F PR BODY MASS INDEX (BMI) DOCUMENTED: ICD-10-PCS | Mod: CPTII,S$GLB,, | Performed by: NURSE PRACTITIONER

## 2021-10-13 PROCEDURE — 1160F RVW MEDS BY RX/DR IN RCRD: CPT | Mod: CPTII,S$GLB,, | Performed by: NURSE PRACTITIONER

## 2021-10-13 PROCEDURE — 3074F PR MOST RECENT SYSTOLIC BLOOD PRESSURE < 130 MM HG: ICD-10-PCS | Mod: CPTII,S$GLB,, | Performed by: NURSE PRACTITIONER

## 2021-10-13 PROCEDURE — 77067 SCR MAMMO BI INCL CAD: CPT | Mod: TC

## 2021-10-13 PROCEDURE — 1160F PR REVIEW ALL MEDS BY PRESCRIBER/CLIN PHARMACIST DOCUMENTED: ICD-10-PCS | Mod: CPTII,S$GLB,, | Performed by: NURSE PRACTITIONER

## 2021-10-13 PROCEDURE — 77067 SCR MAMMO BI INCL CAD: CPT | Mod: 26,,, | Performed by: RADIOLOGY

## 2021-10-13 PROCEDURE — 99204 PR OFFICE/OUTPT VISIT, NEW, LEVL IV, 45-59 MIN: ICD-10-PCS | Mod: S$GLB,,, | Performed by: NURSE PRACTITIONER

## 2021-10-13 PROCEDURE — 3078F DIAST BP <80 MM HG: CPT | Mod: CPTII,S$GLB,, | Performed by: NURSE PRACTITIONER

## 2021-10-13 PROCEDURE — 99999 PR PBB SHADOW E&M-EST. PATIENT-LVL III: ICD-10-PCS | Mod: PBBFAC,,, | Performed by: NURSE PRACTITIONER

## 2021-10-13 PROCEDURE — 77067 MAMMO DIGITAL SCREENING BILAT WITH TOMO: ICD-10-PCS | Mod: 26,,, | Performed by: RADIOLOGY

## 2021-10-13 PROCEDURE — 3008F BODY MASS INDEX DOCD: CPT | Mod: CPTII,S$GLB,, | Performed by: NURSE PRACTITIONER

## 2021-10-13 PROCEDURE — 1159F MED LIST DOCD IN RCRD: CPT | Mod: CPTII,S$GLB,, | Performed by: NURSE PRACTITIONER

## 2021-10-13 PROCEDURE — 99999 PR PBB SHADOW E&M-EST. PATIENT-LVL III: CPT | Mod: PBBFAC,,, | Performed by: NURSE PRACTITIONER

## 2021-10-13 PROCEDURE — 3078F PR MOST RECENT DIASTOLIC BLOOD PRESSURE < 80 MM HG: ICD-10-PCS | Mod: CPTII,S$GLB,, | Performed by: NURSE PRACTITIONER

## 2021-10-13 PROCEDURE — 77063 BREAST TOMOSYNTHESIS BI: CPT | Mod: 26,,, | Performed by: RADIOLOGY

## 2021-10-13 PROCEDURE — 99204 OFFICE O/P NEW MOD 45 MIN: CPT | Mod: S$GLB,,, | Performed by: NURSE PRACTITIONER

## 2021-10-13 PROCEDURE — 1159F PR MEDICATION LIST DOCUMENTED IN MEDICAL RECORD: ICD-10-PCS | Mod: CPTII,S$GLB,, | Performed by: NURSE PRACTITIONER

## 2021-12-15 ENCOUNTER — PATIENT MESSAGE (OUTPATIENT)
Dept: UROGYNECOLOGY | Facility: CLINIC | Age: 41
End: 2021-12-15
Payer: COMMERCIAL

## 2021-12-15 ENCOUNTER — PATIENT MESSAGE (OUTPATIENT)
Dept: PRIMARY CARE CLINIC | Facility: CLINIC | Age: 41
End: 2021-12-15
Payer: COMMERCIAL

## 2021-12-15 DIAGNOSIS — N30.10 INTERSTITIAL CYSTITIS: ICD-10-CM

## 2021-12-15 RX ORDER — TOLTERODINE 4 MG/1
4 CAPSULE, EXTENDED RELEASE ORAL DAILY
Qty: 90 CAPSULE | Refills: 2 | Status: SHIPPED | OUTPATIENT
Start: 2021-12-15 | End: 2022-05-20 | Stop reason: SDUPTHER

## 2021-12-15 RX ORDER — AMITRIPTYLINE HYDROCHLORIDE 10 MG/1
10 TABLET, FILM COATED ORAL NIGHTLY
Qty: 90 TABLET | Refills: 2 | Status: SHIPPED | OUTPATIENT
Start: 2021-12-15 | End: 2022-04-26 | Stop reason: SDUPTHER

## 2022-04-26 ENCOUNTER — PATIENT MESSAGE (OUTPATIENT)
Dept: UROGYNECOLOGY | Facility: CLINIC | Age: 42
End: 2022-04-26
Payer: COMMERCIAL

## 2022-04-26 RX ORDER — AMITRIPTYLINE HYDROCHLORIDE 10 MG/1
10 TABLET, FILM COATED ORAL NIGHTLY
Qty: 90 TABLET | Refills: 0 | Status: SHIPPED | OUTPATIENT
Start: 2022-04-26 | End: 2022-05-20 | Stop reason: SDUPTHER

## 2022-04-27 ENCOUNTER — HOSPITAL ENCOUNTER (OUTPATIENT)
Dept: RADIOLOGY | Facility: HOSPITAL | Age: 42
Discharge: HOME | End: 2022-04-27
Attending: NURSE PRACTITIONER
Payer: COMMERCIAL

## 2022-04-27 DIAGNOSIS — Z91.89 AT HIGH RISK FOR BREAST CANCER: ICD-10-CM

## 2022-04-27 DIAGNOSIS — Z80.3 FAMILY HISTORY OF BREAST CANCER: ICD-10-CM

## 2022-04-27 PROCEDURE — 77049 MRI BREAST C-+ W/CAD BI: CPT | Mod: 26,,, | Performed by: RADIOLOGY

## 2022-04-27 PROCEDURE — 25500020 PHARM REV CODE 255: Performed by: NURSE PRACTITIONER

## 2022-04-27 PROCEDURE — A9577 INJ MULTIHANCE: HCPCS | Performed by: NURSE PRACTITIONER

## 2022-04-27 PROCEDURE — 77049 MRI BREAST W/WO CONTRAST, W/CAD, BILATERAL: ICD-10-PCS | Mod: 26,,, | Performed by: RADIOLOGY

## 2022-04-27 PROCEDURE — 77049 MRI BREAST C-+ W/CAD BI: CPT | Mod: TC

## 2022-04-27 RX ADMIN — GADOBENATE DIMEGLUMINE 13 ML: 529 INJECTION, SOLUTION INTRAVENOUS at 12:04

## 2022-05-11 ENCOUNTER — PATIENT MESSAGE (OUTPATIENT)
Dept: UROGYNECOLOGY | Facility: CLINIC | Age: 42
End: 2022-05-11
Payer: COMMERCIAL

## 2022-05-12 ENCOUNTER — TELEPHONE (OUTPATIENT)
Dept: UROGYNECOLOGY | Facility: CLINIC | Age: 42
End: 2022-05-12
Payer: COMMERCIAL

## 2022-05-12 RX ORDER — NORETHINDRONE ACETATE AND ETHINYL ESTRADIOL, ETHINYL ESTRADIOL AND FERROUS FUMARATE 1MG-10(24)
1 KIT ORAL DAILY
Qty: 28 TABLET | Refills: 11 | Status: SHIPPED | OUTPATIENT
Start: 2022-05-12 | End: 2022-05-20 | Stop reason: SDUPTHER

## 2022-05-12 NOTE — TELEPHONE ENCOUNTER
----- Message from Laura Mehta MA sent at 5/11/2022  8:56 AM CDT -----  Regarding: PA  [facility] [phone] to check status of PA for [place] [facility]

## 2022-05-12 NOTE — TELEPHONE ENCOUNTER
Spoke to [facility] [phone] in which he stated they are no longer covering 90 day supply it's only 30 day supply.   Portal message sent to pt as well.

## 2022-05-20 ENCOUNTER — OFFICE VISIT (OUTPATIENT)
Dept: UROGYNECOLOGY | Facility: CLINIC | Age: 42
End: 2022-05-20
Payer: COMMERCIAL

## 2022-05-20 VITALS
WEIGHT: 131.38 LBS | HEIGHT: 67 IN | SYSTOLIC BLOOD PRESSURE: 112 MMHG | BODY MASS INDEX: 20.62 KG/M2 | DIASTOLIC BLOOD PRESSURE: 80 MMHG

## 2022-05-20 DIAGNOSIS — Z01.419 WELL WOMAN EXAM: Primary | ICD-10-CM

## 2022-05-20 DIAGNOSIS — Z91.89 AT HIGH RISK FOR BREAST CANCER: ICD-10-CM

## 2022-05-20 DIAGNOSIS — N80.9 ENDOMETRIOSIS: ICD-10-CM

## 2022-05-20 DIAGNOSIS — N30.10 INTERSTITIAL CYSTITIS: ICD-10-CM

## 2022-05-20 PROCEDURE — 99396 PR PREVENTIVE VISIT,EST,40-64: ICD-10-PCS | Mod: S$GLB,,, | Performed by: NURSE PRACTITIONER

## 2022-05-20 PROCEDURE — 1160F PR REVIEW ALL MEDS BY PRESCRIBER/CLIN PHARMACIST DOCUMENTED: ICD-10-PCS | Mod: CPTII,S$GLB,, | Performed by: NURSE PRACTITIONER

## 2022-05-20 PROCEDURE — 3008F PR BODY MASS INDEX (BMI) DOCUMENTED: ICD-10-PCS | Mod: CPTII,S$GLB,, | Performed by: NURSE PRACTITIONER

## 2022-05-20 PROCEDURE — 99999 PR PBB SHADOW E&M-EST. PATIENT-LVL III: CPT | Mod: PBBFAC,,, | Performed by: NURSE PRACTITIONER

## 2022-05-20 PROCEDURE — 1159F PR MEDICATION LIST DOCUMENTED IN MEDICAL RECORD: ICD-10-PCS | Mod: CPTII,S$GLB,, | Performed by: NURSE PRACTITIONER

## 2022-05-20 PROCEDURE — 3008F BODY MASS INDEX DOCD: CPT | Mod: CPTII,S$GLB,, | Performed by: NURSE PRACTITIONER

## 2022-05-20 PROCEDURE — 1159F MED LIST DOCD IN RCRD: CPT | Mod: CPTII,S$GLB,, | Performed by: NURSE PRACTITIONER

## 2022-05-20 PROCEDURE — 3074F SYST BP LT 130 MM HG: CPT | Mod: CPTII,S$GLB,, | Performed by: NURSE PRACTITIONER

## 2022-05-20 PROCEDURE — 3074F PR MOST RECENT SYSTOLIC BLOOD PRESSURE < 130 MM HG: ICD-10-PCS | Mod: CPTII,S$GLB,, | Performed by: NURSE PRACTITIONER

## 2022-05-20 PROCEDURE — 99396 PREV VISIT EST AGE 40-64: CPT | Mod: S$GLB,,, | Performed by: NURSE PRACTITIONER

## 2022-05-20 PROCEDURE — 3079F DIAST BP 80-89 MM HG: CPT | Mod: CPTII,S$GLB,, | Performed by: NURSE PRACTITIONER

## 2022-05-20 PROCEDURE — 3079F PR MOST RECENT DIASTOLIC BLOOD PRESSURE 80-89 MM HG: ICD-10-PCS | Mod: CPTII,S$GLB,, | Performed by: NURSE PRACTITIONER

## 2022-05-20 PROCEDURE — 1160F RVW MEDS BY RX/DR IN RCRD: CPT | Mod: CPTII,S$GLB,, | Performed by: NURSE PRACTITIONER

## 2022-05-20 PROCEDURE — 99999 PR PBB SHADOW E&M-EST. PATIENT-LVL III: ICD-10-PCS | Mod: PBBFAC,,, | Performed by: NURSE PRACTITIONER

## 2022-05-20 RX ORDER — TOLTERODINE 4 MG/1
4 CAPSULE, EXTENDED RELEASE ORAL DAILY
Qty: 90 CAPSULE | Refills: 3 | Status: SHIPPED | OUTPATIENT
Start: 2022-05-20 | End: 2023-06-12

## 2022-05-20 RX ORDER — NORETHINDRONE ACETATE AND ETHINYL ESTRADIOL, ETHINYL ESTRADIOL AND FERROUS FUMARATE 1MG-10(24)
1 KIT ORAL DAILY
Qty: 84 TABLET | Refills: 3 | Status: SHIPPED | OUTPATIENT
Start: 2022-05-20 | End: 2022-05-20 | Stop reason: SDUPTHER

## 2022-05-20 RX ORDER — NORETHINDRONE ACETATE AND ETHINYL ESTRADIOL, ETHINYL ESTRADIOL AND FERROUS FUMARATE 1MG-10(24)
1 KIT ORAL DAILY
Qty: 84 TABLET | Refills: 3 | Status: SHIPPED | OUTPATIENT
Start: 2022-05-20 | End: 2022-09-26 | Stop reason: SDUPTHER

## 2022-05-20 RX ORDER — AMITRIPTYLINE HYDROCHLORIDE 10 MG/1
TABLET, FILM COATED ORAL
Qty: 180 TABLET | Refills: 3 | Status: SHIPPED | OUTPATIENT
Start: 2022-05-20 | End: 2023-04-26

## 2022-05-20 NOTE — PROGRESS NOTES
2022    SUBJECTIVE:   42 y.o. female for annual exam.    Past Medical History:   Diagnosis Date    Dysmenorrhea     Ectopic pregnancy     Endometriosis     IBS (irritable bowel syndrome)     Interstitial cystitis     Menarche     Age of onset 11    Ocular migraine     Ocular migraine 2021    Urine, incontinence, stress female        Past Surgical History:   Procedure Laterality Date     SECTION      x 2    LAPAROSCOPY  2001    SALPINGECTOMY      ECTOPIC PREGNANCY       Family History   Problem Relation Age of Onset    Diabetes Maternal Grandmother     Breast cancer Maternal Grandmother 70    Diabetes Maternal Grandfather     Hypertension Maternal Grandfather     Diabetes Father     Hypertension Father     Breast cancer Mother 61    Breast cancer Maternal Aunt 28    Colon cancer Neg Hx     Ovarian cancer Neg Hx     Vaginal cancer Neg Hx     Endometrial cancer Neg Hx     Cervical cancer Neg Hx        Social History     Socioeconomic History    Marital status:     Number of children: 2   Tobacco Use    Smoking status: Never Smoker    Smokeless tobacco: Never Used   Substance and Sexual Activity    Alcohol use: Yes    Drug use: Never    Sexual activity: Yes     Birth control/protection: OCP, Partner-Vasectomy       Current Outpatient Medications   Medication Sig Dispense Refill    amitriptyline (ELAVIL) 10 MG tablet Take 1-2 tabs nightly 180 tablet 3    LO LOESTRIN FE 1 mg-10 mcg (24)/10 mcg (2) Tab Take 1 tablet by mouth once daily. 84 tablet 3    tolterodine (DETROL LA) 4 MG 24 hr capsule Take 1 capsule (4 mg total) by mouth once daily. 90 capsule 3     No current facility-administered medications for this visit.       Review of patient's allergies indicates:   Allergen Reactions    Ciprofloxacin (bulk) Anaphylaxis    Codeine Nausea Only       No LMP recorded. (Menstrual status: Birth Control).    Well Woman:  Last pap: , normal per report (Jessica).   "No h/o abnl paps/STDs.   Last mammogram:  normal per report ([facility]).   Colonoscopy: , normal per report (hemorrhoids). For IBS.   DEXA: NA    OB History        3    Para   2    Term   2            AB   1    Living   2       SAB        IAB        Ectopic   1    Multiple        Live Births   2                   ROS:  Feeling well.   No dyspnea or chest pain on exertion.    No abdominal pain, change in bowel habits, black or bloody stools.  No major changes.  Takes fiber, probiotic, and amitriptyline  IC-- no recent flares.  Taking detrol LA.   GYN ROS: no breast pain or new or enlarging lumps on self exam, no vaginal bleeding.   No neurological complaints.    OBJECTIVE:   The patient appears well, alert, oriented x 3, in no distress.  /80 (BP Location: Right arm, Patient Position: Sitting, BP Method: Medium (Manual))   Ht 5' 7" (1.702 m)   Wt 59.6 kg (131 lb 6.3 oz)   BMI 20.58 kg/m²   ENT normal.  Neck supple. No adenopathy or thyromegaly. CARMELO.   Normal respiratory effort   Pulse with  regular rate and rhythm.   Abdomen soft without tenderness, guarding, mass or organomegaly.   Extremities show no edema, normal peripheral pulses.   Neurological is normal, no focal findings.    BREAST EXAM: breasts appear normal, no suspicious masses, no skin or nipple changes or axillary nodes    PELVIC EXAM:   VULVA: normal appearing vulva with no masses, tenderness or lesions,   VAGINA: normal appearing vagina with normal color and discharge, no lesions,  CERVIX: normal appearing cervix without discharge or lesions,   UTERUS: uterus is normal size, shape, consistency and nontender,   ADNEXA: no masses,   RECTAL: deferred    ASSESSMENT:   1. Well woman exam     2. Endometriosis  LO LOESTRIN FE 1 mg-10 mcg (24)/10 mcg (2) Tab    DISCONTINUED: LO LOESTRIN FE 1 mg-10 mcg (24)/10 mcg (2) Tab   3. At high risk for breast cancer  LO LOESTRIN FE 1 mg-10 mcg (24)/10 mcg (2) Tab    DISCONTINUED: LO " LOESTRIN FE 1 mg-10 mcg (24)/10 mcg (2) Tab   4. Interstitial cystitis  tolterodine (DETROL LA) 4 MG 24 hr capsule       PLAN:     1. Well woman  --pap today  --takes 2-3 weeks for results    2. At high risk breast cancer  --breast mri due 04/2021  --screening mammogram due 10/2021    3. History of abnormal uterine bleeding/ endometriosis  --continue ocp's    4.  IBS  --continue fiber + probiotic  --continue avoiding dietary triggers  --continue meditation/relaxation exercises     5. IC  --avoid dietary irritants              --could try prelief before any irritants to help reduce effect  --manage stress/ anxiety  --continue detrol LA 4 mg  --continue amitriptyline 10-20 mg daily; may increase shortterm for flares; call us if would like to send to mail order  --consider cystohydrodistension vs. Bladder instillations in not improved  --consider prelief because cystex worked for her  --consider elmiron in the future if needed  --consider PT in the future     6.  History of stress incontinence:  --Empty bladder every 3 hours.  Empty well: wait a minute, lean forward on toilet.    --KEGELS: do 10 in AM and 10 in PM, holding each x 10 seconds.  When you feel urge to go, STOP, KEGEL, and when urge has passed, then go to bathroom. re.      7. RTC 1 year for annual      30 minutes were spent in face to face time with this patient  90 % of this time was spent in counseling and/or coordination of care    Kelsey DAO [facility]and [facility]  Division of Female Pelvic Medicine and Reconstructive Surgery  Department of Obstetrics & Gynecology

## 2022-09-26 ENCOUNTER — PATIENT MESSAGE (OUTPATIENT)
Dept: UROGYNECOLOGY | Facility: CLINIC | Age: 42
End: 2022-09-26
Payer: COMMERCIAL

## 2022-09-26 ENCOUNTER — TELEPHONE (OUTPATIENT)
Dept: UROGYNECOLOGY | Facility: CLINIC | Age: 42
End: 2022-09-26
Payer: COMMERCIAL

## 2022-09-26 DIAGNOSIS — Z91.89 AT HIGH RISK FOR BREAST CANCER: ICD-10-CM

## 2022-09-26 DIAGNOSIS — N80.9 ENDOMETRIOSIS: ICD-10-CM

## 2022-09-26 RX ORDER — NORETHINDRONE ACETATE AND ETHINYL ESTRADIOL, ETHINYL ESTRADIOL AND FERROUS FUMARATE 1MG-10(24)
1 KIT ORAL DAILY
Qty: 84 TABLET | Refills: 3 | Status: SHIPPED | OUTPATIENT
Start: 2022-09-26 | End: 2023-06-16

## 2022-09-26 RX ORDER — NORETHINDRONE ACETATE AND ETHINYL ESTRADIOL, ETHINYL ESTRADIOL AND FERROUS FUMARATE 1MG-10(24)
1 KIT ORAL DAILY
Qty: 84 TABLET | Refills: 3 | Status: SHIPPED | OUTPATIENT
Start: 2022-09-26 | End: 2022-09-26 | Stop reason: SDUPTHER

## 2022-09-26 NOTE — TELEPHONE ENCOUNTER
----- Message from Victorina Rivera sent at 9/26/2022  8:18 AM CDT -----  Regarding: coverage review  Name of Who is Calling: BRIAN RAYA [[id number]]      What is the request in detail: Patient is requesting a call back she states her insurance want fill her prescription because of a coverage review is needed for her LO LOESTRIN FE 1 mg-10 mcg (24)/10 mcg (2) Tab medication      Can the clinic reply by [facility]: yes      What Number to Call Back if not in [facility]: [phone]

## 2022-09-29 NOTE — TELEPHONE ENCOUNTER
Urgent appeal faxed today.  Awaiting results. Patient has been able to use a coupon for $33/ month rx since insurance denied.  NELY SadlerP-BC

## 2022-10-03 ENCOUNTER — TELEPHONE (OUTPATIENT)
Dept: UROGYNECOLOGY | Facility: CLINIC | Age: 42
End: 2022-10-03
Payer: COMMERCIAL

## 2022-10-03 NOTE — TELEPHONE ENCOUNTER
Annette[facility] pharmacist stated the LO LOESTRIN FE 1 mg-10 mcg (24)/10 mcg (2) Tab is not covered by pt's insurance but [facility] 24 fe 1/20 is.

## 2022-10-05 ENCOUNTER — PATIENT OUTREACH (OUTPATIENT)
Dept: ADMINISTRATIVE | Facility: HOSPITAL | Age: 42
End: 2022-10-05
Payer: COMMERCIAL

## 2022-10-05 ENCOUNTER — PATIENT MESSAGE (OUTPATIENT)
Dept: ADMINISTRATIVE | Facility: HOSPITAL | Age: 42
End: 2022-10-05
Payer: COMMERCIAL

## 2022-10-11 ENCOUNTER — PATIENT MESSAGE (OUTPATIENT)
Dept: UROGYNECOLOGY | Facility: CLINIC | Age: 42
End: 2022-10-11
Payer: COMMERCIAL

## 2022-11-17 ENCOUNTER — HOSPITAL ENCOUNTER (OUTPATIENT)
Dept: RADIOLOGY | Facility: HOSPITAL | Age: 42
Discharge: HOME | End: 2022-11-17
Attending: NURSE PRACTITIONER
Payer: COMMERCIAL

## 2022-11-17 DIAGNOSIS — Z80.3 FAMILY HISTORY OF BREAST CANCER: ICD-10-CM

## 2022-11-17 DIAGNOSIS — Z91.89 AT HIGH RISK FOR BREAST CANCER: ICD-10-CM

## 2022-11-17 PROCEDURE — 77063 BREAST TOMOSYNTHESIS BI: CPT | Mod: TC

## 2022-11-17 PROCEDURE — 77067 MAMMO DIGITAL SCREENING BILAT WITH TOMO: ICD-10-PCS | Mod: 26,,, | Performed by: RADIOLOGY

## 2022-11-17 PROCEDURE — 77063 MAMMO DIGITAL SCREENING BILAT WITH TOMO: ICD-10-PCS | Mod: 26,,, | Performed by: RADIOLOGY

## 2022-11-17 PROCEDURE — 77063 BREAST TOMOSYNTHESIS BI: CPT | Mod: 26,,, | Performed by: RADIOLOGY

## 2022-11-17 PROCEDURE — 77067 SCR MAMMO BI INCL CAD: CPT | Mod: 26,,, | Performed by: RADIOLOGY

## 2022-11-26 ENCOUNTER — OFFICE VISIT (OUTPATIENT)
Dept: URGENT CARE | Facility: CLINIC | Age: 42
End: 2022-11-26
Payer: COMMERCIAL

## 2022-11-26 VITALS
BODY MASS INDEX: 20.4 KG/M2 | SYSTOLIC BLOOD PRESSURE: 128 MMHG | OXYGEN SATURATION: 100 % | HEIGHT: 67 IN | DIASTOLIC BLOOD PRESSURE: 68 MMHG | TEMPERATURE: 99 F | WEIGHT: 130 LBS | HEART RATE: 92 BPM | RESPIRATION RATE: 18 BRPM

## 2022-11-26 DIAGNOSIS — J02.9 PHARYNGITIS, UNSPECIFIED ETIOLOGY: Primary | ICD-10-CM

## 2022-11-26 DIAGNOSIS — J02.9 SORE THROAT: ICD-10-CM

## 2022-11-26 DIAGNOSIS — R05.9 COUGH, UNSPECIFIED TYPE: ICD-10-CM

## 2022-11-26 LAB
CTP QC/QA: YES
MOLECULAR STREP A: NEGATIVE
POC MOLECULAR INFLUENZA A AGN: NEGATIVE
POC MOLECULAR INFLUENZA B AGN: NEGATIVE
SARS-COV-2 AG RESP QL IA.RAPID: NEGATIVE

## 2022-11-26 PROCEDURE — 87651 POCT STREP A MOLECULAR: ICD-10-PCS | Mod: QW,S$GLB,, | Performed by: NURSE PRACTITIONER

## 2022-11-26 PROCEDURE — 3074F PR MOST RECENT SYSTOLIC BLOOD PRESSURE < 130 MM HG: ICD-10-PCS | Mod: CPTII,S$GLB,, | Performed by: NURSE PRACTITIONER

## 2022-11-26 PROCEDURE — 87502 INFLUENZA DNA AMP PROBE: CPT | Mod: QW,S$GLB,, | Performed by: NURSE PRACTITIONER

## 2022-11-26 PROCEDURE — 3078F DIAST BP <80 MM HG: CPT | Mod: CPTII,S$GLB,, | Performed by: NURSE PRACTITIONER

## 2022-11-26 PROCEDURE — 3008F PR BODY MASS INDEX (BMI) DOCUMENTED: ICD-10-PCS | Mod: CPTII,S$GLB,, | Performed by: NURSE PRACTITIONER

## 2022-11-26 PROCEDURE — 87811 SARS-COV-2 COVID19 W/OPTIC: CPT | Mod: QW,S$GLB,, | Performed by: NURSE PRACTITIONER

## 2022-11-26 PROCEDURE — 1160F RVW MEDS BY RX/DR IN RCRD: CPT | Mod: CPTII,S$GLB,, | Performed by: NURSE PRACTITIONER

## 2022-11-26 PROCEDURE — 87651 STREP A DNA AMP PROBE: CPT | Mod: QW,S$GLB,, | Performed by: NURSE PRACTITIONER

## 2022-11-26 PROCEDURE — 1160F PR REVIEW ALL MEDS BY PRESCRIBER/CLIN PHARMACIST DOCUMENTED: ICD-10-PCS | Mod: CPTII,S$GLB,, | Performed by: NURSE PRACTITIONER

## 2022-11-26 PROCEDURE — 87811 SARS CORONAVIRUS 2 ANTIGEN POCT, MANUAL READ: ICD-10-PCS | Mod: QW,S$GLB,, | Performed by: NURSE PRACTITIONER

## 2022-11-26 PROCEDURE — 1159F PR MEDICATION LIST DOCUMENTED IN MEDICAL RECORD: ICD-10-PCS | Mod: CPTII,S$GLB,, | Performed by: NURSE PRACTITIONER

## 2022-11-26 PROCEDURE — 3008F BODY MASS INDEX DOCD: CPT | Mod: CPTII,S$GLB,, | Performed by: NURSE PRACTITIONER

## 2022-11-26 PROCEDURE — 1159F MED LIST DOCD IN RCRD: CPT | Mod: CPTII,S$GLB,, | Performed by: NURSE PRACTITIONER

## 2022-11-26 PROCEDURE — 3074F SYST BP LT 130 MM HG: CPT | Mod: CPTII,S$GLB,, | Performed by: NURSE PRACTITIONER

## 2022-11-26 PROCEDURE — 3078F PR MOST RECENT DIASTOLIC BLOOD PRESSURE < 80 MM HG: ICD-10-PCS | Mod: CPTII,S$GLB,, | Performed by: NURSE PRACTITIONER

## 2022-11-26 PROCEDURE — 99203 PR OFFICE/OUTPT VISIT, NEW, LEVL III, 30-44 MIN: ICD-10-PCS | Mod: S$GLB,,, | Performed by: NURSE PRACTITIONER

## 2022-11-26 PROCEDURE — 87502 POCT INFLUENZA A/B MOLECULAR: ICD-10-PCS | Mod: QW,S$GLB,, | Performed by: NURSE PRACTITIONER

## 2022-11-26 PROCEDURE — 99203 OFFICE O/P NEW LOW 30 MIN: CPT | Mod: S$GLB,,, | Performed by: NURSE PRACTITIONER

## 2022-11-26 RX ORDER — BENZONATATE 100 MG/1
100 CAPSULE ORAL 3 TIMES DAILY PRN
Qty: 30 CAPSULE | Refills: 0 | Status: SHIPPED | OUTPATIENT
Start: 2022-11-26 | End: 2022-12-06

## 2022-11-26 RX ORDER — AMOXICILLIN 500 MG/1
500 TABLET, FILM COATED ORAL EVERY 12 HOURS
Qty: 20 TABLET | Refills: 0 | Status: SHIPPED | OUTPATIENT
Start: 2022-11-26 | End: 2022-12-06

## 2022-11-26 NOTE — PROGRESS NOTES
"Subjective:       Patient ID: Ghada Manley is a 42 y.o. female.    Vitals:  height is 5' 7" (1.702 m) and weight is 59 kg (130 lb). Her temperature is 98.8 °F (37.1 °C). Her blood pressure is 128/68 and her pulse is 92. Her respiration is 18 and oxygen saturation is 100%.     Chief Complaint: Sore Throat (Entered by patient)    43yo female pt reports sore throat and dry cough with postnasal drip worsening over past 2-3 days.  Denies difficulty swallowing.  Reports mild relief with Mucinex.  Denies fever/chills, denies n/v, reports some diarrhea yesterday.  Denies chest pain but reports chest tightness occasionally, denies wheezing or SOB.  Reports multiple known sick contacts.  Reports receiving both COVID and flu vaccinations.    Sore Throat   This is a new problem. The current episode started in the past 7 days. The problem has been waxing and waning. Neither side of throat is experiencing more pain than the other. There has been no fever. The pain is at a severity of 4/10. Associated symptoms include congestion, coughing and diarrhea. Pertinent negatives include no ear pain, headaches, shortness of breath, trouble swallowing or vomiting. The treatment provided mild relief.     Constitution: Negative for chills and fever.   HENT:  Positive for congestion, postnasal drip and sore throat. Negative for ear pain and trouble swallowing.    Cardiovascular:  Negative for chest pain.   Respiratory:  Positive for chest tightness and cough. Negative for sputum production, shortness of breath and wheezing.    Gastrointestinal:  Positive for diarrhea. Negative for nausea and vomiting.   Neurological:  Negative for headaches.     Objective:      Physical Exam   Constitutional: She is oriented to person, place, and time. She appears well-developed. She is cooperative.  Non-toxic appearance. She does not appear ill. No distress.   HENT:   Head: Normocephalic and atraumatic.   Ears:   Right Ear: Hearing, external ear and " ear canal normal. Tympanic membrane is bulging. Tympanic membrane is not erythematous and not retracted. A middle ear effusion (clear fluid) is present.   Left Ear: Hearing, external ear and ear canal normal. Tympanic membrane is bulging. Tympanic membrane is not erythematous and not retracted. A middle ear effusion (clear fluid) is present.   Nose: Mucosal edema (erythema to BL turbinates) present. No rhinorrhea, purulent discharge or nasal deformity. No epistaxis. Right sinus exhibits no maxillary sinus tenderness and no frontal sinus tenderness. Left sinus exhibits no maxillary sinus tenderness and no frontal sinus tenderness.   Mouth/Throat: Uvula is midline and mucous membranes are normal. No trismus in the jaw. Normal dentition. No uvula swelling. Oropharyngeal exudate (clear postnasal drip) and posterior oropharyngeal erythema present. No posterior oropharyngeal edema. Tonsils are 1+ on the right. Tonsils are 1+ on the left. Tonsillar exudate (cloudy film to BL tonsils).   Eyes: Conjunctivae and lids are normal. No scleral icterus.   Neck: Trachea normal and phonation normal. Neck supple. No edema present. No erythema present. No neck rigidity present.   Cardiovascular: Normal rate, regular rhythm, normal heart sounds and normal pulses.   Pulmonary/Chest: Effort normal and breath sounds normal. No accessory muscle usage or stridor. No tachypnea. No respiratory distress. She has no decreased breath sounds. She has no wheezes. She has no rhonchi. She has no rales.   Abdominal: Normal appearance.   Musculoskeletal: Normal range of motion.         General: No deformity. Normal range of motion.   Lymphadenopathy:        Head (right side): No submandibular adenopathy present.        Head (left side): No submandibular adenopathy present.     She has no cervical adenopathy.   Neurological: She is alert and oriented to person, place, and time. She exhibits normal muscle tone. Coordination normal.   Skin: Skin is warm,  dry, intact, not diaphoretic and not pale.   Psychiatric: Her speech is normal and behavior is normal. Judgment and thought content normal.   Nursing note and vitals reviewed.    Results for orders placed or performed in visit on 11/26/22   POCT Strep A, Molecular   Result Value Ref Range    Molecular Strep A, POC Negative Negative     Acceptable Yes    POCT Influenza A/B MOLECULAR   Result Value Ref Range    POC Molecular Influenza A Ag Negative Negative, Not Reported    POC Molecular Influenza B Ag Negative Negative, Not Reported     Acceptable Yes    SARS Coronavirus 2 Antigen, POCT Manual Read   Result Value Ref Range    SARS Coronavirus 2 Antigen Negative Negative     Acceptable Yes            Assessment:       1. Pharyngitis, unspecified etiology    2. Sore throat    3. Cough, unspecified type          Plan:       Provided education on prescribed medications, recommended daily Claritin and Flonase nasal spray, pt reports that she has some at home and does not require prescription.  Recommended NSAIDs for additional pain relief.  Prescribed Amoxil as wait-and-see antibiotic, recommended for pt to wait 2-3 days to see if symptoms resolve with symptomatic treatment before starting antibiotics, pt verbalized agreement.  Provided education on return/ER precautions.  Pt verbalized understanding and agreed to plan.      Pharyngitis, unspecified etiology  -     amoxicillin (AMOXIL) 500 MG Tab; Take 1 tablet (500 mg total) by mouth every 12 (twelve) hours. for 10 days  Dispense: 20 tablet; Refill: 0  -     (Magic mouthwash) 1:1:1 diphenhydrAMINE(Benadryl) 12.5mg/5ml liq, aluminum & magnesium hydroxide-simethicone (Maalox), LIDOcaine viscous 2%; Swish and spit 10 mLs every 4 (four) hours as needed (throat pain).  Dispense: 360 mL; Refill: 0    Sore throat  -     POCT Strep A, Molecular  -     POCT Influenza A/B MOLECULAR  -     SARS Coronavirus 2 Antigen, POCT Manual  Read    Cough, unspecified type  -     benzonatate (TESSALON) 100 MG capsule; Take 1 capsule (100 mg total) by mouth 3 (three) times daily as needed for Cough.  Dispense: 30 capsule; Refill: 0       Patient Instructions   If your condition worsens or fails to improve, we recommend that you receive another evaluation at the ER immediately, contact your PCP to discuss your concerns, or return here.  You must understand that you've received an urgent care treatment only, and that you may be released before all your medical problems are known or treated.  You, the patient, will arrange for followup care as instructed.     If the strep culture was done and returns negative in 3-5 days and you are still having a sore throat, you may need to get a mono spot test done or repeated.     Tylenol or Ibuprofen for pain may help as long as you are not allergic to these meds or have a medical condition (such as stomach ulcers, liver or kidney disease, or taking blood thinners, etc.) that would prevent you from using these medications. Rest and fluids will help as well. Anti-histamines, such as Claritin, Zyrtec, and Allegra, can help with reducing postnasal drip.  Flonase nasal spray can help with nasal/sinus congestion and postnasal drip as well.  Gargling with warm salt water and drinking hot tea or soups can help with alleviating pain.    If you were prescribed antibiotics and are female and on birth control pills, use additional methods to prevent pregnancy while on the antibiotics and for one cycle after.

## 2022-11-26 NOTE — PATIENT INSTRUCTIONS
If your condition worsens or fails to improve, we recommend that you receive another evaluation at the ER immediately, contact your PCP to discuss your concerns, or return here.  You must understand that you've received an urgent care treatment only, and that you may be released before all your medical problems are known or treated.  You, the patient, will arrange for followup care as instructed.     If the strep culture was done and returns negative in 3-5 days and you are still having a sore throat, you may need to get a mono spot test done or repeated.     Tylenol or Ibuprofen for pain may help as long as you are not allergic to these meds or have a medical condition (such as stomach ulcers, liver or kidney disease, or taking blood thinners, etc.) that would prevent you from using these medications. Rest and fluids will help as well. Anti-histamines, such as Claritin, Zyrtec, and Allegra, can help with reducing postnasal drip.  Flonase nasal spray can help with nasal/sinus congestion and postnasal drip as well.  Gargling with warm salt water and drinking hot tea or soups can help with alleviating pain.    If you were prescribed antibiotics and are female and on birth control pills, use additional methods to prevent pregnancy while on the antibiotics and for one cycle after.

## 2022-11-29 ENCOUNTER — PATIENT MESSAGE (OUTPATIENT)
Dept: PRIMARY CARE CLINIC | Facility: CLINIC | Age: 42
End: 2022-11-29
Payer: COMMERCIAL

## 2022-12-02 ENCOUNTER — PATIENT MESSAGE (OUTPATIENT)
Dept: HEMATOLOGY/ONCOLOGY | Facility: CLINIC | Age: 42
End: 2022-12-02
Payer: COMMERCIAL

## 2022-12-19 ENCOUNTER — OFFICE VISIT (OUTPATIENT)
Dept: URGENT CARE | Facility: CLINIC | Age: 42
End: 2022-12-19
Payer: COMMERCIAL

## 2022-12-19 VITALS
BODY MASS INDEX: 20.4 KG/M2 | OXYGEN SATURATION: 100 % | DIASTOLIC BLOOD PRESSURE: 77 MMHG | SYSTOLIC BLOOD PRESSURE: 114 MMHG | HEART RATE: 80 BPM | WEIGHT: 130 LBS | HEIGHT: 67 IN | RESPIRATION RATE: 16 BRPM | TEMPERATURE: 98 F

## 2022-12-19 DIAGNOSIS — R05.8 OTHER COUGH: ICD-10-CM

## 2022-12-19 DIAGNOSIS — J40 BRONCHITIS: Primary | ICD-10-CM

## 2022-12-19 PROCEDURE — 71046 XR CHEST PA AND LATERAL: ICD-10-PCS | Mod: S$GLB,,, | Performed by: RADIOLOGY

## 2022-12-19 PROCEDURE — 3078F DIAST BP <80 MM HG: CPT | Mod: CPTII,S$GLB,, | Performed by: PHYSICIAN ASSISTANT

## 2022-12-19 PROCEDURE — 1160F RVW MEDS BY RX/DR IN RCRD: CPT | Mod: CPTII,S$GLB,, | Performed by: PHYSICIAN ASSISTANT

## 2022-12-19 PROCEDURE — 1160F PR REVIEW ALL MEDS BY PRESCRIBER/CLIN PHARMACIST DOCUMENTED: ICD-10-PCS | Mod: CPTII,S$GLB,, | Performed by: PHYSICIAN ASSISTANT

## 2022-12-19 PROCEDURE — 71046 X-RAY EXAM CHEST 2 VIEWS: CPT | Mod: S$GLB,,, | Performed by: RADIOLOGY

## 2022-12-19 PROCEDURE — 3008F PR BODY MASS INDEX (BMI) DOCUMENTED: ICD-10-PCS | Mod: CPTII,S$GLB,, | Performed by: PHYSICIAN ASSISTANT

## 2022-12-19 PROCEDURE — 1159F MED LIST DOCD IN RCRD: CPT | Mod: CPTII,S$GLB,, | Performed by: PHYSICIAN ASSISTANT

## 2022-12-19 PROCEDURE — 99213 PR OFFICE/OUTPT VISIT, EST, LEVL III, 20-29 MIN: ICD-10-PCS | Mod: S$GLB,,, | Performed by: PHYSICIAN ASSISTANT

## 2022-12-19 PROCEDURE — 99213 OFFICE O/P EST LOW 20 MIN: CPT | Mod: S$GLB,,, | Performed by: PHYSICIAN ASSISTANT

## 2022-12-19 PROCEDURE — 3074F SYST BP LT 130 MM HG: CPT | Mod: CPTII,S$GLB,, | Performed by: PHYSICIAN ASSISTANT

## 2022-12-19 PROCEDURE — 3008F BODY MASS INDEX DOCD: CPT | Mod: CPTII,S$GLB,, | Performed by: PHYSICIAN ASSISTANT

## 2022-12-19 PROCEDURE — 3074F PR MOST RECENT SYSTOLIC BLOOD PRESSURE < 130 MM HG: ICD-10-PCS | Mod: CPTII,S$GLB,, | Performed by: PHYSICIAN ASSISTANT

## 2022-12-19 PROCEDURE — 3078F PR MOST RECENT DIASTOLIC BLOOD PRESSURE < 80 MM HG: ICD-10-PCS | Mod: CPTII,S$GLB,, | Performed by: PHYSICIAN ASSISTANT

## 2022-12-19 PROCEDURE — 1159F PR MEDICATION LIST DOCUMENTED IN MEDICAL RECORD: ICD-10-PCS | Mod: CPTII,S$GLB,, | Performed by: PHYSICIAN ASSISTANT

## 2022-12-19 RX ORDER — ALBUTEROL SULFATE 90 UG/1
2 AEROSOL, METERED RESPIRATORY (INHALATION) EVERY 4 HOURS PRN
Qty: 18 G | Refills: 0 | Status: SHIPPED | OUTPATIENT
Start: 2022-12-19 | End: 2023-01-12

## 2022-12-19 RX ORDER — PREDNISONE 20 MG/1
20 TABLET ORAL DAILY
Qty: 4 TABLET | Refills: 0 | Status: SHIPPED | OUTPATIENT
Start: 2022-12-19 | End: 2022-12-23

## 2022-12-19 RX ORDER — PROMETHAZINE HYDROCHLORIDE AND DEXTROMETHORPHAN HYDROBROMIDE 6.25; 15 MG/5ML; MG/5ML
5 SYRUP ORAL
Qty: 118 ML | Refills: 0 | Status: SHIPPED | OUTPATIENT
Start: 2022-12-19 | End: 2023-01-12

## 2022-12-19 NOTE — PROGRESS NOTES
"Subjective:       Patient ID: Ghada Manley is a 42 y.o. female.    Vitals:  height is 5' 7" (1.702 m) and weight is 59 kg (130 lb). Her temperature is 98.2 °F (36.8 °C). Her blood pressure is 114/77 and her pulse is 80. Her respiration is 16 and oxygen saturation is 100%.     Chief Complaint: Cough    Pt presents today with complaints of persistent productive cough. Pt states she was seen last week and treated for symptoms.  Patient was treated with 10 days of amoxicillin which she states she completed.  She denies fevers, nasal congestion, sinus pain or pressure, chest pain, shortness of breath.  She complains of persistent cough with occasional white mucus production.  She has taken benzonatate without any improvement.  She tested negative for COVID, flu and strep.  Says her sore throat has resolved.  She denies rash, abdominal pain, nausea, vomiting or diarrhea or recent international travel. MEB      Cough  This is a new problem. Episode onset: 11/24/2022. The problem has been gradually worsening. The problem occurs constantly. The cough is Productive of sputum (grey sputum). Associated symptoms include nasal congestion. Pertinent negatives include no chest pain, chills, ear pain, fever, headaches, myalgias, postnasal drip, rash, sore throat, shortness of breath or wheezing. Associated symptoms comments: Chest tightness. The symptoms are aggravated by lying down. Treatments tried: amoxicillin. The treatment provided no relief. There is no history of asthma, bronchitis or pneumonia.     Constitution: Negative for chills, fever and international travel in last 60 days.   HENT:  Negative for ear pain, congestion, postnasal drip and sore throat.    Neck: Negative for painful lymph nodes.   Cardiovascular:  Negative for chest pain.   Respiratory:  Positive for cough. Negative for shortness of breath, wheezing and asthma.    Gastrointestinal:  Negative for abdominal pain, nausea and vomiting. "   Musculoskeletal:  Negative for muscle ache.   Skin:  Negative for rash.   Allergic/Immunologic: Negative for asthma.   Neurological:  Negative for headaches.   Hematologic/Lymphatic: Negative for swollen lymph nodes.     Objective:      Physical Exam   Constitutional: She is oriented to person, place, and time. She appears well-developed. She is cooperative.  Non-toxic appearance. She does not appear ill. No distress.   HENT:   Head: Normocephalic and atraumatic.   Ears:   Right Ear: Hearing, tympanic membrane, external ear and ear canal normal.   Left Ear: Hearing, tympanic membrane, external ear and ear canal normal.   Nose: Nose normal. No mucosal edema, rhinorrhea or nasal deformity. No epistaxis. Right sinus exhibits no maxillary sinus tenderness and no frontal sinus tenderness. Left sinus exhibits no maxillary sinus tenderness and no frontal sinus tenderness.   Mouth/Throat: Uvula is midline, oropharynx is clear and moist and mucous membranes are normal. No trismus in the jaw. Normal dentition. No uvula swelling. No oropharyngeal exudate, posterior oropharyngeal edema or posterior oropharyngeal erythema.   Eyes: Conjunctivae and lids are normal. No scleral icterus.   Neck: Trachea normal and phonation normal. Neck supple. No edema present. No erythema present. No neck rigidity present.   Cardiovascular: Normal rate, regular rhythm, normal heart sounds and normal pulses.   Pulmonary/Chest: Effort normal and breath sounds normal. No respiratory distress. She has no decreased breath sounds. She has no rhonchi.   Abdominal: Normal appearance.   Musculoskeletal: Normal range of motion.         General: No deformity. Normal range of motion.   Neurological: She is alert and oriented to person, place, and time. She exhibits normal muscle tone. Coordination normal.   Skin: Skin is warm, dry, intact, not diaphoretic and not pale.   Psychiatric: Her speech is normal and behavior is normal. Judgment and thought content  normal.   Nursing note and vitals reviewed.        X-Ray Chest PA And Lateral    Result Date: 12/19/2022  EXAMINATION: XR CHEST PA AND LATERAL CLINICAL HISTORY: Other specified cough TECHNIQUE: PA and lateral views of the chest were performed. COMPARISON: None FINDINGS: Heart size normal.  The lungs are clear.  No pleural effusion     See above Electronically signed by: John Win MD Date:    12/19/2022 Time:    10:02     Assessment:       1. Bronchitis    2. Other cough          Plan:         Bronchitis  -     predniSONE (DELTASONE) 20 MG tablet; Take 1 tablet (20 mg total) by mouth once daily. for 4 days  Dispense: 4 tablet; Refill: 0  -     albuterol (PROVENTIL/VENTOLIN HFA) 90 mcg/actuation inhaler; Inhale 2 puffs into the lungs every 4 (four) hours as needed for Wheezing or Shortness of Breath. Rescue  Dispense: 18 g; Refill: 0  -     promethazine-dextromethorphan (PROMETHAZINE-DM) 6.25-15 mg/5 mL Syrp; Take 5 mLs by mouth every 4 to 6 hours as needed (cough).  Dispense: 118 mL; Refill: 0    Other cough  -     X-Ray Chest PA And Lateral

## 2022-12-30 NOTE — PROGRESS NOTES
Reason For Follow Up: Increased lifetime risk of breast cancer      HPI:   Ghada Manley presents for a follow up of increased risk of breast cancer. Her most recent screening mammogram on 11/17/22 and her most recent MRI was on 4/27/22.      Today, Feels good and no complaints.   No breast concerns. Denies changes to breast health. Denies skin changes and nipple discharge.  Denies changes to family history.  Denies smoking, alcohol use 4-6 glasses week, exercise 2-30 minutes 5 days weeks.  Follows up with GYN for a 2nd breast exam year    High Risk Breast cancer specific history:  - See original consultation note in high risk breast clinic from 10/13/21.         SEE CALCULATED RISK BELOW.     Past Medical   Past Medical History:   Diagnosis Date    Dysmenorrhea     Ectopic pregnancy     Endometriosis     IBS (irritable bowel syndrome)     Interstitial cystitis     Menarche     Age of onset 11    Ocular migraine     Ocular migraine 09/20/2021    Urine, incontinence, stress female      Patient Active Problem List   Diagnosis    Family history of breast cancer    Interstitial cystitis    Irritable bowel syndrome    Endometriosis    Tinnitus of left ear    Suspected COVID-19 virus infection    Wheezing    Acute right-sided low back pain with right-sided sciatica    Ocular migraine    Screening mammogram, encounter for     Social History   Social History     Tobacco Use    Smoking status: Never    Smokeless tobacco: Never   Substance Use Topics    Alcohol use: Yes    Drug use: Never     Family History  Family History   Problem Relation Age of Onset    Diabetes Maternal Grandmother     Breast cancer Maternal Grandmother     Diabetes Maternal Grandfather     Hypertension Maternal Grandfather     Diabetes Father     Hypertension Father     Breast cancer Mother     Breast cancer Maternal Aunt     Colon cancer Neg Hx     Ovarian cancer Neg Hx     Vaginal cancer Neg Hx     Endometrial cancer Neg Hx     Cervical cancer  "Neg Hx      Medications    Current Outpatient Medications:     albuterol (PROVENTIL/VENTOLIN HFA) 90 mcg/actuation inhaler, Inhale 2 puffs into the lungs every 4 (four) hours as needed for Wheezing or Shortness of Breath. Rescue, Disp: 18 g, Rfl: 0    amitriptyline (ELAVIL) 10 MG tablet, Take 1-2 tabs nightly, Disp: 180 tablet, Rfl: 3    LO LOESTRIN FE 1 mg-10 mcg (24)/10 mcg (2) Tab, Take 1 tablet by mouth once daily., Disp: 84 tablet, Rfl: 3    promethazine-dextromethorphan (PROMETHAZINE-DM) 6.25-15 mg/5 mL Syrp, Take 5 mLs by mouth every 4 to 6 hours as needed (cough)., Disp: 118 mL, Rfl: 0    tolterodine (DETROL LA) 4 MG 24 hr capsule, Take 1 capsule (4 mg total) by mouth once daily., Disp: 90 capsule, Rfl: 3  Allergies  Review of patient's allergies indicates:   Allergen Reactions    Ciprofloxacin (bulk) Anaphylaxis    Codeine Nausea Only       Review of Systems       See above   Review of Systems  Review of Systems   Constitutional:  Negative for appetite change and unexpected weight change.   HENT:  Negative for mouth sores.    Eyes:  Negative for visual disturbance.   Respiratory:  Negative for cough and shortness of breath.    Cardiovascular:  Negative for chest pain.   Gastrointestinal:  Negative for abdominal pain and diarrhea.   Genitourinary:  Negative for frequency.   Musculoskeletal:  Negative for back pain.   Skin:  Negative for rash.   Neurological:  Negative for headaches.   Hematological:  Negative for adenopathy.   Psychiatric/Behavioral:  The patient is nervous/anxious.    All other systems reviewed and are negative.    Objective:      Vitals:   Vitals:    01/12/23 1009   BP: 114/66   BP Location: Left arm   Patient Position: Sitting   Pulse: 73   Resp: 18   Temp: 98.2 °F (36.8 °C)   TempSrc: Oral   SpO2: 98%   Weight: 63.2 kg (139 lb 5.3 oz)   Height: 5' 7" (1.702 m)     BMI: Body mass index is 21.82 kg/m².   Body surface area is 1.73 meters squared.    Physical Exam  Vitals and nursing note " reviewed.   Constitutional:       General: She is not in acute distress.     Appearance: Normal appearance. She is well-developed.   HENT:      Head: Normocephalic.   Cardiovascular:      Rate and Rhythm: Normal rate and regular rhythm.      Heart sounds: Normal heart sounds.   Pulmonary:      Effort: Pulmonary effort is normal.      Breath sounds: Normal breath sounds.   Chest:   Breasts:     Right: Normal.      Left: Normal.   Musculoskeletal:      Cervical back: Normal range of motion.   Lymphadenopathy:      Cervical: No cervical adenopathy.      Upper Body:      Right upper body: No supraclavicular or axillary adenopathy.      Left upper body: No supraclavicular or axillary adenopathy.   Skin:     General: Skin is warm and dry.      Findings: No rash.   Neurological:      Mental Status: She is alert and oriented to person, place, and time.   Psychiatric:         Behavior: Behavior normal.         Laboratory Data: reviewed most recent   Imaging: reviewed most recent        Assessment:     1. Family history of breast cancer        1. Increased risk of breast cancer   * Tyrer-Cuzick (TC) lifetime risk of 27.5%. We discussed that TC score will categorize your lifetime risk of being diagnosed with breast cancer. Categories are as such: Average risk <15%, Intermediate risk 15-19%, and High risk > or = to 20%.    * The Lolis Model for Breast Cancer risk: She has a 1.3% 5-year breast cancer risk (Compared with 0.7% for the average 43 y.o. woman) and 18.5% Lifetime breast cancer risk (Compared with 12.2% for the average 43 y.o. woman). A woman's risk is considered low if her five-year risk of developing breast cancer is less than 1.6%; it is considered high if she scores above 1.66%. (All women who are over 60 have a score of at least 1.66 and are considered high risk, based on the Lolis Model.)      Risk factors are categorized into 2 groups: Modifiable and Non-modifiable. Modifiable risk factors include use of hormones,  alcohol, smoking, diet and exercise. Non-modifiable risk factors include breast density, genetics, chest radiation, previous pregnancies, age of first period, and age of menopause.    * For women at high risk for breast cancer, endocrine therapy can reduce the risk of invasive and/or in situ breast cancers. (tamoxifen for premenopausal or postmenopausal women and raloxifene or exemestane for postmenopausal women).   * Discussed that Tamoxifen 20 mg daily for 5 years has shown to reduce risk of breast cancer by 49%. At current, there is not adequate data to recommend longer courses of therapy more than 5 years for risk reduction.      * Reviewed risks of Tamoxifen side effects include hot flashes, invasive endometrial cancer in women > 49 years of age (2.3/1000 compared to 0.9/1000), cataracts, increased risk of pulmonary embolism among others.    * Reviewed Lifestyle modifications which have shown benefit:  Limit alcohol consumption to less than 1 drink per day (1 ounce liquor, 6 oz wine, 8 oz beer)  Avoid smoking.  Exercise at least 150 minutes per week of moderate intensity aerobic activity or at least 75 minutes of vigorous activity. Exercise can lower the relative risk of breast cancer by ~18-20%.  Maintain healthy weight and avoid post-menopausal weight gain. Avoid processed foods and eat more lean proteins, fruits and vegetables.       Plan:     Patient has opted out chemoprevention but will call in the future if she wishes to pursue.   Patient elects to proceed with alternating annual mammogram and annual breast MRI along with semiannual CBEs.  Patient will follow up with PCP or GYN for one semiannual CBE along with annual mammogram in Nov 2023 and will RTC here for one semiannual CBE along with annual breast MRI in April 2023.  Lifestyle modifications as detailed above.   5.   Encouraged breast awareness, including monthly breast self-exams.       RTC in 1 year to see me either at [facility] or on 3rd  floor.    Questions were encouraged and answered to patient's satisfaction, and patient verbalized understanding of information and agreement with the plan. Advised patient to RTC with any interval changes or concerns.        Patient is in agreement with the proposed treatment plan. All questions were answered to the patient's satisfaction. Patient knows to call clinic for any new or worsening symptoms and if anything is needed before the next clinic visit.          Vira Bunch, FNP-C  Hematology & Medical Oncology   [facility] [place] [address]  ph. [phone]  Fax. [phone]    Collaborating physician, Dr. Flynn    Total time spent with the patient: 20 minutes, 10 minutes of face to face consultation and 10 minutes of chart review and coordination of care, on the day of the visit. This includes face to face time and non-face to face time preparing to see the patient (eg, review of tests), obtaining and/or reviewing separately obtained history, documenting clinical information in the electronic or other health record, independently interpreting resultsand communicating results to the patient/family/caregiver, or care coordination.    Route Chart for Scheduling    Med Onc Chart Routing      Follow up with physician    Follow up with [facility] 1 year.   Infusion scheduling note    Injection scheduling note    Labs    Imaging Mammogram and MRI   MRI in April, Mammo in Nov   Pharmacy appointment    Other referrals               '

## 2023-01-12 ENCOUNTER — OFFICE VISIT (OUTPATIENT)
Dept: HEMATOLOGY/ONCOLOGY | Facility: CLINIC | Age: 43
End: 2023-01-12
Payer: COMMERCIAL

## 2023-01-12 VITALS
RESPIRATION RATE: 18 BRPM | HEIGHT: 67 IN | OXYGEN SATURATION: 98 % | HEART RATE: 73 BPM | SYSTOLIC BLOOD PRESSURE: 114 MMHG | BODY MASS INDEX: 21.87 KG/M2 | WEIGHT: 139.31 LBS | TEMPERATURE: 98 F | DIASTOLIC BLOOD PRESSURE: 66 MMHG

## 2023-01-12 DIAGNOSIS — Z80.3 FAMILY HISTORY OF BREAST CANCER: Primary | ICD-10-CM

## 2023-01-12 PROCEDURE — 3078F DIAST BP <80 MM HG: CPT | Mod: CPTII,S$GLB,, | Performed by: NURSE PRACTITIONER

## 2023-01-12 PROCEDURE — 1159F PR MEDICATION LIST DOCUMENTED IN MEDICAL RECORD: ICD-10-PCS | Mod: CPTII,S$GLB,, | Performed by: NURSE PRACTITIONER

## 2023-01-12 PROCEDURE — 99999 PR PBB SHADOW E&M-EST. PATIENT-LVL IV: CPT | Mod: PBBFAC,,, | Performed by: NURSE PRACTITIONER

## 2023-01-12 PROCEDURE — 1159F MED LIST DOCD IN RCRD: CPT | Mod: CPTII,S$GLB,, | Performed by: NURSE PRACTITIONER

## 2023-01-12 PROCEDURE — 99214 PR OFFICE/OUTPT VISIT, EST, LEVL IV, 30-39 MIN: ICD-10-PCS | Mod: S$GLB,,, | Performed by: NURSE PRACTITIONER

## 2023-01-12 PROCEDURE — 99999 PR PBB SHADOW E&M-EST. PATIENT-LVL IV: ICD-10-PCS | Mod: PBBFAC,,, | Performed by: NURSE PRACTITIONER

## 2023-01-12 PROCEDURE — 3008F BODY MASS INDEX DOCD: CPT | Mod: CPTII,S$GLB,, | Performed by: NURSE PRACTITIONER

## 2023-01-12 PROCEDURE — 3078F PR MOST RECENT DIASTOLIC BLOOD PRESSURE < 80 MM HG: ICD-10-PCS | Mod: CPTII,S$GLB,, | Performed by: NURSE PRACTITIONER

## 2023-01-12 PROCEDURE — 1160F RVW MEDS BY RX/DR IN RCRD: CPT | Mod: CPTII,S$GLB,, | Performed by: NURSE PRACTITIONER

## 2023-01-12 PROCEDURE — 99214 OFFICE O/P EST MOD 30 MIN: CPT | Mod: S$GLB,,, | Performed by: NURSE PRACTITIONER

## 2023-01-12 PROCEDURE — 3074F SYST BP LT 130 MM HG: CPT | Mod: CPTII,S$GLB,, | Performed by: NURSE PRACTITIONER

## 2023-01-12 PROCEDURE — 3008F PR BODY MASS INDEX (BMI) DOCUMENTED: ICD-10-PCS | Mod: CPTII,S$GLB,, | Performed by: NURSE PRACTITIONER

## 2023-01-12 PROCEDURE — 1160F PR REVIEW ALL MEDS BY PRESCRIBER/CLIN PHARMACIST DOCUMENTED: ICD-10-PCS | Mod: CPTII,S$GLB,, | Performed by: NURSE PRACTITIONER

## 2023-01-12 PROCEDURE — 3074F PR MOST RECENT SYSTOLIC BLOOD PRESSURE < 130 MM HG: ICD-10-PCS | Mod: CPTII,S$GLB,, | Performed by: NURSE PRACTITIONER

## 2023-01-29 NOTE — PROGRESS NOTES
[facility] Note    Chief Complaint      Chief Complaint   Patient presents with    Annual Exam       History of Present Illness      Ghada Manley is a 42 y.o.  WF with IBS, IC, Endometriosis, Stress Urinary Incontinence, Fam h/o Breast CA presents to fu well visit.  Last virtual visit - 9/20/21      Acute Low back Pain -  Resolved.     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      IBS - She avoids Fructose due to Fructose intolerance.  She manages this with diet and stress control.      IC - Fu by Dr. Spencer. Pt on Amitriptyline 10 mg QHS.  Fu by Dr. Lyons.     Stress Incontinence - Pt on Detrol LA.  Fu by Dr. Lyons.     H/o Left ankle sprain - Doing better.  Fu by Ortho, Dr. Yang    Left bicipital tendonitis - Fu by Ptx.  She is on Aleve prn with food.  Monitor for any GI S.E. If persists/worsens rec fu with Ortho for poss Steroid inj.      Family h/o Breast CA - Mom, Mat GM, and Mat Aunt - they were BRCA neg.  Pt lifetime risk 34 % per recent MGM. Referred to [facility] for further discussion about inc testing including poss MRI breast and genetic testing. MGM due next in Nov.  Alt Q 6 mos with MRI. Note - Pt on Lo-Loestrin OCP for Dysmenorrhea.      HCM - Flu - 11/2/22; Tdap - 9/20/17; COVID -19 Vaccine (pfizer) - #1 - 3/15/21 and #2 - 4/6/21; # 3 11/30/21; # 4 11/2/22; MGM - 11/17/22  at [facility];  PAP - 3/1/21 - neg; Uro/GYN - Dr. Spencer; Ob/GYN - Dr. Lopez; Ophtho - Dr. Morgan; Derm - Dr. Cervantes; Ortho - Dr Yang; Well visit - 1/31/23    Patient Care Team:  Tatyana Faustin MD as PCP - General (Internal Medicine)  Melody Lopez MD (Inactive) as Obstetrician (Obstetrics)  Hyun Lyons MD as Consulting Physician (Gynecology)  [facility] (Dermatology)  Dariel Morgan MD as Consulting Physician (Ophthalmology)  Cintia Glass MA as Care Coordinator     Health  Maintenance:  Immunization History   Administered Date(s) Administered    COVID-19, MRNA, LN-S, PF (Pfizer) (Purple Cap) 03/15/2021, 2021, 2021    COVID-19, mRNA, LNP-S, bivalent booster, PF (PFIZER OMICRON) 2022    Influenza 2016    Influenza - Quadrivalent - PF *Preferred* (6 months and older) 2017, 10/15/2018, 10/22/2019, 11/10/2020, 2022    Influenza - Trivalent (ADULT) 2013    Tdap 2017      Health Maintenance   Topic Date Due    Hepatitis C Screening  Never done    Mammogram  2023    TETANUS VACCINE  2027    Lipid Panel  Completed        Past Medical History:  Past Medical History:   Diagnosis Date    Acute right-sided low back pain with right-sided sciatica 2021    Dysmenorrhea     Ectopic pregnancy     Endometriosis     IBS (irritable bowel syndrome)     Interstitial cystitis     Menarche     Age of onset 11    Ocular migraine     Ocular migraine 2021    Urine, incontinence, stress female        Past Surgical History:   has a past surgical history that includes Salpingectomy;  section; and Laparoscopy ().    Family History:  family history includes Breast cancer in her maternal aunt, maternal grandmother, and mother; Diabetes in her father, maternal grandfather, and maternal grandmother; Hypertension in her father and maternal grandfather.     Social History:  Social History     Tobacco Use    Smoking status: Never    Smokeless tobacco: Never   Substance Use Topics    Alcohol use: Yes    Drug use: Never       Review of Systems   Constitutional:  Negative for activity change and unexpected weight change.   HENT:  Negative for hearing loss, rhinorrhea and trouble swallowing.    Eyes:  Negative for discharge and visual disturbance.        Wears glasses.   Respiratory:  Negative for chest tightness and wheezing.    Cardiovascular:  Negative for chest pain and palpitations.   Gastrointestinal:  Negative for blood in stool,  "constipation, diarrhea and vomiting.   Endocrine: Negative for polydipsia and polyuria.   Genitourinary:  Negative for difficulty urinating, dysuria, hematuria and menstrual problem.   Musculoskeletal:  Positive for arthralgias. Negative for joint swelling and neck pain.        She takes Aleve prn. RT shoulder tendonitis - fu by Ptx.    Neurological:  Negative for weakness and headaches.   Psychiatric/Behavioral:  Negative for confusion and dysphoric mood.       Medications:    Current Outpatient Medications:     amitriptyline (ELAVIL) 10 MG tablet, Take 1-2 tabs nightly, Disp: 180 tablet, Rfl: 3    Lactobacillus acidophilus (PROBIOTIC ORAL), Take by mouth., Disp: , Rfl:     LO LOESTRIN FE 1 mg-10 mcg (24)/10 mcg (2) Tab, Take 1 tablet by mouth once daily., Disp: 84 tablet, Rfl: 3    multivitamin (ONE DAILY MULTIVITAMIN) per tablet, Take 1 tablet by mouth once daily., Disp: , Rfl:     psyllium seed, with sugar, (FIBER ORAL), Take by mouth., Disp: , Rfl:     tolterodine (DETROL LA) 4 MG 24 hr capsule, Take 1 capsule (4 mg total) by mouth once daily., Disp: 90 capsule, Rfl: 3     Allergies:  Review of patient's allergies indicates:   Allergen Reactions    Ciprofloxacin (bulk) Anaphylaxis    Codeine Nausea Only       Physical Exam      Vital Signs  Temp: 98.3 °F (36.8 °C)  Temp src: Oral  Pulse: 70  Resp: 16  SpO2: 99 %  BP: 108/74  BP Location: Right arm  Patient Position: Sitting  Pain Score: 0-No pain  Height and Weight  Height: 5' 7" (170.2 cm)  Weight: 63.2 kg (139 lb 5.3 oz)  BSA (Calculated - sq m): 1.73 sq meters  BMI (Calculated): 21.8  Weight in (lb) to have BMI = 25: 159.3      Patient Position: Sitting      Physical Exam  Vitals reviewed.   Constitutional:       General: She is not in acute distress.     Appearance: Normal appearance. She is not ill-appearing, toxic-appearing or diaphoretic.   HENT:      Head: Normocephalic and atraumatic.      Right Ear: Tympanic membrane normal.      Left Ear: Tympanic " membrane normal.   Eyes:      Extraocular Movements: Extraocular movements intact.      Conjunctiva/sclera: Conjunctivae normal.      Pupils: Pupils are equal, round, and reactive to light.   Neck:      Vascular: No carotid bruit.   Cardiovascular:      Rate and Rhythm: Normal rate and regular rhythm.      Pulses: Normal pulses.      Heart sounds: Normal heart sounds.   Pulmonary:      Effort: Pulmonary effort is normal. No respiratory distress.      Breath sounds: Normal breath sounds.   Abdominal:      General: Bowel sounds are normal. There is no distension.      Palpations: Abdomen is soft.      Tenderness: There is no abdominal tenderness. There is no guarding or rebound.   Musculoskeletal:      Cervical back: Neck supple. No tenderness.      Comments: Neg painful arc, + TTP over Left bicipital tendon insertion   Neurological:      General: No focal deficit present.      Mental Status: She is alert and oriented to person, place, and time.   Psychiatric:         Mood and Affect: Mood normal.         Behavior: Behavior normal.        Laboratory:  CBC:  Recent Labs   Lab 08/25/21  0900   WBC 5.24   RBC 3.91 L   Hemoglobin 13.1   Hematocrit 38.5   Platelets 226   MCV 99 H   MCH 33.5 H   MCHC 34.0       CMP:  Recent Labs   Lab 08/25/21  0900 09/28/21  1000   Glucose 78 77   Calcium 9.0 9.3   Albumin 3.7  --    Total Protein 6.6  --    Sodium 136 139   Potassium 4.2 3.9   CO2 20 L 23   Chloride 106 106   BUN 7 8   Creatinine 1.0 1.0   Alkaline Phosphatase 39 L  --    ALT 20  --    AST 25  --    Total Bilirubin 0.7  --            URINALYSIS:  Recent Labs   Lab 07/06/21  1332   Color, UA Straw   Specific Dwarf, UA 1.005   pH, UA 6.0   Protein, UA Negative   Nitrite, UA Negative   Leukocytes, UA Negative        LIPIDS:  Recent Labs   Lab 08/25/21  0900   TSH 2.054   HDL 70   Cholesterol 148   Triglycerides 73   LDL Cholesterol 63.4   HDL/Cholesterol Ratio 47.3   Non-HDL Cholesterol 78   Total Cholesterol/HDL Ratio 2.1        TSH:  Recent Labs   Lab 08/25/21  0900   TSH 2.054         Assessment/Plan     Ghada Manley is a 42 y.o.female with:    Normal physical exam, routine  -     CBC Auto Differential; Future; Expected date: 01/31/2023  -     Comprehensive Metabolic Panel; Future; Expected date: 01/31/2023  -     T4, Free; Future; Expected date: 01/31/2023  -     TSH; Future; Expected date: 01/31/2023  - Performed today.  Will check Basic labs.  RTC in 1 yr for fu or sooner if needed    Interstitial cystitis  - Stable.  Cont current regimen.    Irritable bowel syndrome with both constipation and diarrhea  - Stable.  Cont current regimen.    Biceps tendinitis of left upper extremity  - Fu by Ptx.  She is on Aleve prn with food.  Monitor for any GI S.E. If persists/worsens rec fu with Ortho for poss Steroid inj.     Family history of breast cancer  - Fu in high risk breast clinic. Seh alt MRI and MGM Q 6 mos.     Screening for lipoid disorders  -     Lipid Panel; Future; Expected date: 01/31/2023         Chronic conditions status updated as per HPI.  Other than changes above, cont current medications and maintain follow up with specialists.  Follow up in about 1 year (around 1/31/2024) for well visit or sooner if needed.      Tatyana Faustin MD  [facility]

## 2023-01-31 ENCOUNTER — OFFICE VISIT (OUTPATIENT)
Dept: PRIMARY CARE CLINIC | Facility: CLINIC | Age: 43
End: 2023-01-31
Payer: COMMERCIAL

## 2023-01-31 ENCOUNTER — LAB VISIT (OUTPATIENT)
Dept: LAB | Facility: HOSPITAL | Age: 43
End: 2023-01-31
Attending: INTERNAL MEDICINE
Payer: COMMERCIAL

## 2023-01-31 VITALS
RESPIRATION RATE: 16 BRPM | DIASTOLIC BLOOD PRESSURE: 74 MMHG | HEART RATE: 70 BPM | WEIGHT: 139.31 LBS | TEMPERATURE: 98 F | BODY MASS INDEX: 21.87 KG/M2 | SYSTOLIC BLOOD PRESSURE: 108 MMHG | OXYGEN SATURATION: 99 % | HEIGHT: 67 IN

## 2023-01-31 DIAGNOSIS — Z80.3 FAMILY HISTORY OF BREAST CANCER: ICD-10-CM

## 2023-01-31 DIAGNOSIS — K58.2 IRRITABLE BOWEL SYNDROME WITH BOTH CONSTIPATION AND DIARRHEA: ICD-10-CM

## 2023-01-31 DIAGNOSIS — Z00.00 NORMAL PHYSICAL EXAM, ROUTINE: ICD-10-CM

## 2023-01-31 DIAGNOSIS — Z13.220 SCREENING FOR LIPOID DISORDERS: ICD-10-CM

## 2023-01-31 DIAGNOSIS — N30.10 INTERSTITIAL CYSTITIS: ICD-10-CM

## 2023-01-31 DIAGNOSIS — Z00.00 NORMAL PHYSICAL EXAM, ROUTINE: Primary | ICD-10-CM

## 2023-01-31 DIAGNOSIS — M75.22 BICEPS TENDINITIS OF LEFT UPPER EXTREMITY: ICD-10-CM

## 2023-01-31 PROBLEM — M54.41 ACUTE RIGHT-SIDED LOW BACK PAIN WITH RIGHT-SIDED SCIATICA: Status: RESOLVED | Noted: 2021-07-09 | Resolved: 2023-01-31

## 2023-01-31 PROBLEM — R06.2 WHEEZING: Status: RESOLVED | Noted: 2020-06-08 | Resolved: 2023-01-31

## 2023-01-31 LAB
ALBUMIN SERPL BCP-MCNC: 4.1 G/DL (ref 3.5–5.2)
ALP SERPL-CCNC: 41 U/L (ref 55–135)
ALT SERPL W/O P-5'-P-CCNC: 20 U/L (ref 10–44)
ANION GAP SERPL CALC-SCNC: 8 MMOL/L (ref 8–16)
AST SERPL-CCNC: 25 U/L (ref 10–40)
BASOPHILS # BLD AUTO: 0.06 K/UL (ref 0–0.2)
BASOPHILS NFR BLD: 1.2 % (ref 0–1.9)
BILIRUB SERPL-MCNC: 0.5 MG/DL (ref 0.1–1)
BUN SERPL-MCNC: 10 MG/DL (ref 6–20)
CALCIUM SERPL-MCNC: 9.4 MG/DL (ref 8.7–10.5)
CHLORIDE SERPL-SCNC: 109 MMOL/L (ref 95–110)
CHOLEST SERPL-MCNC: 166 MG/DL (ref 120–199)
CHOLEST/HDLC SERPL: 2.1 {RATIO} (ref 2–5)
CO2 SERPL-SCNC: 21 MMOL/L (ref 23–29)
CREAT SERPL-MCNC: 0.9 MG/DL (ref 0.5–1.4)
DIFFERENTIAL METHOD: ABNORMAL
EOSINOPHIL # BLD AUTO: 0.1 K/UL (ref 0–0.5)
EOSINOPHIL NFR BLD: 1 % (ref 0–8)
ERYTHROCYTE [DISTWIDTH] IN BLOOD BY AUTOMATED COUNT: 12.1 % (ref 11.5–14.5)
EST. GFR  (NO RACE VARIABLE): >60 ML/MIN/1.73 M^2
GLUCOSE SERPL-MCNC: 76 MG/DL (ref 70–110)
HCT VFR BLD AUTO: 40 % (ref 37–48.5)
HDLC SERPL-MCNC: 78 MG/DL (ref 40–75)
HDLC SERPL: 47 % (ref 20–50)
HGB BLD-MCNC: 13.1 G/DL (ref 12–16)
IMM GRANULOCYTES # BLD AUTO: 0.01 K/UL (ref 0–0.04)
IMM GRANULOCYTES NFR BLD AUTO: 0.2 % (ref 0–0.5)
LDLC SERPL CALC-MCNC: 75.4 MG/DL (ref 63–159)
LYMPHOCYTES # BLD AUTO: 1.3 K/UL (ref 1–4.8)
LYMPHOCYTES NFR BLD: 25.5 % (ref 18–48)
MCH RBC QN AUTO: 33.3 PG (ref 27–31)
MCHC RBC AUTO-ENTMCNC: 32.8 G/DL (ref 32–36)
MCV RBC AUTO: 102 FL (ref 82–98)
MONOCYTES # BLD AUTO: 0.3 K/UL (ref 0.3–1)
MONOCYTES NFR BLD: 6.2 % (ref 4–15)
NEUTROPHILS # BLD AUTO: 3.3 K/UL (ref 1.8–7.7)
NEUTROPHILS NFR BLD: 65.9 % (ref 38–73)
NONHDLC SERPL-MCNC: 88 MG/DL
NRBC BLD-RTO: 0 /100 WBC
PLATELET # BLD AUTO: 242 K/UL (ref 150–450)
PMV BLD AUTO: 12.1 FL (ref 9.2–12.9)
POTASSIUM SERPL-SCNC: 4.4 MMOL/L (ref 3.5–5.1)
PROT SERPL-MCNC: 7.2 G/DL (ref 6–8.4)
RBC # BLD AUTO: 3.93 M/UL (ref 4–5.4)
SODIUM SERPL-SCNC: 138 MMOL/L (ref 136–145)
T4 FREE SERPL-MCNC: 0.85 NG/DL (ref 0.71–1.51)
TRIGL SERPL-MCNC: 63 MG/DL (ref 30–150)
TSH SERPL DL<=0.005 MIU/L-ACNC: 2.28 UIU/ML (ref 0.4–4)
WBC # BLD AUTO: 5.02 K/UL (ref 3.9–12.7)

## 2023-01-31 PROCEDURE — 3074F PR MOST RECENT SYSTOLIC BLOOD PRESSURE < 130 MM HG: ICD-10-PCS | Mod: CPTII,S$GLB,, | Performed by: INTERNAL MEDICINE

## 2023-01-31 PROCEDURE — 80061 LIPID PANEL: CPT | Performed by: INTERNAL MEDICINE

## 2023-01-31 PROCEDURE — 3078F DIAST BP <80 MM HG: CPT | Mod: CPTII,S$GLB,, | Performed by: INTERNAL MEDICINE

## 2023-01-31 PROCEDURE — 3008F BODY MASS INDEX DOCD: CPT | Mod: CPTII,S$GLB,, | Performed by: INTERNAL MEDICINE

## 2023-01-31 PROCEDURE — 85025 COMPLETE CBC W/AUTO DIFF WBC: CPT | Performed by: INTERNAL MEDICINE

## 2023-01-31 PROCEDURE — 99396 PR PREVENTIVE VISIT,EST,40-64: ICD-10-PCS | Mod: S$GLB,,, | Performed by: INTERNAL MEDICINE

## 2023-01-31 PROCEDURE — 1160F PR REVIEW ALL MEDS BY PRESCRIBER/CLIN PHARMACIST DOCUMENTED: ICD-10-PCS | Mod: CPTII,S$GLB,, | Performed by: INTERNAL MEDICINE

## 2023-01-31 PROCEDURE — 80053 COMPREHEN METABOLIC PANEL: CPT | Performed by: INTERNAL MEDICINE

## 2023-01-31 PROCEDURE — 1160F RVW MEDS BY RX/DR IN RCRD: CPT | Mod: CPTII,S$GLB,, | Performed by: INTERNAL MEDICINE

## 2023-01-31 PROCEDURE — 99999 PR PBB SHADOW E&M-EST. PATIENT-LVL IV: ICD-10-PCS | Mod: PBBFAC,,, | Performed by: INTERNAL MEDICINE

## 2023-01-31 PROCEDURE — 84439 ASSAY OF FREE THYROXINE: CPT | Performed by: INTERNAL MEDICINE

## 2023-01-31 PROCEDURE — 84443 ASSAY THYROID STIM HORMONE: CPT | Performed by: INTERNAL MEDICINE

## 2023-01-31 PROCEDURE — 36415 COLL VENOUS BLD VENIPUNCTURE: CPT | Mod: PN | Performed by: INTERNAL MEDICINE

## 2023-01-31 PROCEDURE — 99999 PR PBB SHADOW E&M-EST. PATIENT-LVL IV: CPT | Mod: PBBFAC,,, | Performed by: INTERNAL MEDICINE

## 2023-01-31 PROCEDURE — 99396 PREV VISIT EST AGE 40-64: CPT | Mod: S$GLB,,, | Performed by: INTERNAL MEDICINE

## 2023-01-31 PROCEDURE — 3008F PR BODY MASS INDEX (BMI) DOCUMENTED: ICD-10-PCS | Mod: CPTII,S$GLB,, | Performed by: INTERNAL MEDICINE

## 2023-01-31 PROCEDURE — 3074F SYST BP LT 130 MM HG: CPT | Mod: CPTII,S$GLB,, | Performed by: INTERNAL MEDICINE

## 2023-01-31 PROCEDURE — 3078F PR MOST RECENT DIASTOLIC BLOOD PRESSURE < 80 MM HG: ICD-10-PCS | Mod: CPTII,S$GLB,, | Performed by: INTERNAL MEDICINE

## 2023-01-31 PROCEDURE — 1159F MED LIST DOCD IN RCRD: CPT | Mod: CPTII,S$GLB,, | Performed by: INTERNAL MEDICINE

## 2023-01-31 PROCEDURE — 1159F PR MEDICATION LIST DOCUMENTED IN MEDICAL RECORD: ICD-10-PCS | Mod: CPTII,S$GLB,, | Performed by: INTERNAL MEDICINE

## 2023-01-31 RX ORDER — MULTIVITAMIN
1 TABLET ORAL DAILY
COMMUNITY

## 2023-02-01 ENCOUNTER — PATIENT MESSAGE (OUTPATIENT)
Dept: PRIMARY CARE CLINIC | Facility: CLINIC | Age: 43
End: 2023-02-01
Payer: COMMERCIAL

## 2023-02-01 DIAGNOSIS — D75.89 MACROCYTOSIS: Primary | ICD-10-CM

## 2023-02-01 NOTE — PROGRESS NOTES
I sent pt a my chart message -  I reviewed your labs.  Your thyroid functions are normal.   Your Total cholesterol looked good. Your cholesterol (HDL) was high. This is a good thing. Your kidney function and liver functions looked good.  You are not anemic.   Your MCV was slightly high.  Sometimes this can be due to a nutritional deficiency (such as a Vitamin B12 or folate deficiency). Are you on a vegan/vegetarian diet?      Dr. TORIBIO

## 2023-02-03 NOTE — TELEPHONE ENCOUNTER
We can check your Vitamin B12 and folic acid level if you would like.  I will place orders.    Dr. TORIBIO

## 2023-02-10 ENCOUNTER — LAB VISIT (OUTPATIENT)
Dept: LAB | Facility: HOSPITAL | Age: 43
End: 2023-02-10
Attending: INTERNAL MEDICINE
Payer: COMMERCIAL

## 2023-02-10 DIAGNOSIS — D75.89 MACROCYTOSIS: ICD-10-CM

## 2023-02-10 LAB
FOLATE SERPL-MCNC: 34.7 NG/ML (ref 4–24)
VIT B12 SERPL-MCNC: 288 PG/ML (ref 210–950)

## 2023-02-10 PROCEDURE — 82607 VITAMIN B-12: CPT | Performed by: INTERNAL MEDICINE

## 2023-02-10 PROCEDURE — 36415 COLL VENOUS BLD VENIPUNCTURE: CPT | Mod: PN | Performed by: INTERNAL MEDICINE

## 2023-02-10 PROCEDURE — 82746 ASSAY OF FOLIC ACID SERUM: CPT | Performed by: INTERNAL MEDICINE

## 2023-02-15 ENCOUNTER — PATIENT MESSAGE (OUTPATIENT)
Dept: PRIMARY CARE CLINIC | Facility: CLINIC | Age: 43
End: 2023-02-15
Payer: COMMERCIAL

## 2023-02-15 NOTE — PROGRESS NOTES
I sent pt a my chart message -  I reviewed your labs.  Your folic acid was high and not deficient. If you are taking folic acid you can decrease this.  Your Vitamin B12 was slightly low at 288.  I recommend you take OTC Vitamin B12 1000 mcg/day.  Dr. TORIBIO

## 2023-04-17 ENCOUNTER — PATIENT MESSAGE (OUTPATIENT)
Dept: HEMATOLOGY/ONCOLOGY | Facility: CLINIC | Age: 43
End: 2023-04-17
Payer: COMMERCIAL

## 2023-05-05 ENCOUNTER — HOSPITAL ENCOUNTER (OUTPATIENT)
Dept: RADIOLOGY | Facility: HOSPITAL | Age: 43
Discharge: HOME | End: 2023-05-05
Attending: NURSE PRACTITIONER
Payer: COMMERCIAL

## 2023-05-05 DIAGNOSIS — Z80.3 FAMILY HISTORY OF BREAST CANCER: ICD-10-CM

## 2023-05-05 PROCEDURE — 77049 MRI BREAST C-+ W/CAD BI: CPT | Mod: TC

## 2023-05-05 PROCEDURE — 25500020 PHARM REV CODE 255: Performed by: NURSE PRACTITIONER

## 2023-05-05 PROCEDURE — 77049 MRI BREAST C-+ W/CAD BI: CPT | Mod: 26,,, | Performed by: RADIOLOGY

## 2023-05-05 PROCEDURE — A9577 INJ MULTIHANCE: HCPCS | Performed by: NURSE PRACTITIONER

## 2023-05-05 PROCEDURE — 77049 MRI BREAST W/WO CONTRAST, W/CAD, BILATERAL: ICD-10-PCS | Mod: 26,,, | Performed by: RADIOLOGY

## 2023-05-05 RX ADMIN — GADOBENATE DIMEGLUMINE 13 ML: 529 INJECTION, SOLUTION INTRAVENOUS at 10:05

## 2023-06-11 DIAGNOSIS — N30.10 INTERSTITIAL CYSTITIS: ICD-10-CM

## 2023-06-12 ENCOUNTER — PATIENT MESSAGE (OUTPATIENT)
Dept: UROLOGY | Facility: CLINIC | Age: 43
End: 2023-06-12
Payer: COMMERCIAL

## 2023-06-12 ENCOUNTER — TELEPHONE (OUTPATIENT)
Dept: UROLOGY | Facility: CLINIC | Age: 43
End: 2023-06-12
Payer: COMMERCIAL

## 2023-06-12 RX ORDER — TOLTERODINE 4 MG/1
CAPSULE, EXTENDED RELEASE ORAL
Qty: 90 CAPSULE | Refills: 0 | Status: SHIPPED | OUTPATIENT
Start: 2023-06-12 | End: 2023-07-28 | Stop reason: SDUPTHER

## 2023-06-12 NOTE — TELEPHONE ENCOUNTER
Addressed in messages.  ----- Message from Soren Zacarias sent at 6/12/2023  1:08 PM CDT -----  Regarding: CAll BAck  Name of Who is Calling: BRIAN RAYA [[id number]]              What is the request in detail: Pt Requesting a appointment. Please assist              Can the clinic reply by [facility]: No              What Number to Call Back if not in [facility]: [phone]

## 2023-06-14 DIAGNOSIS — Z91.89 AT HIGH RISK FOR BREAST CANCER: ICD-10-CM

## 2023-06-14 DIAGNOSIS — N80.9 ENDOMETRIOSIS: ICD-10-CM

## 2023-06-16 RX ORDER — NORETHINDRONE ACETATE AND ETHINYL ESTRADIOL, ETHINYL ESTRADIOL AND FERROUS FUMARATE 1MG-10(24)
1 KIT ORAL DAILY
Qty: 84 TABLET | Refills: 0 | Status: SHIPPED | OUTPATIENT
Start: 2023-06-16 | End: 2023-08-08 | Stop reason: SDUPTHER

## 2023-07-25 RX ORDER — AMITRIPTYLINE HYDROCHLORIDE 10 MG/1
TABLET, FILM COATED ORAL
Qty: 180 TABLET | Refills: 0 | Status: SHIPPED | OUTPATIENT
Start: 2023-07-25 | End: 2023-07-28 | Stop reason: SDUPTHER

## 2023-07-28 ENCOUNTER — OFFICE VISIT (OUTPATIENT)
Dept: UROGYNECOLOGY | Facility: CLINIC | Age: 43
End: 2023-07-28
Payer: COMMERCIAL

## 2023-07-28 VITALS
DIASTOLIC BLOOD PRESSURE: 72 MMHG | WEIGHT: 137.38 LBS | BODY MASS INDEX: 21.56 KG/M2 | HEIGHT: 67 IN | SYSTOLIC BLOOD PRESSURE: 110 MMHG

## 2023-07-28 DIAGNOSIS — Z01.419 WELL WOMAN EXAM: Primary | ICD-10-CM

## 2023-07-28 DIAGNOSIS — K58.9 IRRITABLE BOWEL SYNDROME, UNSPECIFIED TYPE: ICD-10-CM

## 2023-07-28 DIAGNOSIS — N80.9 ENDOMETRIOSIS: ICD-10-CM

## 2023-07-28 DIAGNOSIS — Z91.89 AT HIGH RISK FOR BREAST CANCER: ICD-10-CM

## 2023-07-28 DIAGNOSIS — N30.10 INTERSTITIAL CYSTITIS: ICD-10-CM

## 2023-07-28 PROCEDURE — 99999 PR PBB SHADOW E&M-EST. PATIENT-LVL III: ICD-10-PCS | Mod: PBBFAC,,, | Performed by: NURSE PRACTITIONER

## 2023-07-28 PROCEDURE — 1159F PR MEDICATION LIST DOCUMENTED IN MEDICAL RECORD: ICD-10-PCS | Mod: CPTII,S$GLB,, | Performed by: NURSE PRACTITIONER

## 2023-07-28 PROCEDURE — 1159F MED LIST DOCD IN RCRD: CPT | Mod: CPTII,S$GLB,, | Performed by: NURSE PRACTITIONER

## 2023-07-28 PROCEDURE — 99396 PREV VISIT EST AGE 40-64: CPT | Mod: S$GLB,,, | Performed by: NURSE PRACTITIONER

## 2023-07-28 PROCEDURE — 99396 PR PREVENTIVE VISIT,EST,40-64: ICD-10-PCS | Mod: S$GLB,,, | Performed by: NURSE PRACTITIONER

## 2023-07-28 PROCEDURE — 3074F SYST BP LT 130 MM HG: CPT | Mod: CPTII,S$GLB,, | Performed by: NURSE PRACTITIONER

## 2023-07-28 PROCEDURE — 3008F PR BODY MASS INDEX (BMI) DOCUMENTED: ICD-10-PCS | Mod: CPTII,S$GLB,, | Performed by: NURSE PRACTITIONER

## 2023-07-28 PROCEDURE — 3078F DIAST BP <80 MM HG: CPT | Mod: CPTII,S$GLB,, | Performed by: NURSE PRACTITIONER

## 2023-07-28 PROCEDURE — 3074F PR MOST RECENT SYSTOLIC BLOOD PRESSURE < 130 MM HG: ICD-10-PCS | Mod: CPTII,S$GLB,, | Performed by: NURSE PRACTITIONER

## 2023-07-28 PROCEDURE — 1160F RVW MEDS BY RX/DR IN RCRD: CPT | Mod: CPTII,S$GLB,, | Performed by: NURSE PRACTITIONER

## 2023-07-28 PROCEDURE — 3008F BODY MASS INDEX DOCD: CPT | Mod: CPTII,S$GLB,, | Performed by: NURSE PRACTITIONER

## 2023-07-28 PROCEDURE — 3078F PR MOST RECENT DIASTOLIC BLOOD PRESSURE < 80 MM HG: ICD-10-PCS | Mod: CPTII,S$GLB,, | Performed by: NURSE PRACTITIONER

## 2023-07-28 PROCEDURE — 99999 PR PBB SHADOW E&M-EST. PATIENT-LVL III: CPT | Mod: PBBFAC,,, | Performed by: NURSE PRACTITIONER

## 2023-07-28 PROCEDURE — 1160F PR REVIEW ALL MEDS BY PRESCRIBER/CLIN PHARMACIST DOCUMENTED: ICD-10-PCS | Mod: CPTII,S$GLB,, | Performed by: NURSE PRACTITIONER

## 2023-07-28 RX ORDER — AMITRIPTYLINE HYDROCHLORIDE 10 MG/1
TABLET, FILM COATED ORAL
Qty: 180 TABLET | Refills: 3 | Status: SHIPPED | OUTPATIENT
Start: 2023-07-28 | End: 2024-07-28

## 2023-07-28 RX ORDER — TOLTERODINE 4 MG/1
4 CAPSULE, EXTENDED RELEASE ORAL DAILY
Qty: 90 CAPSULE | Refills: 3 | Status: SHIPPED | OUTPATIENT
Start: 2023-07-28 | End: 2024-07-28

## 2023-07-28 NOTE — PROGRESS NOTES
2023    SUBJECTIVE:   43 y.o. female for annual exam.    Past Medical History:   Diagnosis Date    Acute right-sided low back pain with right-sided sciatica 2021    Dysmenorrhea     Ectopic pregnancy     Endometriosis     IBS (irritable bowel syndrome)     Interstitial cystitis     Menarche     Age of onset 11    Ocular migraine     Ocular migraine 2021    Urine, incontinence, stress female        Past Surgical History:   Procedure Laterality Date     SECTION      x 2    LAPAROSCOPY  2001    SALPINGECTOMY      ECTOPIC PREGNANCY       Family History   Problem Relation Age of Onset    Diabetes Maternal Grandmother     Breast cancer Maternal Grandmother     Diabetes Maternal Grandfather     Hypertension Maternal Grandfather     Diabetes Father     Hypertension Father     Breast cancer Mother     Breast cancer Maternal Aunt     Colon cancer Neg Hx     Ovarian cancer Neg Hx     Vaginal cancer Neg Hx     Endometrial cancer Neg Hx     Cervical cancer Neg Hx        Social History     Socioeconomic History    Marital status:     Number of children: 2   Tobacco Use    Smoking status: Never    Smokeless tobacco: Never   Substance and Sexual Activity    Alcohol use: Yes    Drug use: Never    Sexual activity: Yes     Birth control/protection: OCP, Partner-Vasectomy       Current Outpatient Medications   Medication Sig Dispense Refill    Lactobacillus acidophilus (PROBIOTIC ORAL) Take by mouth.      LO LOESTRIN FE 1 mg-10 mcg (24)/10 mcg (2) Tab Take 1 tablet by mouth Daily. 84 tablet 0    multivitamin (THERAGRAN) per tablet Take 1 tablet by mouth once daily.      psyllium seed, with sugar, (FIBER ORAL) Take by mouth.      amitriptyline (ELAVIL) 10 MG tablet Take 1-2 tablets nightly 180 tablet 3    tolterodine (DETROL LA) 4 MG 24 hr capsule Take 1 capsule (4 mg total) by mouth once daily. 90 capsule 3     No current facility-administered medications for this visit.       Review of patient's  "allergies indicates:   Allergen Reactions    Ciprofloxacin (bulk) Anaphylaxis    Codeine Nausea Only       No LMP recorded. (Menstrual status: Birth Control).  Continuous ocp's    Well Woman:  Last pap: 2021 normal HPV negative  No h/o abnl paps/STDs.   Last mammogram: 2022 normal   Colonoscopy: , normal per report (hemorrhoids). For IBS.   DEXA: NA    OB History          3    Para   2    Term   2            AB   1    Living   2         SAB        IAB        Ectopic   1    Multiple        Live Births   2                   ROS:  Feeling well.   No dyspnea or chest pain on exertion.    No abdominal pain, change in bowel habits, black or bloody stools.  No major changes.  Takes fiber, probiotic, and amitriptyline  IC-- flare around  (father- in- law )  Taking detrol LA.   GYN ROS: no breast pain or new or enlarging lumps on self exam, no vaginal bleeding.   No neurological complaints.    OBJECTIVE:   The patient appears well, alert, oriented x 3, in no distress.  /72   Ht 5' 7" (1.702 m)   Wt 62.3 kg (137 lb 5.6 oz)   BMI 21.51 kg/m²   ENT normal.  Neck supple. No adenopathy or thyromegaly. CRAMELO.   Normal respiratory effort   Pulse with  regular rate and rhythm.   Abdomen soft without tenderness, guarding, mass or organomegaly.   Extremities show no edema, normal peripheral pulses.   Neurological is normal, no focal findings.    BREAST EXAM: breasts appear normal, no suspicious masses, no skin or nipple changes or axillary nodes    PELVIC EXAM:   VULVA: normal appearing vulva with no masses, tenderness or lesions,   VAGINA: normal appearing vagina with normal color and discharge, no lesions,  CERVIX: normal appearing cervix without discharge or lesions,   UTERUS: uterus is normal size, shape, consistency and nontender,   ADNEXA: no masses,   RECTAL: deferred    ASSESSMENT:   1. Well woman exam        2. Interstitial cystitis  tolterodine (DETROL LA) 4 MG 24 hr capsule    " amitriptyline (ELAVIL) 10 MG tablet      3. At high risk for breast cancer        4. Endometriosis        5. Irritable bowel syndrome, unspecified type              PLAN:     1. Well woman  --pap today  --takes 2-3 weeks for results    2. At high risk breast cancer  --followed by breast surgery    3. History of abnormal uterine bleeding/ endometriosis  --continue ocp's    4.  IBS  --continue fiber + probiotic  --continue avoiding dietary triggers  --continue meditation/relaxation exercises     5. IC  --avoid dietary irritants              --could try prelief before any irritants to help reduce effect  --manage stress/ anxiety  --continue detrol LA 4 mg  --continue amitriptyline 10-20 mg daily; may increase shortterm for flares; call us if would like to send to mail order  --consider cystohydrodistension vs. Bladder instillations in not improved  --consider prelief because cystex worked for her  --consider elmiron in the future if needed  --consider PT in the future     6.  History of stress incontinence:  --Empty bladder every 3 hours.  Empty well: wait a minute, lean forward on toilet.    --KEGELS: do 10 in AM and 10 in PM, holding each x 10 seconds.  When you feel urge to go, STOP, KEGEL, and when urge has passed, then go to bathroom. re.      7. RTC 1 year for annual      30 minutes were spent in face to face time with this patient  90 % of this time was spent in counseling and/or coordination of care    Kelsey DAO [facility]  Division of Female Pelvic Medicine and Reconstructive Surgery  Department of Obstetrics & Gynecology

## 2023-07-28 NOTE — PATIENT INSTRUCTIONS
1. Well woman  --pap today  --takes 2-3 weeks for results    2. At high risk breast cancer  --followed by breast surgery    3. History of abnormal uterine bleeding/ endometriosis  --continue ocp's    4.  IBS  --continue fiber + probiotic  --continue avoiding dietary triggers  --continue meditation/relaxation exercises     5. IC  --avoid dietary irritants              --could try prelief before any irritants to help reduce effect  --manage stress/ anxiety  --continue detrol LA 4 mg  --continue amitriptyline 10-20 mg daily; may increase shortterm for flares; call us if would like to send to mail order  --consider cystohydrodistension vs. Bladder instillations in not improved  --consider prelief because cystex worked for her  --consider elmiron in the future if needed  --consider PT in the future     6.  History of stress incontinence:  --Empty bladder every 3 hours.  Empty well: wait a minute, lean forward on toilet.    --KEGELS: do 10 in AM and 10 in PM, holding each x 10 seconds.  When you feel urge to go, STOP, KEGEL, and when urge has passed, then go to bathroom. re.      7. RTC 1 year for annual

## 2023-08-08 ENCOUNTER — PATIENT MESSAGE (OUTPATIENT)
Dept: UROGYNECOLOGY | Facility: CLINIC | Age: 43
End: 2023-08-08
Payer: COMMERCIAL

## 2023-08-08 DIAGNOSIS — N80.9 ENDOMETRIOSIS: ICD-10-CM

## 2023-08-08 DIAGNOSIS — Z91.89 AT HIGH RISK FOR BREAST CANCER: ICD-10-CM

## 2023-08-08 RX ORDER — NORETHINDRONE ACETATE AND ETHINYL ESTRADIOL, ETHINYL ESTRADIOL AND FERROUS FUMARATE 1MG-10(24)
1 KIT ORAL DAILY
Qty: 84 TABLET | Refills: 3 | Status: SHIPPED | OUTPATIENT
Start: 2023-08-08 | End: 2023-12-21 | Stop reason: SDUPTHER

## 2023-12-04 ENCOUNTER — PATIENT MESSAGE (OUTPATIENT)
Dept: HEMATOLOGY/ONCOLOGY | Facility: CLINIC | Age: 43
End: 2023-12-04
Payer: COMMERCIAL

## 2023-12-21 ENCOUNTER — PATIENT MESSAGE (OUTPATIENT)
Dept: UROGYNECOLOGY | Facility: CLINIC | Age: 43
End: 2023-12-21
Payer: COMMERCIAL

## 2023-12-21 DIAGNOSIS — N80.9 ENDOMETRIOSIS: ICD-10-CM

## 2023-12-21 DIAGNOSIS — Z91.89 AT HIGH RISK FOR BREAST CANCER: ICD-10-CM

## 2023-12-21 RX ORDER — NORETHINDRONE ACETATE AND ETHINYL ESTRADIOL, ETHINYL ESTRADIOL AND FERROUS FUMARATE 1MG-10(24)
1 KIT ORAL DAILY
Qty: 84 TABLET | Refills: 3 | Status: SHIPPED | OUTPATIENT
Start: 2023-12-21 | End: 2024-02-22

## 2023-12-22 ENCOUNTER — HOSPITAL ENCOUNTER (OUTPATIENT)
Dept: RADIOLOGY | Facility: HOSPITAL | Age: 43
Discharge: HOME | End: 2023-12-22
Attending: NURSE PRACTITIONER
Payer: COMMERCIAL

## 2023-12-22 VITALS — BODY MASS INDEX: 21.19 KG/M2 | WEIGHT: 135 LBS | HEIGHT: 67 IN

## 2023-12-22 DIAGNOSIS — Z80.3 FAMILY HISTORY OF BREAST CANCER: ICD-10-CM

## 2023-12-22 PROCEDURE — 77063 BREAST TOMOSYNTHESIS BI: CPT | Mod: 26,,, | Performed by: RADIOLOGY

## 2023-12-22 PROCEDURE — 77067 MAMMO DIGITAL SCREENING BILAT WITH TOMO: ICD-10-PCS | Mod: 26,,, | Performed by: RADIOLOGY

## 2023-12-22 PROCEDURE — 77067 SCR MAMMO BI INCL CAD: CPT | Mod: TC

## 2023-12-22 PROCEDURE — 77063 MAMMO DIGITAL SCREENING BILAT WITH TOMO: ICD-10-PCS | Mod: 26,,, | Performed by: RADIOLOGY

## 2023-12-22 PROCEDURE — 77067 SCR MAMMO BI INCL CAD: CPT | Mod: 26,,, | Performed by: RADIOLOGY

## 2023-12-27 ENCOUNTER — TELEPHONE (OUTPATIENT)
Dept: UROGYNECOLOGY | Facility: CLINIC | Age: 43
End: 2023-12-27
Payer: COMMERCIAL

## 2023-12-27 NOTE — TELEPHONE ENCOUNTER
Duplicate.    ----- Message from Carmen Maguire sent at 12/27/2023  3:33 PM CST -----      Name of Who is Calling: BRIAN RAYA [[id number]]      What is the request in detail: [facility] called regarding script LO LOESTRIN FE 1 mg-10 mcg (24)/10 mcg (2) Tab      Can the clinic reply by [facility]: Y      What Number to Call Back if not in [facility]: [facility] [phone] reference [id number]

## 2024-01-10 PROBLEM — Z91.89 AT HIGH RISK FOR BREAST CANCER: Status: ACTIVE | Noted: 2024-01-10

## 2024-01-10 PROBLEM — R92.333 HETEROGENEOUSLY DENSE TISSUE OF BOTH BREASTS ON MAMMOGRAPHY: Status: ACTIVE | Noted: 2024-01-10

## 2024-01-10 NOTE — PROGRESS NOTES
Reason For Follow Up: Increased lifetime risk of breast cancer      HPI:   Ghada Manley presents for a follow up of increased risk of breast cancer. Her most recent screening mammogram on 11/17/22 and her most recent MRI was on 4/27/22.      Today, Feels good and no complaints.   No breast concerns. Denies changes to breast health. Denies skin changes and nipple discharge.  Denies changes to family history.   Denies smoking, alcohol use 4-6 glasses week, exercise 40 minutes 5 days weeks.  Follows up with GYN for a 2nd breast exam year    High Risk Breast cancer specific history:  - See original consultation note in high risk breast clinic from 10/13/21.         SEE CALCULATED RISK BELOW.     Past Medical   Past Medical History:   Diagnosis Date    Acute right-sided low back pain with right-sided sciatica 7/9/2021    Dysmenorrhea     Ectopic pregnancy     Endometriosis     IBS (irritable bowel syndrome)     Interstitial cystitis     Menarche     Age of onset 11    Ocular migraine     Ocular migraine 09/20/2021    Urine, incontinence, stress female      Patient Active Problem List   Diagnosis    Family history of breast cancer    Interstitial cystitis    Irritable bowel syndrome    Endometriosis    Tinnitus of left ear    Suspected COVID-19 virus infection    Ocular migraine    Screening mammogram, encounter for    At high risk for breast cancer    Heterogeneously dense tissue of both breasts on mammography     Social History   Social History     Tobacco Use    Smoking status: Never    Smokeless tobacco: Never   Substance Use Topics    Alcohol use: Yes    Drug use: Never     Family History  Family History   Problem Relation Age of Onset    Diabetes Maternal Grandmother     Breast cancer Maternal Grandmother 70    Diabetes Maternal Grandfather     Hypertension Maternal Grandfather     Diabetes Father     Hypertension Father     Breast cancer Mother 61    Breast cancer Maternal Aunt 28    Colon cancer Neg Hx      "Ovarian cancer Neg Hx     Vaginal cancer Neg Hx     Endometrial cancer Neg Hx     Cervical cancer Neg Hx      Medications    Current Outpatient Medications:     amitriptyline (ELAVIL) 10 MG tablet, Take 1-2 tablets nightly, Disp: 180 tablet, Rfl: 3    Lactobacillus acidophilus (PROBIOTIC ORAL), Take by mouth., Disp: , Rfl:     LO LOESTRIN FE 1 mg-10 mcg (24)/10 mcg (2) Tab, Take 1 tablet by mouth Daily., Disp: 84 tablet, Rfl: 3    multivitamin (THERAGRAN) per tablet, Take 1 tablet by mouth once daily., Disp: , Rfl:     psyllium seed, with sugar, (FIBER ORAL), Take by mouth., Disp: , Rfl:     tolterodine (DETROL LA) 4 MG 24 hr capsule, Take 1 capsule (4 mg total) by mouth once daily., Disp: 90 capsule, Rfl: 3  Allergies  Review of patient's allergies indicates:   Allergen Reactions    Ciprofloxacin (bulk) Anaphylaxis    Codeine Nausea Only       Review of Systems       See above   Review of Systems  Review of Systems   Constitutional:  Negative for appetite change and unexpected weight change.   HENT:  Negative for mouth sores.    Eyes:  Negative for visual disturbance.   Respiratory:  Negative for cough and shortness of breath.    Cardiovascular:  Negative for chest pain.   Gastrointestinal:  Negative for abdominal pain and diarrhea.   Genitourinary:  Negative for frequency.   Musculoskeletal:  Negative for back pain.   Skin:  Negative for rash.   Neurological:  Negative for headaches.   Hematological:  Negative for adenopathy.   Psychiatric/Behavioral:  The patient is not nervous/anxious.      All other systems reviewed and are negative.    Objective:      Vitals:   Vitals:    01/24/24 1337   BP: 118/73   Pulse: 87   Resp: 18   Temp: 98.5 °F (36.9 °C)   TempSrc: Oral   SpO2: 100%   Weight: 63.6 kg (140 lb 3.4 oz)   Height: 5' 7" (1.702 m)       BMI: Body mass index is 21.96 kg/m².   Body surface area is 1.73 meters squared.    Physical Exam  Vitals and nursing note reviewed.   Constitutional:       General: She is " not in acute distress.     Appearance: Normal appearance. She is well-developed.   HENT:      Head: Normocephalic.   Cardiovascular:      Rate and Rhythm: Normal rate and regular rhythm.      Heart sounds: Normal heart sounds.   Pulmonary:      Effort: Pulmonary effort is normal.      Breath sounds: Normal breath sounds.   Chest:   Breasts:     Right: Normal.      Left: Normal.   Musculoskeletal:      Cervical back: Normal range of motion.   Lymphadenopathy:      Cervical: No cervical adenopathy.      Upper Body:      Right upper body: No supraclavicular or axillary adenopathy.      Left upper body: No supraclavicular or axillary adenopathy.   Skin:     General: Skin is warm and dry.      Findings: No rash.   Neurological:      Mental Status: She is alert and oriented to person, place, and time.   Psychiatric:         Behavior: Behavior normal.         Laboratory Data: reviewed most recent   Imaging: reviewed most recent        Assessment:     1. Family history of breast cancer    2. At high risk for breast cancer    3. Heterogeneously dense tissue of both breasts on mammography        1. Increased risk of breast cancer   * Tyrer-Cuzick (TC) lifetime risk of 27.5%. We discussed that TC score will categorize your lifetime risk of being diagnosed with breast cancer. Categories are as such: Average risk <15%, Intermediate risk 15-19%, and High risk > or = to 20%.    * The Lolis Model for Breast Cancer risk: She has a 1.3% 5-year breast cancer risk (Compared with 0.7% for the average 44 y.o. woman) and 18.5% Lifetime breast cancer risk (Compared with 12.2% for the average 44 y.o. woman). A woman's risk is considered low if her five-year risk of developing breast cancer is less than 1.6%; it is considered high if she scores above 1.66%. (All women who are over 60 have a score of at least 1.66 and are considered high risk, based on the Lolis Model.)      Risk factors are categorized into 2 groups: Modifiable and  Non-modifiable. Modifiable risk factors include use of hormones, alcohol, smoking, diet and exercise. Non-modifiable risk factors include breast density, genetics, chest radiation, previous pregnancies, age of first period, and age of menopause.    * For women at high risk for breast cancer, endocrine therapy can reduce the risk of invasive and/or in situ breast cancers. (tamoxifen for premenopausal or postmenopausal women and raloxifene or exemestane for postmenopausal women).   * Discussed that Tamoxifen 20 mg daily for 5 years has shown to reduce risk of breast cancer by 49%. At current, there is not adequate data to recommend longer courses of therapy more than 5 years for risk reduction.      * Reviewed risks of Tamoxifen side effects include hot flashes, invasive endometrial cancer in women > 49 years of age (2.3/1000 compared to 0.9/1000), cataracts, increased risk of pulmonary embolism among others.    * Reviewed Lifestyle modifications which have shown benefit:  Limit alcohol consumption to less than 1 drink per day (1 ounce liquor, 6 oz wine, 8 oz beer)  Avoid smoking.  Exercise at least 150 minutes per week of moderate intensity aerobic activity or at least 75 minutes of vigorous activity. Exercise can lower the relative risk of breast cancer by ~18-20%.  Maintain healthy weight and avoid post-menopausal weight gain. Avoid processed foods and eat more lean proteins, fruits and vegetables.       Plan:     Patient has opted out chemoprevention but will call in the future if she wishes to pursue.   Patient elects to proceed with alternating annual mammogram and annual breast MRI along with semiannual CBEs.  Patient will follow up with PCP or GYN for one semiannual CBE along with annual mammogram in  December 2024 and will RTC here for one semiannual CBE along with annual breast MRI in June 2024.  Lifestyle modifications as detailed above.   5.   Encouraged breast awareness, including monthly breast  self-exams.     RTC in 1 year to see me either at [facility] or on 3rd floor.    Questions were encouraged and answered to patient's satisfaction, and patient verbalized understanding of information and agreement with the plan. Advised patient to RTC with any interval changes or concerns.        Patient is in agreement with the proposed treatment plan. All questions were answered to the patient's satisfaction. Patient knows to call clinic for any new or worsening symptoms and if anything is needed before the next clinic visit.          Vira Bunch, NELYP-C  Hematology & Medical Oncology   [facility] [place] [address]  ph. [phone]  Fax. [phone]    Collaborating physician, Dr. Flynn    Total time spent with the patient: 20 minutes, 10 minutes of face to face consultation and 10 minutes of chart review and coordination of care, on the day of the visit. This includes face to face time and non-face to face time preparing to see the patient (eg, review of tests), obtaining and/or reviewing separately obtained history, documenting clinical information in the electronic or other health record, independently interpreting resultsand communicating results to the patient/family/caregiver, or care coordination.    Route Chart for Scheduling    Med Onc Chart Routing      Follow up with physician    Follow up with [facility] 1 year.   Infusion scheduling note    Injection scheduling note    Labs    Imaging Mammogram and MRI   MRI due in June, mammo due in December   Pharmacy appointment    Other referrals                         '

## 2024-01-24 ENCOUNTER — OFFICE VISIT (OUTPATIENT)
Dept: HEMATOLOGY/ONCOLOGY | Facility: CLINIC | Age: 44
End: 2024-01-24
Payer: COMMERCIAL

## 2024-01-24 VITALS
HEIGHT: 67 IN | SYSTOLIC BLOOD PRESSURE: 118 MMHG | WEIGHT: 140.19 LBS | DIASTOLIC BLOOD PRESSURE: 73 MMHG | RESPIRATION RATE: 18 BRPM | BODY MASS INDEX: 22 KG/M2 | TEMPERATURE: 99 F | HEART RATE: 87 BPM | OXYGEN SATURATION: 100 %

## 2024-01-24 DIAGNOSIS — Z91.89 AT HIGH RISK FOR BREAST CANCER: ICD-10-CM

## 2024-01-24 DIAGNOSIS — R92.333 HETEROGENEOUSLY DENSE TISSUE OF BOTH BREASTS ON MAMMOGRAPHY: ICD-10-CM

## 2024-01-24 DIAGNOSIS — Z80.3 FAMILY HISTORY OF BREAST CANCER: Primary | ICD-10-CM

## 2024-01-24 PROCEDURE — 3078F DIAST BP <80 MM HG: CPT | Mod: CPTII,S$GLB,, | Performed by: NURSE PRACTITIONER

## 2024-01-24 PROCEDURE — 1159F MED LIST DOCD IN RCRD: CPT | Mod: CPTII,S$GLB,, | Performed by: NURSE PRACTITIONER

## 2024-01-24 PROCEDURE — 1160F RVW MEDS BY RX/DR IN RCRD: CPT | Mod: CPTII,S$GLB,, | Performed by: NURSE PRACTITIONER

## 2024-01-24 PROCEDURE — 99999 PR PBB SHADOW E&M-EST. PATIENT-LVL V: CPT | Mod: PBBFAC,,, | Performed by: NURSE PRACTITIONER

## 2024-01-24 PROCEDURE — 3008F BODY MASS INDEX DOCD: CPT | Mod: CPTII,S$GLB,, | Performed by: NURSE PRACTITIONER

## 2024-01-24 PROCEDURE — 99214 OFFICE O/P EST MOD 30 MIN: CPT | Mod: S$GLB,,, | Performed by: NURSE PRACTITIONER

## 2024-01-24 PROCEDURE — 3074F SYST BP LT 130 MM HG: CPT | Mod: CPTII,S$GLB,, | Performed by: NURSE PRACTITIONER

## 2024-01-31 ENCOUNTER — PATIENT MESSAGE (OUTPATIENT)
Dept: HEMATOLOGY/ONCOLOGY | Facility: CLINIC | Age: 44
End: 2024-01-31
Payer: COMMERCIAL

## 2024-02-21 DIAGNOSIS — Z91.89 AT HIGH RISK FOR BREAST CANCER: ICD-10-CM

## 2024-02-21 DIAGNOSIS — N80.9 ENDOMETRIOSIS: ICD-10-CM

## 2024-02-22 ENCOUNTER — PATIENT MESSAGE (OUTPATIENT)
Dept: UROGYNECOLOGY | Facility: CLINIC | Age: 44
End: 2024-02-22
Payer: COMMERCIAL

## 2024-02-22 DIAGNOSIS — Z91.89 AT HIGH RISK FOR BREAST CANCER: ICD-10-CM

## 2024-02-22 DIAGNOSIS — N80.9 ENDOMETRIOSIS: ICD-10-CM

## 2024-02-22 RX ORDER — NORETHINDRONE ACETATE AND ETHINYL ESTRADIOL, ETHINYL ESTRADIOL AND FERROUS FUMARATE 1MG-10(24)
1 KIT ORAL
Qty: 84 TABLET | Refills: 3 | Status: SHIPPED | OUTPATIENT
Start: 2024-02-22 | End: 2024-03-01 | Stop reason: SDUPTHER

## 2024-02-29 ENCOUNTER — TELEPHONE (OUTPATIENT)
Dept: UROGYNECOLOGY | Facility: CLINIC | Age: 44
End: 2024-02-29
Payer: COMMERCIAL

## 2024-02-29 NOTE — TELEPHONE ENCOUNTER
----- Message from Jamal Glass sent at 2/29/2024  9:05 AM CST -----  Name of Who is Calling:Petey from [employer]                 What is the request in detail: Calling because the LO LOESTRIN FE 1 mg-10 mcg (24)/10 mcg (2) Tab isn't covered under plan but they have two other options. Sadie /lashell Cheney 120 are Sadie Cheney 1/20.Please call back to further assist.        States you can call and do a verbal          Can the clinic reply by [facility]: No                What Number to Call Back if not in [facility]: [phone]/ REF# [id number]

## 2024-02-29 NOTE — TELEPHONE ENCOUNTER
Sukumar,  What is the request in detail: Calling because the LO LOESTRIN FE 1 mg-10 mcg (24)/10 mcg (2) Tab isn't covered under plan but they have two other options. Sadie /lashell Cheney 120 are Sadie Cheney 1/20.Please call back to further assist.     Please advise

## 2024-02-29 NOTE — TELEPHONE ENCOUNTER
Spoke with [employer] - approval #:  [id number] for Jessica PETERSON. Starting 1/30/2024 until 2/28/2025.

## 2024-03-01 RX ORDER — NORETHINDRONE ACETATE AND ETHINYL ESTRADIOL, ETHINYL ESTRADIOL AND FERROUS FUMARATE 1MG-10(24)
1 KIT ORAL DAILY
Qty: 84 TABLET | Refills: 4 | Status: SHIPPED | OUTPATIENT
Start: 2024-03-01 | End: 2025-03-01

## 2024-03-17 NOTE — PROGRESS NOTES
[facility] Note    Chief Complaint      Chief Complaint   Patient presents with    Annual Exam    Sinusitis       History of Present Illness      Ghada Manley is a 43 y.o. WF with IBS, IC, Endometriosis, Stress Urinary Incontinence, Fam h/o Breast CA presents to fu well visit.  Last virtual visit - 1/31/23    Macrocytosis/Vit B12 def - Vitamin B12 was slightly low at 288. I recommend you take OTC Vitamin B12 1000 mcg/day. MCV was slightly high.  Folic acid was high and not deficient. Repeat B12 level.      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      IBS - She avoids Fructose due to Fructose intolerance.  She manages this with diet and stress control. Good most of the time.      IC - Fu by Dr. Spencer. Pt on Amitriptyline 10 mg QHS.  Fu by Dr. Lyons. Doing well.      Stress Incontinence - Pt on Detrol LA.  Fu by Dr. Lyons. Doing well.      Left bicipital tendonitis - Prev fu by Ptx. Doing well with Sttrength training.  She is on Aleve prn with food.  Monitor for any GI S.E. If persists/worsens rec fu with Ortho for poss Steroid inj.      Family h/o Breast CA - Mom, Mat GM, and Mat Aunt - they were BRCA neg.  Pt lifetime risk 34 % per recent MGM. Referred to High risk Breast Clinic for further discussion about inc testing including poss MRI breast and genetic testing. MGM due next in Nov.  Alt Q 6 mos with MRI. Note - Pt on Lo-Loestrin OCP for Dysmenorrhea.      HCM - Flu - none; Tdap - 9/20/17; COVID -19 Vaccine (pfizer) - #1 - 3/15/21 and #2 - 4/6/21; # 3 11/30/21; # 4 11/2/22; # 5 ; MGM 12/22/23 - repeat 1 yr; alt with mri breast sched for May;  PAP - 3/1/21 - neg; Hep C screen - due - will order;  HIV screen - none - declines; C-scope - due at 44 y.o. (May); Uro/GYN - Dr. Spencer; Ob/GYN - Dr. Bray; Ophtho - Dr. Morgan; Derm - Dr. Cervantes; Ortho - Dr Yang; Well visit - 1/31/23    Patient Care Team:  Tatyana Faustin MD as PCP -  General (Internal Medicine)  Melody Lopez MD (Inactive) as Obstetrician (Obstetrics)  Hyun Lyons MD as Consulting Physician (Gynecology)  Billy Garcia (Dermatology)  Dariel Morgan MD as Consulting Physician (Ophthalmology)  Cintia Glass MA as Care Coordinator     Health Maintenance:  Immunization History   Administered Date(s) Administered    COVID-19, MRNA, LN-S, PF (Pfizer) (Purple Cap) 03/15/2021, 2021, 2021    COVID-19, mRNA, LNP-S, bivalent booster, PF (PFIZER OMICRON) 2022    Influenza 2016    Influenza - Quadrivalent - PF *Preferred* (6 months and older) 2017, 10/15/2018, 10/22/2019, 11/10/2020, 2022    Influenza - Trivalent (ADULT) 2013    Tdap 2017      Health Maintenance   Topic Date Due    Hepatitis C Screening  Never done    Mammogram  2024    TETANUS VACCINE  2027    Lipid Panel  Completed        Past Medical History:  Past Medical History:   Diagnosis Date    Acute right-sided low back pain with right-sided sciatica 2021    Dysmenorrhea     Ectopic pregnancy     Endometriosis     IBS (irritable bowel syndrome)     Interstitial cystitis     Menarche     Age of onset 11    Ocular migraine     Ocular migraine 2021    Urine, incontinence, stress female        Past Surgical History:   has a past surgical history that includes Salpingectomy;  section; and Laparoscopy ().    Family History:  family history includes Asthma in her mother; Breast cancer (age of onset: 28) in her maternal aunt; Breast cancer (age of onset: 61) in her mother; Breast cancer (age of onset: 70) in her maternal grandmother; Cancer in her mother; Diabetes in her father, maternal grandfather, and maternal grandmother; Hypertension in her father and maternal grandfather; Stroke in her mother.     Social History:  Social History     Tobacco Use    Smoking status: Never    Smokeless tobacco: Never   Substance Use Topics     Alcohol use: Yes     Alcohol/week: 4.0 - 6.0 standard drinks of alcohol     Types: 4 - 6 Glasses of wine per week     Comment: 4-6/wk - max    Drug use: Never       Review of Systems   Constitutional:  Negative for activity change and unexpected weight change.   HENT:  Positive for nasal congestion and rhinorrhea. Negative for hearing loss and trouble swallowing.         Takes flonase and can add zyrtec.   Eyes:  Negative for discharge and visual disturbance.   Respiratory:  Negative for cough, chest tightness and wheezing.    Cardiovascular:  Negative for chest pain and palpitations.   Gastrointestinal:  Negative for blood in stool, constipation, diarrhea and vomiting.   Endocrine: Negative for polydipsia and polyuria.   Genitourinary:  Negative for difficulty urinating, dysuria, hematuria, menstrual problem and vaginal bleeding.   Musculoskeletal:  Negative for arthralgias, joint swelling and neck pain.   Neurological:  Negative for weakness and headaches.   Psychiatric/Behavioral:  Negative for confusion and dysphoric mood.         Medications:    Current Outpatient Medications:     amitriptyline (ELAVIL) 10 MG tablet, Take 1-2 tablets nightly, Disp: 180 tablet, Rfl: 3    Lactobacillus acidophilus (PROBIOTIC ORAL), Take by mouth., Disp: , Rfl:     LO LOESTRIN FE 1 mg-10 mcg (24)/10 mcg (2) Tab, Take 1 tablet by mouth Daily., Disp: 84 tablet, Rfl: 4    multivitamin (THERAGRAN) per tablet, Take 1 tablet by mouth once daily., Disp: , Rfl:     psyllium seed, with sugar, (FIBER ORAL), Take by mouth., Disp: , Rfl:     tolterodine (DETROL LA) 4 MG 24 hr capsule, Take 1 capsule (4 mg total) by mouth once daily., Disp: 90 capsule, Rfl: 3     Allergies:  Review of patient's allergies indicates:   Allergen Reactions    Ciprofloxacin (bulk) Anaphylaxis    Codeine Nausea Only       Physical Exam      Vital Signs  Temp: 98 °F (36.7 °C)  Temp Source: Oral  Pulse: 84  SpO2: 100 %  BP: 110/78  BP Location: Right arm  Patient  "Position: Sitting  Pain Score: 0-No pain  Height and Weight  Height: 5' 7" (170.2 cm)  Weight: 63.5 kg (139 lb 15.9 oz)  BSA (Calculated - sq m): 1.73 sq meters  BMI (Calculated): 21.9  Weight in (lb) to have BMI = 25: 159.3      Patient Position: Sitting      Physical Exam  Vitals reviewed.   Constitutional:       General: She is not in acute distress.     Appearance: Normal appearance. She is not ill-appearing, toxic-appearing or diaphoretic.   HENT:      Head: Normocephalic and atraumatic.      Right Ear: Tympanic membrane normal.      Left Ear: Tympanic membrane normal.   Eyes:      Extraocular Movements: Extraocular movements intact.      Conjunctiva/sclera: Conjunctivae normal.      Pupils: Pupils are equal, round, and reactive to light.   Neck:      Vascular: No carotid bruit.   Cardiovascular:      Rate and Rhythm: Normal rate and regular rhythm.      Pulses: Normal pulses.      Heart sounds: Normal heart sounds.   Pulmonary:      Effort: Pulmonary effort is normal. No respiratory distress.      Breath sounds: Normal breath sounds.   Abdominal:      General: Bowel sounds are normal. There is no distension.      Palpations: Abdomen is soft.      Tenderness: There is no abdominal tenderness. There is no guarding or rebound.   Musculoskeletal:      Cervical back: Neck supple. No tenderness.   Neurological:      General: No focal deficit present.      Mental Status: She is alert and oriented to person, place, and time.   Psychiatric:         Mood and Affect: Mood normal.         Behavior: Behavior normal.          Laboratory:  CBC:  Recent Labs   Lab 08/25/21  0900 01/31/23  1003   WBC 5.24 5.02   RBC 3.91 L 3.93 L   Hemoglobin 13.1 13.1   Hematocrit 38.5 40.0   Platelets 226 242   MCV 99 H 102 H   MCH 33.5 H 33.3 H   MCHC 34.0 32.8       CMP:  Recent Labs   Lab 08/25/21  0900 09/28/21  1000 01/31/23  1003   Glucose 78   < > 76   Calcium 9.0   < > 9.4   Albumin 3.7  --  4.1   Total Protein 6.6  --  7.2   Sodium " 136   < > 138   Potassium 4.2   < > 4.4   CO2 20 L   < > 21 L   Chloride 106   < > 109   BUN 7   < > 10   Creatinine 1.0   < > 0.9   Alkaline Phosphatase 39 L  --  41 L   ALT 20  --  20   AST 25  --  25   Total Bilirubin 0.7  --  0.5    < > = values in this interval not displayed.       URINALYSIS:  Recent Labs   Lab 07/06/21  1332   Color, UA Straw   Specific [place], UA 1.005   pH, UA 6.0   Protein, UA Negative   Nitrite, UA Negative   Leukocytes, UA Negative        LIPIDS:  Recent Labs   Lab 08/25/21  0900 01/31/23  1003   TSH 2.054 2.283   HDL 70 78 H   Cholesterol 148 166   Triglycerides 73 63   LDL Cholesterol 63.4 75.4   HDL/Cholesterol Ratio 47.3 47.0   Non-HDL Cholesterol 78 88   Total Cholesterol/HDL Ratio 2.1 2.1       TSH:  Recent Labs   Lab 08/25/21  0900 01/31/23  1003   TSH 2.054 2.283          Other:   Recent Labs   Lab 02/10/23  0850   Vitamin B-12 288           Assessment/Plan     Ghada Manley is a 43 y.o.female with:    Normal physical exam, routine  -     CBC Auto Differential; Future; Expected date: 03/19/2024  -     Comprehensive Metabolic Panel; Future; Expected date: 03/19/2024  -     TSH; Future; Expected date: 03/19/2024  - Performed today.  Will check Basic labs.  RTC in 1 yr for fu or sooner if needed    Macrocytosis  -     Vitamin B12; Future; Expected date: 03/19/2024  -Repeat vit B12.     Vitamin B12 deficiency  -     Vitamin B12; Future; Expected date: 03/19/2024  - Repeat B12. Cont suppl.     Interstitial cystitis  - Stable.  Cont current regimen.    Irritable bowel syndrome with both constipation and diarrhea  - Stable.  Cont current regimen.    Need for hepatitis C screening test  -     Hepatitis C Antibody; Future; Expected date: 03/19/2024    Screening for lipoid disorders  -     Lipid Panel; Future; Expected date: 03/19/2024    Screening for colorectal cancer  -     Ambulatory referral/consult to Endo Procedure ; Future; Expected date: 05/09/2024         Chronic  conditions status updated as per HPI.  Other than changes above, cont current medications and maintain follow up with specialists.    Follow up in about 1 year (around 3/19/2025) for well visit or sooner if needed.      Tatyana Faustin MD  [facility]

## 2024-03-19 ENCOUNTER — LAB VISIT (OUTPATIENT)
Dept: LAB | Facility: HOSPITAL | Age: 44
End: 2024-03-19
Attending: INTERNAL MEDICINE
Payer: COMMERCIAL

## 2024-03-19 ENCOUNTER — OFFICE VISIT (OUTPATIENT)
Dept: PRIMARY CARE CLINIC | Facility: CLINIC | Age: 44
End: 2024-03-19
Payer: COMMERCIAL

## 2024-03-19 VITALS
BODY MASS INDEX: 21.97 KG/M2 | SYSTOLIC BLOOD PRESSURE: 110 MMHG | WEIGHT: 140 LBS | HEART RATE: 84 BPM | TEMPERATURE: 98 F | DIASTOLIC BLOOD PRESSURE: 78 MMHG | HEIGHT: 67 IN | OXYGEN SATURATION: 100 %

## 2024-03-19 DIAGNOSIS — Z12.11 SCREENING FOR COLORECTAL CANCER: ICD-10-CM

## 2024-03-19 DIAGNOSIS — K58.2 IRRITABLE BOWEL SYNDROME WITH BOTH CONSTIPATION AND DIARRHEA: ICD-10-CM

## 2024-03-19 DIAGNOSIS — E53.8 VITAMIN B12 DEFICIENCY: ICD-10-CM

## 2024-03-19 DIAGNOSIS — Z11.59 NEED FOR HEPATITIS C SCREENING TEST: ICD-10-CM

## 2024-03-19 DIAGNOSIS — Z00.00 NORMAL PHYSICAL EXAM, ROUTINE: ICD-10-CM

## 2024-03-19 DIAGNOSIS — Z13.220 SCREENING FOR LIPOID DISORDERS: ICD-10-CM

## 2024-03-19 DIAGNOSIS — N30.10 INTERSTITIAL CYSTITIS: ICD-10-CM

## 2024-03-19 DIAGNOSIS — D75.89 MACROCYTOSIS: ICD-10-CM

## 2024-03-19 DIAGNOSIS — Z00.00 NORMAL PHYSICAL EXAM, ROUTINE: Primary | ICD-10-CM

## 2024-03-19 DIAGNOSIS — Z12.12 SCREENING FOR COLORECTAL CANCER: ICD-10-CM

## 2024-03-19 LAB
ALBUMIN SERPL BCP-MCNC: 4.1 G/DL (ref 3.5–5.2)
ALP SERPL-CCNC: 46 U/L (ref 55–135)
ALT SERPL W/O P-5'-P-CCNC: 23 U/L (ref 10–44)
ANION GAP SERPL CALC-SCNC: 11 MMOL/L (ref 8–16)
AST SERPL-CCNC: 21 U/L (ref 10–40)
BASOPHILS # BLD AUTO: 0.06 K/UL (ref 0–0.2)
BASOPHILS NFR BLD: 1 % (ref 0–1.9)
BILIRUB SERPL-MCNC: 0.4 MG/DL (ref 0.1–1)
BUN SERPL-MCNC: 10 MG/DL (ref 6–20)
CALCIUM SERPL-MCNC: 9.9 MG/DL (ref 8.7–10.5)
CHLORIDE SERPL-SCNC: 107 MMOL/L (ref 95–110)
CHOLEST SERPL-MCNC: 163 MG/DL (ref 120–199)
CHOLEST/HDLC SERPL: 2.5 {RATIO} (ref 2–5)
CO2 SERPL-SCNC: 21 MMOL/L (ref 23–29)
CREAT SERPL-MCNC: 1.1 MG/DL (ref 0.5–1.4)
DIFFERENTIAL METHOD BLD: ABNORMAL
EOSINOPHIL # BLD AUTO: 0.1 K/UL (ref 0–0.5)
EOSINOPHIL NFR BLD: 1.7 % (ref 0–8)
ERYTHROCYTE [DISTWIDTH] IN BLOOD BY AUTOMATED COUNT: 11.7 % (ref 11.5–14.5)
EST. GFR  (NO RACE VARIABLE): >60 ML/MIN/1.73 M^2
GLUCOSE SERPL-MCNC: 77 MG/DL (ref 70–110)
HCT VFR BLD AUTO: 42.2 % (ref 37–48.5)
HCV AB SERPL QL IA: NORMAL
HDLC SERPL-MCNC: 65 MG/DL (ref 40–75)
HDLC SERPL: 39.9 % (ref 20–50)
HGB BLD-MCNC: 13.9 G/DL (ref 12–16)
IMM GRANULOCYTES # BLD AUTO: 0.01 K/UL (ref 0–0.04)
IMM GRANULOCYTES NFR BLD AUTO: 0.2 % (ref 0–0.5)
LDLC SERPL CALC-MCNC: 83.6 MG/DL (ref 63–159)
LYMPHOCYTES # BLD AUTO: 1 K/UL (ref 1–4.8)
LYMPHOCYTES NFR BLD: 17.5 % (ref 18–48)
MCH RBC QN AUTO: 33 PG (ref 27–31)
MCHC RBC AUTO-ENTMCNC: 32.9 G/DL (ref 32–36)
MCV RBC AUTO: 100 FL (ref 82–98)
MONOCYTES # BLD AUTO: 0.4 K/UL (ref 0.3–1)
MONOCYTES NFR BLD: 6.5 % (ref 4–15)
NEUTROPHILS # BLD AUTO: 4.3 K/UL (ref 1.8–7.7)
NEUTROPHILS NFR BLD: 73.1 % (ref 38–73)
NONHDLC SERPL-MCNC: 98 MG/DL
NRBC BLD-RTO: 0 /100 WBC
PLATELET # BLD AUTO: 231 K/UL (ref 150–450)
PMV BLD AUTO: 12.6 FL (ref 9.2–12.9)
POTASSIUM SERPL-SCNC: 4.7 MMOL/L (ref 3.5–5.1)
PROT SERPL-MCNC: 7 G/DL (ref 6–8.4)
RBC # BLD AUTO: 4.21 M/UL (ref 4–5.4)
SODIUM SERPL-SCNC: 139 MMOL/L (ref 136–145)
TRIGL SERPL-MCNC: 72 MG/DL (ref 30–150)
TSH SERPL DL<=0.005 MIU/L-ACNC: 2.09 UIU/ML (ref 0.4–4)
VIT B12 SERPL-MCNC: 739 PG/ML (ref 210–950)
WBC # BLD AUTO: 5.89 K/UL (ref 3.9–12.7)

## 2024-03-19 PROCEDURE — 84443 ASSAY THYROID STIM HORMONE: CPT | Performed by: INTERNAL MEDICINE

## 2024-03-19 PROCEDURE — 80061 LIPID PANEL: CPT | Performed by: INTERNAL MEDICINE

## 2024-03-19 PROCEDURE — 99396 PREV VISIT EST AGE 40-64: CPT | Mod: S$GLB,,, | Performed by: INTERNAL MEDICINE

## 2024-03-19 PROCEDURE — 3074F SYST BP LT 130 MM HG: CPT | Mod: CPTII,S$GLB,, | Performed by: INTERNAL MEDICINE

## 2024-03-19 PROCEDURE — 1160F RVW MEDS BY RX/DR IN RCRD: CPT | Mod: CPTII,S$GLB,, | Performed by: INTERNAL MEDICINE

## 2024-03-19 PROCEDURE — 3008F BODY MASS INDEX DOCD: CPT | Mod: CPTII,S$GLB,, | Performed by: INTERNAL MEDICINE

## 2024-03-19 PROCEDURE — 82607 VITAMIN B-12: CPT | Performed by: INTERNAL MEDICINE

## 2024-03-19 PROCEDURE — 85025 COMPLETE CBC W/AUTO DIFF WBC: CPT | Performed by: INTERNAL MEDICINE

## 2024-03-19 PROCEDURE — 86803 HEPATITIS C AB TEST: CPT | Performed by: INTERNAL MEDICINE

## 2024-03-19 PROCEDURE — 99999 PR PBB SHADOW E&M-EST. PATIENT-LVL IV: CPT | Mod: PBBFAC,,, | Performed by: INTERNAL MEDICINE

## 2024-03-19 PROCEDURE — 1159F MED LIST DOCD IN RCRD: CPT | Mod: CPTII,S$GLB,, | Performed by: INTERNAL MEDICINE

## 2024-03-19 PROCEDURE — 80053 COMPREHEN METABOLIC PANEL: CPT | Performed by: INTERNAL MEDICINE

## 2024-03-19 PROCEDURE — 36415 COLL VENOUS BLD VENIPUNCTURE: CPT | Mod: PN | Performed by: INTERNAL MEDICINE

## 2024-03-19 PROCEDURE — 3078F DIAST BP <80 MM HG: CPT | Mod: CPTII,S$GLB,, | Performed by: INTERNAL MEDICINE

## 2024-03-20 NOTE — PROGRESS NOTES
I sent pt a my chart message   I reviewed your labs. Your Hepatitis C screen was negative.   Your thyroid functions are normal.  Your Cholesterol looked good.  Your vitamin B12 was normal. Your kidney function and liver functions looked good.  You are not anemic. No further recommendations at this time.  Dr. TORIBIO

## 2024-05-01 ENCOUNTER — TELEPHONE (OUTPATIENT)
Dept: PRIMARY CARE CLINIC | Facility: CLINIC | Age: 44
End: 2024-05-01
Payer: COMMERCIAL

## 2024-05-01 NOTE — TELEPHONE ENCOUNTER
[facility] informing pt that we will need to cancel the e visit she submitted. Pt will need an in person evaluation for c/o rash around a healed dog bite on her leg

## 2024-05-02 ENCOUNTER — TELEPHONE (OUTPATIENT)
Dept: PRIMARY CARE CLINIC | Facility: CLINIC | Age: 44
End: 2024-05-02
Payer: COMMERCIAL

## 2024-05-02 ENCOUNTER — PATIENT MESSAGE (OUTPATIENT)
Dept: PRIMARY CARE CLINIC | Facility: CLINIC | Age: 44
End: 2024-05-02
Payer: COMMERCIAL

## 2024-05-02 NOTE — TELEPHONE ENCOUNTER
----- Message from CHUCKCassie Vela sent at 5/1/2024  4:22 PM CDT -----  Contact: Self [phone]  Would like to receive medical advice.    Would they like a call back or a response via [facility]:  call back     Additional information:  Calling to speak with office about getting a sooner appt for  c/o rash around a healed dog bite on her leg. Offered appt for 5/16 pt didn't wan that.

## 2024-05-03 ENCOUNTER — OFFICE VISIT (OUTPATIENT)
Dept: PRIMARY CARE CLINIC | Facility: CLINIC | Age: 44
End: 2024-05-03
Payer: COMMERCIAL

## 2024-05-03 VITALS
OXYGEN SATURATION: 95 % | HEIGHT: 67 IN | HEART RATE: 71 BPM | WEIGHT: 138.69 LBS | BODY MASS INDEX: 21.77 KG/M2 | DIASTOLIC BLOOD PRESSURE: 85 MMHG | SYSTOLIC BLOOD PRESSURE: 112 MMHG

## 2024-05-03 DIAGNOSIS — R21 RASH: Primary | ICD-10-CM

## 2024-05-03 PROCEDURE — 1160F RVW MEDS BY RX/DR IN RCRD: CPT | Mod: CPTII,S$GLB,, | Performed by: STUDENT IN AN ORGANIZED HEALTH CARE EDUCATION/TRAINING PROGRAM

## 2024-05-03 PROCEDURE — 1159F MED LIST DOCD IN RCRD: CPT | Mod: CPTII,S$GLB,, | Performed by: STUDENT IN AN ORGANIZED HEALTH CARE EDUCATION/TRAINING PROGRAM

## 2024-05-03 PROCEDURE — 3008F BODY MASS INDEX DOCD: CPT | Mod: CPTII,S$GLB,, | Performed by: STUDENT IN AN ORGANIZED HEALTH CARE EDUCATION/TRAINING PROGRAM

## 2024-05-03 PROCEDURE — 99999 PR PBB SHADOW E&M-EST. PATIENT-LVL III: CPT | Mod: PBBFAC,,, | Performed by: STUDENT IN AN ORGANIZED HEALTH CARE EDUCATION/TRAINING PROGRAM

## 2024-05-03 PROCEDURE — 99213 OFFICE O/P EST LOW 20 MIN: CPT | Mod: S$GLB,,, | Performed by: STUDENT IN AN ORGANIZED HEALTH CARE EDUCATION/TRAINING PROGRAM

## 2024-05-03 PROCEDURE — 3079F DIAST BP 80-89 MM HG: CPT | Mod: CPTII,S$GLB,, | Performed by: STUDENT IN AN ORGANIZED HEALTH CARE EDUCATION/TRAINING PROGRAM

## 2024-05-03 PROCEDURE — 3074F SYST BP LT 130 MM HG: CPT | Mod: CPTII,S$GLB,, | Performed by: STUDENT IN AN ORGANIZED HEALTH CARE EDUCATION/TRAINING PROGRAM

## 2024-05-03 NOTE — PROGRESS NOTES
"Office visit  Patient: Ghada Manley   5/3/2024     Assessment:     1. Rash      Plan:       1. Rash      -no signs/symptoms of bacterial infection       -advised patient to try hydrocortisone cream      -also try topical Benadryl spray      -return precautions discussed.      CHIEF COMPLAINT: rash    HPI: Ghada Manley is a 43 y.o. female who presents for a dog bite. She reports that about a week and a half ago, her dog bit her on the left knee. It mostly was bruised, but there was a tiny area where the skin was broken. She reports this scabbed over and then the scab fell off. She put antibiotic cream on it the first day, but nothing since.  She denies any pus or drainage or significant erythema. A day or so ago, she noticed an area of rash developing where her scab was.        Current Outpatient Medications   Medication Instructions    amitriptyline (ELAVIL) 10 MG tablet Take 1-2 tablets nightly    Lactobacillus acidophilus (PROBIOTIC ORAL) Oral    LO LOESTRIN FE 1 mg-10 mcg (24)/10 mcg (2) Tab 1 tablet, Oral, Daily    multivitamin (THERAGRAN) per tablet 1 tablet, Oral, Daily    psyllium seed, with sugar, (FIBER ORAL) Oral    tolterodine (DETROL LA) 4 mg, Oral, Daily       No results found for: "HGBA1C"  No results found for: "MICALBCREAT"  Lab Results   Component Value Date    LDLCALC 83.6 03/19/2024    LDLCALC 75.4 01/31/2023    CHOL 163 03/19/2024    HDL 65 03/19/2024    TRIG 72 03/19/2024       Lab Results   Component Value Date     03/19/2024    K 4.7 03/19/2024     03/19/2024    CO2 21 (L) 03/19/2024    GLU 77 03/19/2024    BUN 10 03/19/2024    CREATININE 1.1 03/19/2024    CALCIUM 9.9 03/19/2024    PROT 7.0 03/19/2024    ALBUMIN 4.1 03/19/2024    BILITOT 0.4 03/19/2024    ALKPHOS 46 (L) 03/19/2024    AST 21 03/19/2024    ALT 23 03/19/2024    ANIONGAP 11 03/19/2024    ESTGFRAFRICA >60.0 09/28/2021    EGFRNONAA >60.0 09/28/2021    WBC 5.89 03/19/2024    HGB 13.9 03/19/2024    HGB 13.1 " 2023    HCT 42.2 2024     (H) 2024     2024    TSH 2.092 2024    HEPCAB Non-reactive 2024       Lab Results   Component Value Date    [id number] 739 2024         Past Medical History:   Diagnosis Date    Acute right-sided low back pain with right-sided sciatica 2021    Dysmenorrhea     Ectopic pregnancy     Endometriosis     IBS (irritable bowel syndrome)     Interstitial cystitis     Menarche     Age of onset 11    Ocular migraine     Ocular migraine 2021    Urine, incontinence, stress female      Past Surgical History:   Procedure Laterality Date     SECTION      x 2    LAPAROSCOPY      SALPINGECTOMY      ECTOPIC PREGNANCY     There were no vitals filed for this visit.  Objective:   Physical Exam  Vitals reviewed.   Constitutional:       General: She is not in acute distress.     Appearance: Normal appearance. She is not diaphoretic.   HENT:      Head: Normocephalic.      Right Ear: External ear normal.      Left Ear: External ear normal.   Eyes:      General: No scleral icterus.     Conjunctiva/sclera: Conjunctivae normal.   Cardiovascular:      Comments: Appears well-perfused  Pulmonary:      Effort: Pulmonary effort is normal. No respiratory distress.   Musculoskeletal:         General: Normal range of motion.      Cervical back: Normal range of motion.   Skin:     Findings: Rash present. No lesion.             Comments: Very small area, about quarter size of raised erythematous punctate lesions; no gross erythema, no pus draining, no pain or fluctuance   Neurological:      General: No focal deficit present.      Mental Status: She is alert and oriented to person, place, and time.   Psychiatric:         Mood and Affect: Mood normal.         Behavior: Behavior normal.         Thought Content: Thought content normal.             Sraahy Noel MD  Internal Medicine and Pediatrics

## 2024-05-24 ENCOUNTER — CLINICAL SUPPORT (OUTPATIENT)
Dept: ENDOSCOPY | Facility: HOSPITAL | Age: 44
End: 2024-05-24
Attending: INTERNAL MEDICINE
Payer: COMMERCIAL

## 2024-05-24 DIAGNOSIS — Z12.11 SCREENING FOR COLORECTAL CANCER: ICD-10-CM

## 2024-05-24 DIAGNOSIS — Z12.12 SCREENING FOR COLORECTAL CANCER: ICD-10-CM

## 2024-05-28 ENCOUNTER — TELEPHONE (OUTPATIENT)
Dept: ENDOSCOPY | Facility: HOSPITAL | Age: 44
End: 2024-05-28
Payer: COMMERCIAL

## 2024-05-28 ENCOUNTER — PATIENT MESSAGE (OUTPATIENT)
Dept: ENDOSCOPY | Facility: HOSPITAL | Age: 44
End: 2024-05-28
Payer: COMMERCIAL

## 2024-05-28 DIAGNOSIS — Z12.11 SCREENING FOR COLORECTAL CANCER: Primary | ICD-10-CM

## 2024-05-28 DIAGNOSIS — Z12.12 SCREENING FOR COLORECTAL CANCER: Primary | ICD-10-CM

## 2024-05-28 RX ORDER — POLYETHYLENE GLYCOL 3350, SODIUM SULFATE, POTASSIUM CHLORIDE, MAGNESIUM SULFATE, AND SODIUM CHLORIDE FOR ORAL SOLUTION 178.7-7.3G
1 KIT ORAL DAILY
Qty: 2 EACH | Refills: 0 | Status: SHIPPED | OUTPATIENT
Start: 2024-05-28 | End: 2024-05-30

## 2024-05-28 NOTE — TELEPHONE ENCOUNTER
Spoke to patient to schedule procedure(s) Colonoscopy       Physician to perform procedure(s) Dr. COLUMBA Villagran  Date of Procedure (s) 8/16/24  Arrival Time 9:00 AM  Time of Procedure(s) 10:00 AM   Location of Procedure(s) [facility]  Type of Rx Prep sent to patient: [facility]  Instructions provided to patient via [facility]    Patient was informed on the following information and verbalized understanding. Screening questionnaire reviewed with patient and complete. If procedure requires anesthesia, a responsible adult needs to be present to accompany the patient home, patient cannot drive after receiving anesthesia. Appointment details are tentative, especially check-in time. Patient will receive a prep-op call 7 days prior to confirm check-in time for procedure. If applicable the patient should contact their pharmacy to verify Rx for procedure prep is ready for pick-up. Patient was advised to call the scheduling department at [phone] if pharmacy states no Rx is available. Patient was advised to call the endoscopy scheduling department if any questions or concerns arise.      [facility] Scheduling Department

## 2024-06-13 ENCOUNTER — HOSPITAL ENCOUNTER (OUTPATIENT)
Dept: RADIOLOGY | Facility: HOSPITAL | Age: 44
Discharge: HOME | End: 2024-06-13
Payer: COMMERCIAL

## 2024-06-13 DIAGNOSIS — R92.333 HETEROGENEOUSLY DENSE TISSUE OF BOTH BREASTS ON MAMMOGRAPHY: ICD-10-CM

## 2024-06-13 DIAGNOSIS — Z91.89 AT HIGH RISK FOR BREAST CANCER: ICD-10-CM

## 2024-06-13 DIAGNOSIS — Z80.3 FAMILY HISTORY OF BREAST CANCER: ICD-10-CM

## 2024-06-13 PROCEDURE — 25500020 PHARM REV CODE 255

## 2024-06-13 PROCEDURE — 77049 MRI BREAST C-+ W/CAD BI: CPT | Mod: TC

## 2024-06-13 PROCEDURE — A9577 INJ MULTIHANCE: HCPCS

## 2024-06-13 PROCEDURE — 77049 MRI BREAST C-+ W/CAD BI: CPT | Mod: 26,,, | Performed by: RADIOLOGY

## 2024-06-13 RX ADMIN — GADOBENATE DIMEGLUMINE 13 ML: 529 INJECTION, SOLUTION INTRAVENOUS at 11:06

## 2024-06-14 NOTE — PROGRESS NOTES
Dionne Guzman called to request a refill on her medication.      Last office visit : 3/26/2024   Next office visit : 9/26/2024     Last UDS:   Benzodiazepine Screen, Urine   Date Value Ref Range Status   08/09/2023 n  Final     Buprenorphine Urine   Date Value Ref Range Status   08/09/2023 n  Final     Cocaine Metabolite Screen, Urine   Date Value Ref Range Status   08/09/2023 n  Final     Gabapentin Screen, Urine   Date Value Ref Range Status   08/09/2023 n  Final     MDMA, Urine   Date Value Ref Range Status   08/09/2023 n  Final     Oxycodone Screen, Ur   Date Value Ref Range Status   08/09/2023 n  Final     Propoxyphene Screen, Urine   Date Value Ref Range Status   08/09/2023 n  Final     THC Screen, Urine   Date Value Ref Range Status   08/09/2023 n  Final     Tricyclic Antidepressants, Urine   Date Value Ref Range Status   08/09/2023 n  Final         Medication Contract: na   Last Fill: 5/14/24    Requested Prescriptions     Pending Prescriptions Disp Refills    butalbital-acetaminophen-caffeine (FIORICET, ESGIC) -40 MG per tablet 45 tablet 0     Sig: Take 1 tablet by mouth every 6 hours as needed for Headaches    clonazePAM (KLONOPIN) 1 MG tablet 45 tablet 0     Sig: TAKE 1 TABLET BY MOUTH TWICE DAILY AS NEEDED FOR ANXIETY. MAX DAILY AMOUNT 2 MG    amphetamine-dextroamphetamine (ADDERALL XR) 30 MG extended release capsule 30 capsule 0     Sig: Take 1 capsule by mouth daily for 30 days.         Please approve or refuse this medication.   Ronda Andrew LPN    LINKS immunization registry, Care Everywhere and Health Maintenance updated.  Chart reviewed for overdue Proactive [facility] health maintenance testing.

## 2024-07-22 ENCOUNTER — PATIENT MESSAGE (OUTPATIENT)
Dept: HEMATOLOGY/ONCOLOGY | Facility: CLINIC | Age: 44
End: 2024-07-22
Payer: COMMERCIAL

## 2024-08-11 DIAGNOSIS — N30.10 INTERSTITIAL CYSTITIS: ICD-10-CM

## 2024-08-12 ENCOUNTER — PATIENT MESSAGE (OUTPATIENT)
Dept: PRIMARY CARE CLINIC | Facility: CLINIC | Age: 44
End: 2024-08-12
Payer: COMMERCIAL

## 2024-08-12 RX ORDER — TOLTERODINE 4 MG/1
4 CAPSULE, EXTENDED RELEASE ORAL
Qty: 90 CAPSULE | Refills: 3 | Status: SHIPPED | OUTPATIENT
Start: 2024-08-12

## 2024-08-13 ENCOUNTER — TELEPHONE (OUTPATIENT)
Dept: ENDOSCOPY | Facility: HOSPITAL | Age: 44
End: 2024-08-13
Payer: COMMERCIAL

## 2024-08-13 NOTE — TELEPHONE ENCOUNTER
Received patient's call. Patient states she needs her procedure scheduled as preventative instead of routine. Informed patient her colonoscopy is scheduled as a not high risk screening for colorectal cancer. Patient wants to know why she has to pay the $1900. Informed patient our pre-service department may be able to answer that question for her. Phone number provided and call transferred.

## 2024-08-14 ENCOUNTER — TELEPHONE (OUTPATIENT)
Dept: ENDOSCOPY | Facility: HOSPITAL | Age: 44
End: 2024-08-14
Payer: COMMERCIAL

## 2024-08-14 DIAGNOSIS — Z12.11 SPECIAL SCREENING FOR MALIGNANT NEOPLASMS, COLON: Primary | ICD-10-CM

## 2024-08-14 RX ORDER — POLYETHYLENE GLYCOL 3350, SODIUM SULFATE, POTASSIUM CHLORIDE, MAGNESIUM SULFATE, AND SODIUM CHLORIDE FOR ORAL SOLUTION 178.7-7.3G
1 KIT ORAL ONCE
Qty: 1 EACH | Refills: 0 | Status: SHIPPED | OUTPATIENT
Start: 2024-08-14 | End: 2024-08-14

## 2024-08-14 NOTE — TELEPHONE ENCOUNTER
Received call from patient. Spoke to patient for pre-call to confirm scheduled Colonoscopy and patient verbalized understanding of the following:       Date of Procedure (s)  verified 8/16/24  Arrival Time 9:00 AM verified.  Location of Procedure(s) [facility] verified.  NPO status reinforced. Ok to continue clear liquids up until 4 hours prior to the Endoscopy procedure.   Pt confirmed receipt of prep instructions and Rx prep (if applicable). Patient will  prep today.  Instructions provided to patient via [facility]  Pt confirmed ride home after procedure if procedure requires anesthesia.   Pre-call screening questionnaire reviewed and completed with patient.   Appointment details are tentative, including check-in time.  If the patient begins taking any blood thinning medications, injectable weight loss/diabetes medications (other than insulin), or Adipex (phentermine) patient was instructed to contact the endoscopy scheduling department as soon as possible.  Patient was advised to call the endoscopy scheduling department if any questions or concerns arise.        Endoscopy Scheduling Department

## 2024-08-16 ENCOUNTER — HOSPITAL ENCOUNTER (OUTPATIENT)
Facility: HOSPITAL | Age: 44
Discharge: HOME | End: 2024-08-16
Attending: INTERNAL MEDICINE | Admitting: INTERNAL MEDICINE
Payer: COMMERCIAL

## 2024-08-16 ENCOUNTER — ANESTHESIA (OUTPATIENT)
Dept: ENDOSCOPY | Facility: HOSPITAL | Age: 44
End: 2024-08-16
Payer: COMMERCIAL

## 2024-08-16 ENCOUNTER — ANESTHESIA EVENT (OUTPATIENT)
Dept: ENDOSCOPY | Facility: HOSPITAL | Age: 44
End: 2024-08-16
Payer: COMMERCIAL

## 2024-08-16 VITALS
DIASTOLIC BLOOD PRESSURE: 68 MMHG | SYSTOLIC BLOOD PRESSURE: 99 MMHG | BODY MASS INDEX: 21.19 KG/M2 | HEIGHT: 67 IN | WEIGHT: 135 LBS | HEART RATE: 68 BPM | TEMPERATURE: 98 F | OXYGEN SATURATION: 100 % | RESPIRATION RATE: 18 BRPM

## 2024-08-16 DIAGNOSIS — Z12.11 SCREENING FOR COLON CANCER: ICD-10-CM

## 2024-08-16 LAB
B-HCG UR QL: NEGATIVE
CTP QC/QA: YES

## 2024-08-16 PROCEDURE — 63600175 PHARM REV CODE 636 W HCPCS: Performed by: NURSE ANESTHETIST, CERTIFIED REGISTERED

## 2024-08-16 PROCEDURE — 37000008 HC ANESTHESIA 1ST 15 MINUTES: Performed by: INTERNAL MEDICINE

## 2024-08-16 PROCEDURE — G0121 COLON CA SCRN NOT HI RSK IND: HCPCS | Mod: ,,, | Performed by: INTERNAL MEDICINE

## 2024-08-16 PROCEDURE — 25000003 PHARM REV CODE 250: Performed by: NURSE ANESTHETIST, CERTIFIED REGISTERED

## 2024-08-16 PROCEDURE — 81025 URINE PREGNANCY TEST: CPT | Performed by: INTERNAL MEDICINE

## 2024-08-16 PROCEDURE — 37000009 HC ANESTHESIA EA ADD 15 MINS: Performed by: INTERNAL MEDICINE

## 2024-08-16 PROCEDURE — G0121 COLON CA SCRN NOT HI RSK IND: HCPCS | Performed by: INTERNAL MEDICINE

## 2024-08-16 RX ORDER — PROPOFOL 10 MG/ML
VIAL (ML) INTRAVENOUS
Status: DISCONTINUED | OUTPATIENT
Start: 2024-08-16 | End: 2024-08-16

## 2024-08-16 RX ORDER — SODIUM CHLORIDE 0.9 % (FLUSH) 0.9 %
10 SYRINGE (ML) INJECTION
Status: DISCONTINUED | OUTPATIENT
Start: 2024-08-16 | End: 2024-08-16 | Stop reason: HOSPADM

## 2024-08-16 RX ORDER — SODIUM CHLORIDE 9 MG/ML
INJECTION, SOLUTION INTRAVENOUS CONTINUOUS
Status: DISCONTINUED | OUTPATIENT
Start: 2024-08-16 | End: 2024-08-16 | Stop reason: HOSPADM

## 2024-08-16 RX ORDER — ONDANSETRON HYDROCHLORIDE 2 MG/ML
INJECTION, SOLUTION INTRAVENOUS
Status: DISCONTINUED | OUTPATIENT
Start: 2024-08-16 | End: 2024-08-16

## 2024-08-16 RX ORDER — LIDOCAINE HYDROCHLORIDE 20 MG/ML
INJECTION INTRAVENOUS
Status: DISCONTINUED | OUTPATIENT
Start: 2024-08-16 | End: 2024-08-16

## 2024-08-16 RX ADMIN — ONDANSETRON 4 MG: 2 INJECTION INTRAMUSCULAR; INTRAVENOUS at 09:08

## 2024-08-16 RX ADMIN — SODIUM CHLORIDE: 0.9 INJECTION, SOLUTION INTRAVENOUS at 09:08

## 2024-08-16 RX ADMIN — LIDOCAINE HYDROCHLORIDE 50 MG: 20 INJECTION INTRAVENOUS at 09:08

## 2024-08-16 RX ADMIN — PROPOFOL 100 MG: 10 INJECTION, EMULSION INTRAVENOUS at 09:08

## 2024-08-16 NOTE — H&P
Short Stay Endoscopy History and Physical    PCP - Tatyana Faustin MD  Referring Physician - Self, Aaareferral  No address on file    Procedure - colonoscopy  ASA - per anesthesia  Mallampati - per anesthesia  History of Anesthesia problems - no  Family history Anesthesia problems -  no   Plan of anesthesia - General    HPI:  This is a 44 y.o. female here for evaluation of: screening    Reflux - no  Dysphagia - no  Abdominal pain - no  Diarrhea - no    ROS:  Constitutional: No fevers, chills, No weight loss  CV: No chest pain  Pulm: No cough, No shortness of breath  GI: see HPI    Medical History:  has a past medical history of Acute right-sided low back pain with right-sided sciatica (2021), Dysmenorrhea, Ectopic pregnancy, Endometriosis, IBS (irritable bowel syndrome), Interstitial cystitis, Menarche, Ocular migraine, Ocular migraine (2021), and Urine, incontinence, stress female.    Surgical History:  has a past surgical history that includes Salpingectomy;  section; and Laparoscopy ().    Family History: family history includes Asthma in her mother; Breast cancer (age of onset: 28) in her maternal aunt; Breast cancer (age of onset: 61) in her mother; Breast cancer (age of onset: 70) in her maternal grandmother; Cancer in her mother; Diabetes in her father, maternal grandfather, and maternal grandmother; Hypertension in her father and maternal grandfather; Stroke in her mother..    Social History:  reports that she has never smoked. She has never used smokeless tobacco. She reports current alcohol use of about 4.0 - 6.0 standard drinks of alcohol per week. She reports that she does not use drugs.    Review of patient's allergies indicates:   Allergen Reactions    Ciprofloxacin (bulk) Anaphylaxis    Codeine Nausea Only       Medications:   Medications Prior to Admission   Medication Sig Dispense Refill Last Dose    amitriptyline (ELAVIL) 10 MG tablet Take 1-2 tablets nightly 180  tablet 3     Lactobacillus acidophilus (PROBIOTIC ORAL) Take by mouth.       LO LOESTRIN FE 1 mg-10 mcg (24)/10 mcg (2) Tab Take 1 tablet by mouth Daily. 84 tablet 4     multivitamin (THERAGRAN) per tablet Take 1 tablet by mouth once daily.       psyllium seed, with sugar, (FIBER ORAL) Take by mouth.       tolterodine (DETROL LA) 4 MG 24 hr capsule TAKE 1 CAPSULE DAILY 90 capsule 3        Physical Exam:    Vital Signs: There were no vitals filed for this visit.    General Appearance: Well appearing in no acute distress  Lungs: no labored breathing  CVS:  regular rate  Abdomen: non tender    Labs:  Lab Results   Component Value Date    WBC 5.89 03/19/2024    HGB 13.9 03/19/2024    HCT 42.2 03/19/2024     03/19/2024    CHOL 163 03/19/2024    TRIG 72 03/19/2024    HDL 65 03/19/2024    ALT 23 03/19/2024    AST 21 03/19/2024     03/19/2024    K 4.7 03/19/2024     03/19/2024    CREATININE 1.1 03/19/2024    BUN 10 03/19/2024    CO2 21 (L) 03/19/2024    TSH 2.092 03/19/2024       I have explained the risks and benefits of this endoscopic procedure to the patient including but not limited to bleeding, inflammation, infection, perforation, and death.      Bruce Villagran MD

## 2024-08-16 NOTE — TRANSFER OF CARE
"Anesthesia Transfer of Care Note    Patient: Ghada Manley    Procedure(s) Performed: Procedure(s) (LRB):  COLONOSCOPY (N/A)    Patient location: GI    Anesthesia Type: general    Transport from OR: Transported from OR on room air with adequate spontaneous ventilation    Post pain: adequate analgesia    Post assessment: no apparent anesthetic complications    Post vital signs: stable    Level of consciousness: awake    Nausea/Vomiting: no nausea/vomiting    Complications: none    Transfer of care protocol was followed      Last vitals: Visit Vitals  /51  (BP Location: Left arm, Patient Position: Sitting)   Pulse 95   Temp 36.5 °C (97.7 °F) (Temporal)   Resp 18   Ht 5' 7" (1.702 m)   Wt 61.2 kg (135 lb)   SpO2 100%   Breastfeeding No   BMI 21.14 kg/m²     "

## 2024-08-16 NOTE — ANESTHESIA POSTPROCEDURE EVALUATION
Anesthesia Post Evaluation    Patient: Ghada Manley    Procedure(s) Performed: Procedure(s) (LRB):  COLONOSCOPY (N/A)    Final Anesthesia Type: general      Patient location during evaluation: PACU  Patient participation: Yes- Able to Participate  Level of consciousness: awake and alert and oriented  Post-procedure vital signs: reviewed and stable  Pain management: adequate  Airway patency: patent    PONV status at discharge: No PONV  Anesthetic complications: no      Cardiovascular status: blood pressure returned to baseline and hemodynamically stable  Respiratory status: unassisted  Hydration status: euvolemic  Follow-up not needed.              Vitals Value Taken Time   BP 99/68 08/16/24 1035   Temp 36.6 °C (97.8 °F) 08/16/24 1002   Pulse 68 08/16/24 1035   Resp 18 08/16/24 1035   SpO2 100 % 08/16/24 1035         No case tracking events are documented in the log.      Pain/Roni Score: Roni Score: 10 (8/16/2024 10:03 AM)

## 2024-08-16 NOTE — ANESTHESIA PREPROCEDURE EVALUATION
2024  Ghada Manley is a 44 y.o., female.  Past Medical History:   Diagnosis Date    Acute right-sided low back pain with right-sided sciatica 2021    Dysmenorrhea     Ectopic pregnancy     Endometriosis     IBS (irritable bowel syndrome)     Interstitial cystitis     Menarche     Age of onset 11    Ocular migraine     Ocular migraine 2021    Urine, incontinence, stress female      Past Surgical History:   Procedure Laterality Date     SECTION      x 2    LAPAROSCOPY  2001    SALPINGECTOMY      ECTOPIC PREGNANCY         Pre-op Assessment    I have reviewed the Patient Summary Reports.     I have reviewed the Nursing Notes. I have reviewed the NPO Status.   I have reviewed the Medications.     Review of Systems  Anesthesia Hx:  No problems with previous Anesthesia             Denies Family Hx of Anesthesia complications.    Denies Personal Hx of Anesthesia complications.                    Hematology/Oncology:  Hematology Normal   Oncology Normal                                   EENT/Dental:  EENT/Dental Normal           Cardiovascular:  Cardiovascular Normal                                            Pulmonary:  Pulmonary Normal                       Renal/:  Renal/ Normal                 Hepatic/GI:  Hepatic/GI Normal                 Musculoskeletal:  Musculoskeletal Normal                Neurological:    Neuromuscular Disease,  Headaches                                 Endocrine:  Endocrine Normal            Dermatological:  Skin Normal    Psych:  Psychiatric Normal                    Physical Exam  General: Well nourished    Airway:  Mallampati: II   Mouth Opening: Normal  TM Distance: Normal  Tongue: Normal  Neck ROM: Normal ROM    Dental:  Intact        Anesthesia Plan  Type of Anesthesia, risks & benefits discussed:    Anesthesia Type: Gen Natural Airway  Intra-op  Monitoring Plan: Standard ASA Monitors  Informed Consent: Informed consent signed with the Patient and all parties understand the risks and agree with anesthesia plan.  All questions answered.   ASA Score: 2  Day of Surgery Review of History & Physical: H&P Update referred to the surgeon/provider.I have interviewed and examined the patient. I have reviewed the patient's H&P dated: There are no significant changes.     Ready For Surgery From Anesthesia Perspective.     .

## 2024-08-16 NOTE — [employer]ATION PATIENT INSTRUCTIONS
Discharge Summary/Instructions after an Endoscopic Procedure  Patient Name: Ghada Manley  Patient MRN: [id number]  Patient YOB: 1980 Friday, August 16, 2024  Bruce Villagran MD  Dear patient,  As a result of recent federal legislation (The Federal Cures Act), you may   receive lab or pathology results from your procedure in your [facility]   account before your physician is able to contact you. Your physician or   their representative will relay the results to you with their   recommendations at their soonest availability.  Thank you,  RESTRICTIONS:  During your procedure today, you received medications for sedation.  These   medications may affect your judgment, balance and coordination.  Therefore,   for 24 hours, you have the following restrictions:   - DO NOT drive a car, operate machinery, make legal/financial decisions,   sign important papers or drink alcohol.    ACTIVITY:  Today: no heavy lifting, straining or running due to procedural   sedation/anesthesia.  The following day: return to full activity including work.  DIET:  Eat and drink normally unless instructed otherwise.     TREATMENT FOR COMMON SIDE EFFECTS:  - Mild abdominal pain, nausea, belching, bloating or excessive gas:  rest,   eat lightly and use a heating pad.  - Sore Throat: treat with throat lozenges and/or gargle with warm salt   water.  - Because air was used during the procedure, expelling large amounts of air   from your rectum or belching is normal.  - If a bowel prep was taken, you may not have a bowel movement for 1-3 days.    This is normal.  SYMPTOMS TO WATCH FOR AND REPORT TO YOUR PHYSICIAN:  1. Abdominal pain or bloating, other than gas cramps.  2. Chest pain.  3. Back pain.  4. Signs of infection such as: chills or fever occurring within 24 hours   after the procedure.  5. Rectal bleeding, which would show as bright red, maroon, or black stools.   (A tablespoon of blood from the rectum is not serious, especially if    hemorrhoids are present.)  6. Vomiting.  7. Weakness or dizziness.  GO DIRECTLY TO THE NEAREST EMERGENCY ROOM IF YOU HAVE ANY OF THE FOLLOWING:      Difficulty breathing              Chills and/or fever over 101 F   Persistent vomiting and/or vomiting blood   Severe abdominal pain   Severe chest pain   Black, tarry stools   Bleeding- more than one tablespoon   Any other symptom or condition that you feel may need urgent attention  Your doctor recommends these additional instructions:  If any biopsies were taken, your doctors clinic will contact you in 1 to 2   weeks with any results.  - Discharge patient to home (ambulatory).   - Patient has a contact number available for emergencies.  The signs and   symptoms of potential delayed complications were discussed with the   patient.  Return to normal activities tomorrow.  Written discharge   instructions were provided to the patient.   - Resume previous diet.   - Continue present medications.   - Return to primary care physician as previously scheduled.   - Repeat colonoscopy in 10 years for screening purposes.  For questions, problems or results please call your physician - Bruce Villagran MD at Work:  [phone].  [facility], EMERGENCY ROOM PHONE NUMBER: [phone]  IF A COMPLICATION OR EMERGENCY SITUATION ARISES AND YOU ARE UNABLE TO REACH   YOUR PHYSICIAN - GO DIRECTLY TO THE EMERGENCY ROOM.  Bruce Villagran MD  8/16/2024 9:57:54 AM  This report has been verified and signed electronically.  Dear patient,  As a result of recent federal legislation (The Federal Cures Act), you may   receive lab or pathology results from your procedure in your [facility]   account before your physician is able to contact you. Your physician or   their representative will relay the results to you with their   recommendations at their soonest availability.  Thank you,  [employer]ATION

## 2024-09-04 NOTE — PROGRESS NOTES
2024    SUBJECTIVE:   44 y.o. female for annual exam. She has an appointment in 10/2024 to establish care with Gyn provider.     History of abnormal uterine bleeding/ endometriosis  --on continuous dosing of Lo Loestrin Fe OCPs   --tolerating well, denies BTB   --does not need refills at this time     IBS  --taking fiber and probiotic      IC  --4-5 flares in the past year -- takes Amitriptyline 10 mg daily with relief, will take 20 mg for 2-3 days with a flare with relief  --taking detrol LA 4 mg daily     History of stress incontinence:  --denies any concerns currently       Past Medical History:   Diagnosis Date    Acute right-sided low back pain with right-sided sciatica 2021    Dysmenorrhea     Ectopic pregnancy     Endometriosis     IBS (irritable bowel syndrome)     Interstitial cystitis     Menarche     Age of onset 11    Ocular migraine     Ocular migraine 2021    Urine, incontinence, stress female        Past Surgical History:   Procedure Laterality Date     SECTION      x 2    COLONOSCOPY N/A 2024    Procedure: COLONOSCOPY;  Surgeon: Bruce Villagran MD;  Location: [address]);  Service: Endoscopy;  Laterality: N/A;  ref by / suflave inst portal-RB   - precall, no answer - M  8/15-pt confirmed appt, see telephone encounter -KPvt    LAPAROSCOPY      SALPINGECTOMY      ECTOPIC PREGNANCY       Family History   Problem Relation Name Age of Onset    Diabetes Maternal Grandmother Ghada Chakraborty     Breast cancer Maternal Grandmother Ghada Chakraborty 70    Diabetes Maternal Grandfather KARRIE Klein     Hypertension Maternal Grandfather ELIGIOLubnaLOLLYLubna Klein     Diabetes Father Guevara Klein     Hypertension Father Guevara Klein     Breast cancer Mother Bella Klein 61    Asthma Mother Bella Klein     Cancer Mother Bella Klein     Stroke Mother Bella Klein     Breast cancer Maternal Aunt Janine Mcmahon 28    Colon cancer Neg Hx      Ovarian cancer Neg Hx       Vaginal cancer Neg Hx      Endometrial cancer Neg Hx      Cervical cancer Neg Hx         Social History     Socioeconomic History    Marital status:     Number of children: 2   Tobacco Use    Smoking status: Never    Smokeless tobacco: Never   Substance and Sexual Activity    Alcohol use: Yes     Alcohol/week: 4.0 - 6.0 standard drinks of alcohol     Types: 4 - 6 Glasses of wine per week     Comment: 4-6/wk - max    Drug use: Never    Sexual activity: Yes     Partners: Male     Birth control/protection: Partner-Vasectomy, OCP     Social Determinants of Health     Financial Resource Strain: Low Risk  (1/22/2024)    Overall Financial Resource Strain (CARDIA)     Difficulty of Paying Living Expenses: Not hard at all   Food Insecurity: No Food Insecurity (1/22/2024)    Hunger Vital Sign     Worried About Running Out of Food in the Last Year: Never true     Ran Out of Food in the Last Year: Never true   Transportation Needs: No Transportation Needs (1/22/2024)    PRAPARE - Transportation     Lack of Transportation (Medical): No     Lack of Transportation (Non-Medical): No   Physical Activity: Sufficiently Active (1/22/2024)    Exercise Vital Sign     Days of Exercise per Week: 7 days     Minutes of Exercise per Session: 30 min   Stress: No Stress Concern Present (1/22/2024)    [place] [employer] - Occupational Stress Questionnaire     Feeling of Stress : Only a little   Housing Stability: Low Risk  (1/22/2024)    Housing Stability Vital Sign     Unable to Pay for Housing in the Last Year: No     Number of Places Lived in the Last Year: 1     Unstable Housing in the Last Year: No       Current Outpatient Medications   Medication Sig Dispense Refill    Lactobacillus acidophilus (PROBIOTIC ORAL) Take by mouth.      LO LOESTRIN FE 1 mg-10 mcg (24)/10 mcg (2) Tab Take 1 tablet by mouth Daily. 84 tablet 4    multivitamin (THERAGRAN) per tablet Take 1 tablet by mouth once daily.      psyllium  "seed, with sugar, (FIBER ORAL) Take by mouth.      tolterodine (DETROL LA) 4 MG 24 hr capsule TAKE 1 CAPSULE DAILY 90 capsule 3    amitriptyline (ELAVIL) 10 MG tablet Take 1-2 tablets nightly 180 tablet 3     No current facility-administered medications for this visit.       Review of patient's allergies indicates:   Allergen Reactions    Ciprofloxacin (bulk) Anaphylaxis    Codeine Nausea Only       No LMP recorded. (Menstrual status: Birth Control).  Continuous ocp's    Well Woman:  Last pap: 2021 normal HPV negative  No h/o abnl paps/STDs.   Last mammogram: 2023 negative, TC score 27.15%  Last breast MRI: 2024 negative   Colonoscopy:  normal, repeat in 10 years  DEXA: NA    OB History          3    Para   2    Term   2            AB   1    Living   2         SAB        IAB        Ectopic   1    Multiple        Live Births   2                 ROS:  Feeling well.   No abdominal pain, change in bowel habits, black or bloody stools.  No major changes.  Takes fiber, probiotic, and amitriptyline    OBJECTIVE:   The patient appears well, alert, oriented x 3, in no distress.  /73 (BP Location: Right arm, Patient Position: Sitting, BP Method: Medium (Automatic))   Pulse 80   Ht 5' 7" (1.702 m)   Wt 64.3 kg (141 lb 12.1 oz)   BMI 22.20 kg/m²   ENT normal.   Normal respiratory effort   Extremities show no edema      ASSESSMENT:   1. Endometriosis        2. Irritable bowel syndrome with both constipation and diarrhea        3. Interstitial cystitis        4. History of stress incontinence              PLAN:     1. History of abnormal uterine bleeding/ endometriosis  --continue ocp's    2.  IBS  --continue fiber + probiotic  --continue avoiding dietary triggers  --continue meditation/relaxation exercises     3. IC  --avoid dietary irritants              --could try prelief before any irritants to help reduce effect  --manage stress/ anxiety  --continue detrol LA 4 mg  --continue " amitriptyline 10-20 mg daily; may increase shortterm for flares  --consider cystohydrodistension vs. bladder instillations in not improved  --consider prelief because cystex worked for her  --consider elmiron in the future if needed  --consider PT in the future     4.  History of stress incontinence:  --Empty bladder every 3 hours.  Empty well: wait a minute, lean forward on toilet.    --KEGELS: do 10 in AM and 10 in PM, holding each x 10 seconds.  When you feel urge to go, STOP, KEGEL, and when urge has passed, then go to bathroom. re.      RTC in 1 year for annual visit       Virgie TORIBIO [facility]  [facility]  Division of Female Pelvic Medicine and Reconstructive Surgery  Department of Obstetrics & Gynecology

## 2024-09-05 ENCOUNTER — OFFICE VISIT (OUTPATIENT)
Dept: UROGYNECOLOGY | Facility: CLINIC | Age: 44
End: 2024-09-05
Payer: COMMERCIAL

## 2024-09-05 VITALS
HEART RATE: 80 BPM | SYSTOLIC BLOOD PRESSURE: 111 MMHG | BODY MASS INDEX: 22.25 KG/M2 | HEIGHT: 67 IN | DIASTOLIC BLOOD PRESSURE: 73 MMHG | WEIGHT: 141.75 LBS

## 2024-09-05 DIAGNOSIS — Z87.448 HISTORY OF STRESS INCONTINENCE: ICD-10-CM

## 2024-09-05 DIAGNOSIS — N30.10 INTERSTITIAL CYSTITIS: ICD-10-CM

## 2024-09-05 DIAGNOSIS — K58.2 IRRITABLE BOWEL SYNDROME WITH BOTH CONSTIPATION AND DIARRHEA: ICD-10-CM

## 2024-09-05 DIAGNOSIS — N80.9 ENDOMETRIOSIS: Primary | ICD-10-CM

## 2024-09-05 PROCEDURE — 3078F DIAST BP <80 MM HG: CPT | Mod: CPTII,S$GLB,, | Performed by: NURSE PRACTITIONER

## 2024-09-05 PROCEDURE — 3008F BODY MASS INDEX DOCD: CPT | Mod: CPTII,S$GLB,, | Performed by: NURSE PRACTITIONER

## 2024-09-05 PROCEDURE — 99213 OFFICE O/P EST LOW 20 MIN: CPT | Mod: S$GLB,,, | Performed by: NURSE PRACTITIONER

## 2024-09-05 PROCEDURE — 1159F MED LIST DOCD IN RCRD: CPT | Mod: CPTII,S$GLB,, | Performed by: NURSE PRACTITIONER

## 2024-09-05 PROCEDURE — 99999 PR PBB SHADOW E&M-EST. PATIENT-LVL III: CPT | Mod: PBBFAC,,, | Performed by: NURSE PRACTITIONER

## 2024-09-05 PROCEDURE — 3074F SYST BP LT 130 MM HG: CPT | Mod: CPTII,S$GLB,, | Performed by: NURSE PRACTITIONER

## 2024-10-11 ENCOUNTER — OFFICE VISIT (OUTPATIENT)
Dept: OBSTETRICS AND GYNECOLOGY | Facility: CLINIC | Age: 44
End: 2024-10-11
Payer: COMMERCIAL

## 2024-10-11 VITALS
SYSTOLIC BLOOD PRESSURE: 110 MMHG | WEIGHT: 140.63 LBS | HEIGHT: 67 IN | BODY MASS INDEX: 22.07 KG/M2 | DIASTOLIC BLOOD PRESSURE: 72 MMHG

## 2024-10-11 DIAGNOSIS — R92.333 HETEROGENEOUSLY DENSE TISSUE OF BOTH BREASTS ON MAMMOGRAPHY: ICD-10-CM

## 2024-10-11 DIAGNOSIS — Z80.3 FAMILY HISTORY OF BREAST CANCER: ICD-10-CM

## 2024-10-11 DIAGNOSIS — Z01.419 ENCOUNTER FOR WELL WOMAN EXAM WITH ROUTINE GYNECOLOGICAL EXAM: Primary | ICD-10-CM

## 2024-10-11 DIAGNOSIS — Z91.89 AT HIGH RISK FOR BREAST CANCER: ICD-10-CM

## 2024-10-11 PROCEDURE — 99999 PR PBB SHADOW E&M-EST. PATIENT-LVL II: CPT | Mod: PBBFAC,,, | Performed by: OBSTETRICS & GYNECOLOGY

## 2024-10-11 RX ORDER — DAPSONE 75 MG/G
GEL TOPICAL
COMMUNITY
Start: 2024-08-28

## 2024-10-11 RX ORDER — DESONIDE 0.5 MG/G
CREAM TOPICAL 2 TIMES DAILY PRN
COMMUNITY
Start: 2024-10-09

## 2024-10-11 NOTE — PROGRESS NOTES
"History & Physical  Gynecology      SUBJECTIVE:     Chief Complaint: No chief complaint on file.       History of Present Illness:  Ghada Manley is a 44 y.o. female  (C/S x2) here for annual routine Pap and checkup. No LMP recorded. (Menstrual status: Birth Control)..   Pt with hx of endometriosis, IC, IBS.  Controlled by her lo loestrin.,  Having some issues with coverage for medication with [facility].  No issues today.    Not having any menses with the lo loestrin.    denies break through bleeding.   denies vaginal itching or irritation.  denies vaginal discharge.    She is sexually active with 1 partner  She uses oral contraceptives (estrogen/progesterone) for contraception.    History of abnormal pap: No  Last Pap: 2021, normal. HPV negative  Last MM2024, doing MMG and MRI q 6 months  Last Colonoscopy:  2024, repeat in 10 years    Mother, MGM, and maternal aunt (28)-- breast cancer.      Review of Systems:  Review of Systems   Constitutional: Negative.  Negative for chills, fatigue and fever.   HENT:  Negative for facial swelling.    Eyes:  Negative for visual disturbance.   Respiratory:  Negative for cough and shortness of breath.    Cardiovascular:  Negative for chest pain.   Gastrointestinal:  Negative for abdominal distention, abdominal pain, constipation, diarrhea, nausea and vomiting.   Genitourinary:  Negative for dysuria, flank pain, frequency, genital sores, hematuria, pelvic pain, urgency, vaginal bleeding, vaginal discharge and vaginal pain.   Musculoskeletal:  Negative for back pain.   Skin:  Negative for rash.   Neurological:  Negative for dizziness, weakness, light-headedness and headaches.   Psychiatric/Behavioral:  Negative for behavioral problems. The patient is not nervous/anxious.         OBJECTIVE:   Vitals:     Vitals:    10/11/24 0802   BP: 110/72   Weight: 63.8 kg (140 lb 10.5 oz)   Height: 5' 7" (1.702 m)        Physical Exam:  Physical " Exam  Constitutional:       Appearance: She is well-developed.   HENT:      Head: Normocephalic and atraumatic.   Eyes:      General: No scleral icterus.        Right eye: No discharge.         Left eye: No discharge.      Conjunctiva/sclera: Conjunctivae normal.   Pulmonary:      Effort: Pulmonary effort is normal.      Breath sounds: No stridor.   Chest:      Chest wall: No mass or tenderness.   Breasts:     Breasts are symmetrical.      Right: No inverted nipple, mass, nipple discharge, skin change or tenderness.      Left: No inverted nipple, mass, nipple discharge, skin change or tenderness.   Abdominal:      General: There is no distension.      Palpations: Abdomen is soft.      Tenderness: There is no abdominal tenderness.   Genitourinary:     Labia:         Right: No rash, tenderness, lesion or injury.         Left: No rash, tenderness, lesion or injury.       Vagina: Normal.      Cervix: No cervical motion tenderness, discharge or friability.      Adnexa:         Right: No mass, tenderness or fullness.          Left: No mass, tenderness or fullness.     Musculoskeletal:         General: Normal range of motion.   Skin:     General: Skin is warm and dry.   Neurological:      Mental Status: She is alert and oriented to person, place, and time.   Psychiatric:         Behavior: Behavior normal.         Thought Content: Thought content normal.         Judgment: Judgment normal.           ASSESSMENT:       ICD-10-CM ICD-9-CM    1. Encounter for well woman exam with routine gynecological exam  Z01.419 V72.31       2. Family history of breast cancer  Z80.3 V16.3 Ambulatory referral/consult to Gynecology      3. At high risk for breast cancer  Z91.89 V49.89 Ambulatory referral/consult to Gynecology      4. Heterogeneously dense tissue of both breasts on mammography  R92.333 793.82 Ambulatory referral/consult to Gynecology                 Plan:      Diagnoses and all orders for this visit:    Encounter for well woman  exam with routine gynecological exam  - pap smear up to date  - MMG and MRI q 6 months for high risk breast cancer  - Cscope up to date    Family history of breast cancer  -     Ambulatory referral/consult to Gynecology    At high risk for breast cancer  -     Ambulatory referral/consult to Gynecology      No orders of the defined types were placed in this encounter.      No follow-ups on file.      Face to Face time with patient: 15 min    20 minutes of total time spent on the encounter, which includes face to face time and non-face to face time preparing to see the patient (eg, review of tests), Obtaining and/or reviewing separately obtained history, Documenting clinical information in the electronic or other health record, Independently interpreting results (not separately reported) and communicating results to the patient/family/caregiver, or Care coordination (not separately reported).      Lucy Bray MD  Obstetrics & Gynecology

## 2024-10-25 ENCOUNTER — PATIENT MESSAGE (OUTPATIENT)
Dept: OBSTETRICS AND GYNECOLOGY | Facility: CLINIC | Age: 44
End: 2024-10-25
Payer: COMMERCIAL

## 2024-10-25 DIAGNOSIS — N80.9 ENDOMETRIOSIS: ICD-10-CM

## 2024-10-25 DIAGNOSIS — Z91.89 AT HIGH RISK FOR BREAST CANCER: ICD-10-CM

## 2024-10-27 RX ORDER — NORETHINDRONE ACETATE AND ETHINYL ESTRADIOL, ETHINYL ESTRADIOL AND FERROUS FUMARATE 1MG-10(24)
1 KIT ORAL DAILY
Qty: 84 TABLET | Refills: 4 | Status: SHIPPED | OUTPATIENT
Start: 2024-10-27 | End: 2025-10-27

## 2024-12-23 ENCOUNTER — HOSPITAL ENCOUNTER (OUTPATIENT)
Dept: RADIOLOGY | Facility: HOSPITAL | Age: 44
Discharge: HOME | End: 2024-12-23
Attending: NURSE PRACTITIONER
Payer: COMMERCIAL

## 2024-12-23 DIAGNOSIS — R92.333 HETEROGENEOUSLY DENSE TISSUE OF BOTH BREASTS ON MAMMOGRAPHY: ICD-10-CM

## 2024-12-23 DIAGNOSIS — Z80.3 FAMILY HISTORY OF BREAST CANCER: ICD-10-CM

## 2024-12-23 DIAGNOSIS — Z91.89 AT HIGH RISK FOR BREAST CANCER: ICD-10-CM

## 2024-12-23 PROCEDURE — 77067 SCR MAMMO BI INCL CAD: CPT | Mod: 26,,, | Performed by: RADIOLOGY

## 2024-12-23 PROCEDURE — 77063 BREAST TOMOSYNTHESIS BI: CPT | Mod: TC

## 2024-12-23 PROCEDURE — 77063 BREAST TOMOSYNTHESIS BI: CPT | Mod: 26,,, | Performed by: RADIOLOGY

## 2024-12-27 ENCOUNTER — TELEPHONE (OUTPATIENT)
Dept: RADIOLOGY | Facility: HOSPITAL | Age: 44
End: 2024-12-27
Payer: COMMERCIAL

## 2024-12-27 NOTE — TELEPHONE ENCOUNTER
Spoke with patient and explained mammogram findings.Patient expressed understanding of results. Patient scheduled abnormal mammogram follow up appointment at [facility] [place] on 1/8/2025.

## 2025-01-08 ENCOUNTER — HOSPITAL ENCOUNTER (OUTPATIENT)
Dept: RADIOLOGY | Facility: HOSPITAL | Age: 45
Discharge: HOME | End: 2025-01-08
Attending: NURSE PRACTITIONER
Payer: COMMERCIAL

## 2025-01-08 DIAGNOSIS — R92.8 ABNORMAL FINDING ON BREAST IMAGING: ICD-10-CM

## 2025-01-08 PROCEDURE — 77061 BREAST TOMOSYNTHESIS UNI: CPT | Mod: TC,RT

## 2025-01-08 PROCEDURE — 77065 DX MAMMO INCL CAD UNI: CPT | Mod: 26,RT,, | Performed by: RADIOLOGY

## 2025-01-08 PROCEDURE — 77061 BREAST TOMOSYNTHESIS UNI: CPT | Mod: 26,RT,, | Performed by: RADIOLOGY

## 2025-01-14 NOTE — PROGRESS NOTES
Reason For Follow Up: Increased lifetime risk of breast cancer      HPI:   Ghada Manley presents for a follow up of increased risk of breast cancer. Her most recent screening mammogram on 11/17/22 and her most recent MRI was on 4/27/22.      Today, Feels good and no complaints.   No breast concerns. Denies changes to breast health. Denies skin changes and nipple discharge.  Maternal aunt was diagnosed again with breast cancer, it is still stage I only requiring a lumpectomy.   Denies smoking, alcohol use 4-6 glasses week, exercise 40 minutes 5 days weeks.  Follows up with GYN for a 2nd breast exam year.     High Risk Breast cancer specific history:  - See original consultation note in high risk breast clinic from 10/13/21.       SEE CALCULATED RISK BELOW.     Past Medical   Past Medical History:   Diagnosis Date    Acute right-sided low back pain with right-sided sciatica 7/9/2021    Dysmenorrhea     Ectopic pregnancy     Endometriosis     IBS (irritable bowel syndrome)     Interstitial cystitis     Menarche     Age of onset 11    Ocular migraine     Ocular migraine 09/20/2021    Urine, incontinence, stress female      Patient Active Problem List   Diagnosis    Family history of breast cancer    Interstitial cystitis    Irritable bowel syndrome    Endometriosis    Tinnitus of left ear    Suspected COVID-19 virus infection    Ocular migraine    Screening mammogram, encounter for    At high risk for breast cancer    Heterogeneously dense tissue of both breasts on mammography    Vitamin B12 deficiency    Need for hepatitis C screening test     Social History   Social History     Tobacco Use    Smoking status: Never    Smokeless tobacco: Never   Substance Use Topics    Alcohol use: Yes     Alcohol/week: 4.0 - 6.0 standard drinks of alcohol     Types: 4 - 6 Glasses of wine per week     Comment: 4-6/wk - max    Drug use: Never     Family History  Family History   Problem Relation Name Age of Onset    Breast cancer  Mother Bella Klein 61    Asthma Mother Bella Klein     Cancer Mother Bella Klein     Stroke Mother Bella Klein     Diabetes Father Guevara Klein     Hypertension Father Guevara Klein     Breast cancer Maternal Aunt Janine Mcmahon 28    Diabetes Maternal Grandmother Ghada Chakraborty     Breast cancer Maternal Grandmother Ghada Chakraborty 70    Diabetes Maternal Grandfather KARRIE Klein     Hypertension Maternal Grandfather KARRIE Klein     Colon cancer Neg Hx      Ovarian cancer Neg Hx      Vaginal cancer Neg Hx      Endometrial cancer Neg Hx      Cervical cancer Neg Hx       Medications    Current Outpatient Medications:     amitriptyline (ELAVIL) 10 MG tablet, Take 1-2 tablets nightly, Disp: 180 tablet, Rfl: 3    dapsone (ACZONE) 7.5 % GlwP, Apply topically., Disp: , Rfl:     desonide (DESOWEN) 0.05 % cream, Apply topically 2 (two) times daily as needed., Disp: , Rfl:     Lactobacillus acidophilus (PROBIOTIC ORAL), Take by mouth., Disp: , Rfl:     LO LOESTRIN FE 1 mg-10 mcg (24)/10 mcg (2) Tab, Take 1 tablet by mouth Daily., Disp: 84 tablet, Rfl: 4    multivitamin (THERAGRAN) per tablet, Take 1 tablet by mouth once daily., Disp: , Rfl:     psyllium seed, with sugar, (FIBER ORAL), Take by mouth., Disp: , Rfl:     tolterodine (DETROL LA) 4 MG 24 hr capsule, TAKE 1 CAPSULE DAILY, Disp: 90 capsule, Rfl: 3  Allergies  Review of patient's allergies indicates:   Allergen Reactions    Ciprofloxacin (bulk) Anaphylaxis    Codeine Nausea Only       Review of Systems       See above   Review of Systems  Review of Systems   Constitutional:  Negative for appetite change and unexpected weight change.   HENT:  Negative for mouth sores.    Eyes:  Negative for visual disturbance.   Respiratory:  Negative for cough and shortness of breath.    Cardiovascular:  Negative for chest pain.   Gastrointestinal:  Negative for abdominal pain and diarrhea.   Genitourinary:  Negative for frequency.   Musculoskeletal:  Negative for back  "pain.   Skin:  Negative for rash.   Neurological:  Negative for headaches.   Hematological:  Negative for adenopathy.   Psychiatric/Behavioral:  The patient is not nervous/anxious.      All other systems reviewed and are negative.    Objective:      Vitals:   Vitals:    01/28/25 1306 01/28/25 1312   BP: 139/63 132/61   BP Location: Left arm Right arm   Patient Position: Sitting Sitting   Pulse: 88    Resp: 18    Temp: 97.8 °F (36.6 °C)    TempSrc: Oral    SpO2: 100%    Weight: 65 kg (143 lb 4.8 oz)    Height: 5' 7" (1.702 m)      BMI: Body mass index is 22.44 kg/m².   Body surface area is 1.75 meters squared.    Physical Exam  Vitals and nursing note reviewed.   Constitutional:       General: She is not in acute distress.     Appearance: Normal appearance. She is well-developed.   HENT:      Head: Normocephalic.   Cardiovascular:      Rate and Rhythm: Normal rate and regular rhythm.      Heart sounds: Normal heart sounds.   Pulmonary:      Effort: Pulmonary effort is normal.      Breath sounds: Normal breath sounds.   Chest:   Breasts:     Right: Normal.      Left: Normal.   Musculoskeletal:      Cervical back: Normal range of motion.   Lymphadenopathy:      Cervical: No cervical adenopathy.      Upper Body:      Right upper body: No supraclavicular or axillary adenopathy.      Left upper body: No supraclavicular or axillary adenopathy.   Skin:     General: Skin is warm and dry.      Findings: No rash.   Neurological:      Mental Status: She is alert and oriented to person, place, and time.   Psychiatric:         Behavior: Behavior normal.       Laboratory Data: reviewed most recent   Imaging: reviewed most recent    Assessment:     1. At high risk for breast cancer    2. Heterogeneously dense tissue of both breasts on mammography        1. Increased risk of breast cancer   * Tyrer-Cuzick (TC) lifetime risk of 27.5%. We discussed that TC score will categorize your lifetime risk of being diagnosed with breast cancer. " Categories are as such: Average risk <15%, Intermediate risk 15-19%, and High risk > or = to 20%.    * The Lolis Model for Breast Cancer risk: She has a 1.3% 5-year breast cancer risk (Compared with 0.7% for the average 45 y.o. woman) and 18.5% Lifetime breast cancer risk (Compared with 12.2% for the average 45 y.o. woman). A woman's risk is considered low if her five-year risk of developing breast cancer is less than 1.6%; it is considered high if she scores above 1.66%. (All women who are over 60 have a score of at least 1.66 and are considered high risk, based on the Lolis Model.)      Risk factors are categorized into 2 groups: Modifiable and Non-modifiable. Modifiable risk factors include use of hormones, alcohol, smoking, diet and exercise. Non-modifiable risk factors include breast density, genetics, chest radiation, previous pregnancies, age of first period, and age of menopause.    * For women at high risk for breast cancer, endocrine therapy can reduce the risk of invasive and/or in situ breast cancers. (tamoxifen for premenopausal or postmenopausal women and raloxifene or exemestane for postmenopausal women).   * Discussed that Tamoxifen 20 mg daily for 5 years has shown to reduce risk of breast cancer by 49%. At current, there is not adequate data to recommend longer courses of therapy more than 5 years for risk reduction.      * Reviewed risks of Tamoxifen side effects include hot flashes, invasive endometrial cancer in women > 49 years of age (2.3/1000 compared to 0.9/1000), cataracts, increased risk of pulmonary embolism among others.    * Reviewed Lifestyle modifications which have shown benefit:  Limit alcohol consumption to less than 1 drink per day (1 ounce liquor, 6 oz wine, 8 oz beer)  Avoid smoking.  Exercise at least 150 minutes per week of moderate intensity aerobic activity or at least 75 minutes of vigorous activity. Exercise can lower the relative risk of breast cancer by ~18-20%.  Maintain  healthy weight and avoid post-menopausal weight gain. Avoid processed foods and eat more lean proteins, fruits and vegetables.       Plan:     Patient has opted out chemoprevention but will call in the future if she wishes to pursue.   Patient elects to proceed with alternating annual mammogram and annual breast MRI along with semiannual CBEs.  Patient will follow up with PCP or GYN for one semiannual CBE along with annual mammogram in December 2025 and will RTC here for one semiannual CBE along with annual breast MRI in June 2025.  Lifestyle modifications as detailed above.   5.   Encouraged breast awareness, including monthly breast self-exams.     RTC in 1 year to see me either at [facility] or on 3rd floor.    Questions were encouraged and answered to patient's satisfaction, and patient verbalized understanding of information and agreement with the plan. Advised patient to RTC with any interval changes or concerns.      Patient is in agreement with the proposed treatment plan. All questions were answered to the patient's satisfaction. Patient knows to call clinic for any new or worsening symptoms and if anything is needed before the next clinic visit.          Vira Bunch, NELYP-C  Hematology & Medical Oncology   [address] [place] [id number]  ph. [phone]  Fax. [phone]    Collaborating physician, Dr. Flynn    Total time spent with the patient: 20 minutes, 10 minutes of face to face consultation and 10 minutes of chart review and coordination of care, on the day of the visit. This includes face to face time and non-face to face time preparing to see the patient (eg, review of tests), obtaining and/or reviewing separately obtained history, documenting clinical information in the electronic or other health record, independently interpreting resultsand communicating results to the patient/family/caregiver, or care coordination.    Route Chart for Scheduling    Med Onc Chart  Routing      Follow up with physician    Follow up with [facility] 1 year.   Infusion scheduling note    Injection scheduling note    Labs    Imaging MRI and mammogram   MRI in July, Mammogram Pa[facility]   Pharmacy appointment    Other referrals                         '

## 2025-01-28 ENCOUNTER — OFFICE VISIT (OUTPATIENT)
Dept: HEMATOLOGY/ONCOLOGY | Facility: CLINIC | Age: 45
End: 2025-01-28
Payer: COMMERCIAL

## 2025-01-28 VITALS
HEART RATE: 88 BPM | SYSTOLIC BLOOD PRESSURE: 132 MMHG | OXYGEN SATURATION: 100 % | BODY MASS INDEX: 22.49 KG/M2 | DIASTOLIC BLOOD PRESSURE: 61 MMHG | RESPIRATION RATE: 18 BRPM | HEIGHT: 67 IN | WEIGHT: 143.31 LBS | TEMPERATURE: 98 F

## 2025-01-28 DIAGNOSIS — Z91.89 AT HIGH RISK FOR BREAST CANCER: Primary | ICD-10-CM

## 2025-01-28 DIAGNOSIS — Z80.3 FAMILY HISTORY OF BREAST CANCER IN FEMALE: ICD-10-CM

## 2025-01-28 DIAGNOSIS — R92.333 HETEROGENEOUSLY DENSE TISSUE OF BOTH BREASTS ON MAMMOGRAPHY: ICD-10-CM

## 2025-01-28 PROCEDURE — 99214 OFFICE O/P EST MOD 30 MIN: CPT | Mod: S$GLB,,, | Performed by: NURSE PRACTITIONER

## 2025-01-28 PROCEDURE — G2211 COMPLEX E/M VISIT ADD ON: HCPCS | Mod: S$GLB,,, | Performed by: NURSE PRACTITIONER

## 2025-01-28 PROCEDURE — 1159F MED LIST DOCD IN RCRD: CPT | Mod: CPTII,S$GLB,, | Performed by: NURSE PRACTITIONER

## 2025-01-28 PROCEDURE — 3078F DIAST BP <80 MM HG: CPT | Mod: CPTII,S$GLB,, | Performed by: NURSE PRACTITIONER

## 2025-01-28 PROCEDURE — 3008F BODY MASS INDEX DOCD: CPT | Mod: CPTII,S$GLB,, | Performed by: NURSE PRACTITIONER

## 2025-01-28 PROCEDURE — 99999 PR PBB SHADOW E&M-EST. PATIENT-LVL V: CPT | Mod: PBBFAC,,, | Performed by: NURSE PRACTITIONER

## 2025-01-28 PROCEDURE — 1160F RVW MEDS BY RX/DR IN RCRD: CPT | Mod: CPTII,S$GLB,, | Performed by: NURSE PRACTITIONER

## 2025-01-28 PROCEDURE — 3075F SYST BP GE 130 - 139MM HG: CPT | Mod: CPTII,S$GLB,, | Performed by: NURSE PRACTITIONER

## 2025-03-18 ENCOUNTER — PATIENT MESSAGE (OUTPATIENT)
Dept: HEMATOLOGY/ONCOLOGY | Facility: CLINIC | Age: 45
End: 2025-03-18
Payer: COMMERCIAL

## 2025-03-18 DIAGNOSIS — N30.10 INTERSTITIAL CYSTITIS: ICD-10-CM

## 2025-03-20 RX ORDER — AMITRIPTYLINE HYDROCHLORIDE 10 MG/1
TABLET, FILM COATED ORAL
Qty: 180 TABLET | Refills: 3 | Status: SHIPPED | OUTPATIENT
Start: 2025-03-20 | End: 2026-03-19
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
## 2025-07-03 ENCOUNTER — LAB VISIT (OUTPATIENT)
Dept: LAB | Facility: HOSPITAL | Age: 45
End: 2025-07-03
Attending: INTERNAL MEDICINE
Payer: COMMERCIAL

## 2025-07-03 ENCOUNTER — OFFICE VISIT (OUTPATIENT)
Dept: PRIMARY CARE CLINIC | Facility: CLINIC | Age: 45
End: 2025-07-03
Payer: COMMERCIAL

## 2025-07-03 VITALS
WEIGHT: 143.31 LBS | HEIGHT: 67 IN | SYSTOLIC BLOOD PRESSURE: 114 MMHG | DIASTOLIC BLOOD PRESSURE: 82 MMHG | OXYGEN SATURATION: 99 % | BODY MASS INDEX: 22.49 KG/M2 | HEART RATE: 86 BPM

## 2025-07-03 DIAGNOSIS — Z13.220 SCREENING FOR LIPOID DISORDERS: ICD-10-CM

## 2025-07-03 DIAGNOSIS — K58.2 IRRITABLE BOWEL SYNDROME WITH BOTH CONSTIPATION AND DIARRHEA: ICD-10-CM

## 2025-07-03 DIAGNOSIS — Z00.00 NORMAL PHYSICAL EXAM, ROUTINE: Primary | ICD-10-CM

## 2025-07-03 DIAGNOSIS — N30.10 INTERSTITIAL CYSTITIS: ICD-10-CM

## 2025-07-03 DIAGNOSIS — Z00.00 NORMAL PHYSICAL EXAM, ROUTINE: ICD-10-CM

## 2025-07-03 LAB
ABSOLUTE EOSINOPHIL (OHS): 0.18 K/UL
ABSOLUTE MONOCYTE (OHS): 0.35 K/UL (ref 0.3–1)
ABSOLUTE NEUTROPHIL COUNT (OHS): 3.5 K/UL (ref 1.8–7.7)
ALBUMIN SERPL BCP-MCNC: 4.1 G/DL (ref 3.5–5.2)
ALP SERPL-CCNC: 45 UNIT/L (ref 40–150)
ALT SERPL W/O P-5'-P-CCNC: 19 UNIT/L (ref 10–44)
ANION GAP (OHS): 9 MMOL/L (ref 8–16)
AST SERPL-CCNC: 22 UNIT/L (ref 11–45)
BASOPHILS # BLD AUTO: 0.07 K/UL
BASOPHILS NFR BLD AUTO: 1.3 %
BILIRUB SERPL-MCNC: 0.7 MG/DL (ref 0.1–1)
BUN SERPL-MCNC: 9 MG/DL (ref 6–20)
CALCIUM SERPL-MCNC: 9 MG/DL (ref 8.7–10.5)
CHLORIDE SERPL-SCNC: 105 MMOL/L (ref 95–110)
CHOLEST SERPL-MCNC: 158 MG/DL (ref 120–199)
CHOLEST/HDLC SERPL: 2.4 {RATIO} (ref 2–5)
CO2 SERPL-SCNC: 23 MMOL/L (ref 23–29)
CREAT SERPL-MCNC: 0.9 MG/DL (ref 0.5–1.4)
ERYTHROCYTE [DISTWIDTH] IN BLOOD BY AUTOMATED COUNT: 11.9 % (ref 11.5–14.5)
GFR SERPLBLD CREATININE-BSD FMLA CKD-EPI: >60 ML/MIN/1.73/M2
GLUCOSE SERPL-MCNC: 75 MG/DL (ref 70–110)
HCT VFR BLD AUTO: 40.7 % (ref 37–48.5)
HDLC SERPL-MCNC: 67 MG/DL (ref 40–75)
HDLC SERPL: 42.4 % (ref 20–50)
HGB BLD-MCNC: 13.2 GM/DL (ref 12–16)
IMM GRANULOCYTES # BLD AUTO: 0.01 K/UL (ref 0–0.04)
IMM GRANULOCYTES NFR BLD AUTO: 0.2 % (ref 0–0.5)
LDLC SERPL CALC-MCNC: 77.2 MG/DL (ref 63–159)
LYMPHOCYTES # BLD AUTO: 1.1 K/UL (ref 1–4.8)
MCH RBC QN AUTO: 32.7 PG (ref 27–31)
MCHC RBC AUTO-ENTMCNC: 32.4 G/DL (ref 32–36)
MCV RBC AUTO: 101 FL (ref 82–98)
NONHDLC SERPL-MCNC: 91 MG/DL
NUCLEATED RBC (/100WBC) (OHS): 0 /100 WBC
PLATELET # BLD AUTO: 283 K/UL (ref 150–450)
PMV BLD AUTO: 12.2 FL (ref 9.2–12.9)
POTASSIUM SERPL-SCNC: 4.4 MMOL/L (ref 3.5–5.1)
PROT SERPL-MCNC: 7.1 GM/DL (ref 6–8.4)
RBC # BLD AUTO: 4.04 M/UL (ref 4–5.4)
RELATIVE EOSINOPHIL (OHS): 3.5 %
RELATIVE LYMPHOCYTE (OHS): 21.1 % (ref 18–48)
RELATIVE MONOCYTE (OHS): 6.7 % (ref 4–15)
RELATIVE NEUTROPHIL (OHS): 67.2 % (ref 38–73)
SODIUM SERPL-SCNC: 137 MMOL/L (ref 136–145)
TRIGL SERPL-MCNC: 69 MG/DL (ref 30–150)
TSH SERPL-ACNC: 2.11 UIU/ML (ref 0.4–4)
WBC # BLD AUTO: 5.21 K/UL (ref 3.9–12.7)

## 2025-07-03 PROCEDURE — 36415 COLL VENOUS BLD VENIPUNCTURE: CPT | Mod: PN

## 2025-07-03 PROCEDURE — 3008F BODY MASS INDEX DOCD: CPT | Mod: CPTII,S$GLB,, | Performed by: INTERNAL MEDICINE

## 2025-07-03 PROCEDURE — 80053 COMPREHEN METABOLIC PANEL: CPT

## 2025-07-03 PROCEDURE — 84443 ASSAY THYROID STIM HORMONE: CPT

## 2025-07-03 PROCEDURE — 1160F RVW MEDS BY RX/DR IN RCRD: CPT | Mod: CPTII,S$GLB,, | Performed by: INTERNAL MEDICINE

## 2025-07-03 PROCEDURE — 1159F MED LIST DOCD IN RCRD: CPT | Mod: CPTII,S$GLB,, | Performed by: INTERNAL MEDICINE

## 2025-07-03 PROCEDURE — 3074F SYST BP LT 130 MM HG: CPT | Mod: CPTII,S$GLB,, | Performed by: INTERNAL MEDICINE

## 2025-07-03 PROCEDURE — 99999 PR PBB SHADOW E&M-EST. PATIENT-LVL IV: CPT | Mod: PBBFAC,,, | Performed by: INTERNAL MEDICINE

## 2025-07-03 PROCEDURE — 85025 COMPLETE CBC W/AUTO DIFF WBC: CPT

## 2025-07-03 PROCEDURE — 3079F DIAST BP 80-89 MM HG: CPT | Mod: CPTII,S$GLB,, | Performed by: INTERNAL MEDICINE

## 2025-07-03 PROCEDURE — 99396 PREV VISIT EST AGE 40-64: CPT | Mod: S$GLB,,, | Performed by: INTERNAL MEDICINE

## 2025-07-03 PROCEDURE — 80061 LIPID PANEL: CPT

## 2025-07-06 ENCOUNTER — RESULTS FOLLOW-UP (OUTPATIENT)
Dept: PRIMARY CARE CLINIC | Facility: CLINIC | Age: 45
End: 2025-07-06

## 2025-07-15 ENCOUNTER — PATIENT MESSAGE (OUTPATIENT)
Dept: HEMATOLOGY/ONCOLOGY | Facility: CLINIC | Age: 45
End: 2025-07-15
Payer: COMMERCIAL

## 2025-07-15 DIAGNOSIS — Z80.3 FAMILY HISTORY OF BREAST CANCER: ICD-10-CM

## 2025-07-15 DIAGNOSIS — R92.333 HETEROGENEOUSLY DENSE TISSUE OF BOTH BREASTS ON MAMMOGRAPHY: ICD-10-CM

## 2025-07-15 DIAGNOSIS — Z91.89 AT HIGH RISK FOR BREAST CANCER: Primary | ICD-10-CM

## 2025-08-18 DIAGNOSIS — N30.10 INTERSTITIAL CYSTITIS: ICD-10-CM

## 2025-08-19 RX ORDER — TOLTERODINE 4 MG/1
4 CAPSULE, EXTENDED RELEASE ORAL
Qty: 90 CAPSULE | Refills: 0 | Status: SHIPPED | OUTPATIENT
Start: 2025-08-19